# Patient Record
Sex: FEMALE | Race: WHITE | NOT HISPANIC OR LATINO | Employment: OTHER | URBAN - METROPOLITAN AREA
[De-identification: names, ages, dates, MRNs, and addresses within clinical notes are randomized per-mention and may not be internally consistent; named-entity substitution may affect disease eponyms.]

---

## 2017-01-03 ENCOUNTER — HOSPITAL ENCOUNTER (OUTPATIENT)
Dept: CT IMAGING | Facility: HOSPITAL | Age: 72
Discharge: HOME | End: 2017-01-03
Attending: INTERNAL MEDICINE | Admitting: INTERNAL MEDICINE

## 2017-01-03 DIAGNOSIS — K86.89 PANCREATIC MASS: ICD-10-CM

## 2017-01-03 DIAGNOSIS — R11.0 NAUSEA: ICD-10-CM

## 2017-01-03 DIAGNOSIS — R10.9 ABDOMINAL CRAMPS: ICD-10-CM

## 2017-01-03 LAB — CREAT BLDA-MCNC: 0.9 MG/DL (ref 0.6–1.3)

## 2017-01-03 PROCEDURE — 74160 CT ABDOMEN W/CONTRAST: CPT

## 2017-01-03 PROCEDURE — 0 IOPAMIDOL 61 % SOLUTION: Performed by: INTERNAL MEDICINE

## 2017-01-03 PROCEDURE — 82565 ASSAY OF CREATININE: CPT

## 2017-01-03 RX ADMIN — IOPAMIDOL 100 ML: 612 INJECTION, SOLUTION INTRAVENOUS at 09:00

## 2017-01-04 ENCOUNTER — HOSPITAL ENCOUNTER (OUTPATIENT)
Dept: NUCLEAR MEDICINE | Facility: HOSPITAL | Age: 72
Discharge: HOME | End: 2017-01-04
Attending: INTERNAL MEDICINE

## 2017-01-04 DIAGNOSIS — R11.0 NAUSEA: ICD-10-CM

## 2017-01-04 DIAGNOSIS — R10.9 ABDOMINAL CRAMPS: ICD-10-CM

## 2017-01-04 PROCEDURE — 78227 HEPATOBIL SYST IMAGE W/DRUG: CPT

## 2017-01-04 PROCEDURE — 0 TECHNETIUM TC 99M MEBROFENIN KIT: Performed by: INTERNAL MEDICINE

## 2017-01-04 PROCEDURE — A9537 TC99M MEBROFENIN: HCPCS | Performed by: INTERNAL MEDICINE

## 2017-01-04 PROCEDURE — 25010000002 SINCALIDE PER 5 MCG: Performed by: INTERNAL MEDICINE

## 2017-01-04 RX ORDER — KIT FOR THE PREPARATION OF TECHNETIUM TC 99M MEBROFENIN 45 MG/10ML
1 INJECTION, POWDER, LYOPHILIZED, FOR SOLUTION INTRAVENOUS
Status: COMPLETED | OUTPATIENT
Start: 2017-01-04 | End: 2017-01-04

## 2017-01-04 RX ADMIN — SINCALIDE 1.1 MCG: 5 INJECTION, POWDER, LYOPHILIZED, FOR SOLUTION INTRAVENOUS at 08:45

## 2017-01-04 RX ADMIN — MEBROFENIN 1 DOSE: 45 INJECTION, POWDER, LYOPHILIZED, FOR SOLUTION INTRAVENOUS at 07:40

## 2017-01-09 ENCOUNTER — HOSPITAL ENCOUNTER (OUTPATIENT)
Dept: GENERAL RADIOLOGY | Facility: HOSPITAL | Age: 72
Discharge: HOME | End: 2017-01-09
Attending: INTERNAL MEDICINE | Admitting: INTERNAL MEDICINE

## 2017-01-09 DIAGNOSIS — R10.9 ABDOMINAL CRAMPS: ICD-10-CM

## 2017-01-09 DIAGNOSIS — R11.0 NAUSEA: ICD-10-CM

## 2017-01-09 PROCEDURE — 74250 X-RAY XM SM INT 1CNTRST STD: CPT

## 2017-01-11 DIAGNOSIS — K86.89 PANCREATIC MASS: Primary | ICD-10-CM

## 2017-01-12 ENCOUNTER — TELEPHONE (OUTPATIENT)
Dept: GASTROENTEROLOGY | Facility: CLINIC | Age: 72
End: 2017-01-12

## 2017-01-12 NOTE — TELEPHONE ENCOUNTER
Called pt is asking whether she should keep her appt on Feb 1st.  Pt states she is still having issue with nausea.  Advised pt to keep her appt.  Pt verb understanding and is asking if she can have a refill on her compazine to hold her to her appt on Feb 1st.  Advised would send message to Dr Brumfield.  Pt verb understanding.

## 2017-01-12 NOTE — TELEPHONE ENCOUNTER
----- Message from Geneva Moreno sent at 1/12/2017  8:25 AM EST -----  Regarding: PT CALLED  Contact: [phone]   PT CALLED STATED SPOKE WITH  & WANTED TO GET A BETTER CLARIFICATION OF WHATS GOING ON

## 2017-01-13 ENCOUNTER — TELEPHONE (OUTPATIENT)
Dept: GASTROENTEROLOGY | Facility: CLINIC | Age: 72
End: 2017-01-13

## 2017-01-13 RX ORDER — PROCHLORPERAZINE MALEATE 5 MG/1
5 TABLET ORAL EVERY 8 HOURS PRN
Qty: 60 TABLET | Refills: 1 | Status: SHIPPED | OUTPATIENT
Start: 2017-01-13 | End: 2017-04-27 | Stop reason: SDUPTHER

## 2017-01-13 NOTE — TELEPHONE ENCOUNTER
Called pt and advised per Dr Brumfield that the sbft and the hida scan were both normal.  The ct scan of abd showed a stable , unchanged 4 mm cyst in the pancreas.  He plans to follow this with periodic ct of the abd in 6 months.  The radiologist will compare 1 ct to the other.  He wants her  To f/u one wk after ct done approx July 2017.  If she has questions , she can f/u or call.  Pt verb understanding and states she already has an appt on 02/01 and will keep it.

## 2017-01-13 NOTE — TELEPHONE ENCOUNTER
Yes, please call in a prescription for Prochlorperazine (Compazine) 10 mg; one po q 8 hrs prn nausea. #60 with 1 refill. [facility]     Called pt and advised per Dr Brumfield that we have escribed her compazine as described above.  Pt verb understanding.

## 2017-01-17 ENCOUNTER — TELEPHONE (OUTPATIENT)
Dept: GASTROENTEROLOGY | Facility: CLINIC | Age: 72
End: 2017-01-17

## 2017-01-17 DIAGNOSIS — K58.1 IRRITABLE BOWEL SYNDROME WITH CONSTIPATION: Primary | ICD-10-CM

## 2017-01-17 NOTE — TELEPHONE ENCOUNTER
----- Message from Geneva Moreno sent at 1/17/2017 12:14 PM EST -----  Regarding: PT CALLED   Contact: [phone]   PT CALLED COMPLAINING ABOUT STOMACH PAIN. WOKE UP LAST NIGHT WITH A LOT OF CRAMPING & DIDN'T STOP TILL THIS MORNING, PT ASKING FOR SOME MEDICATION.

## 2017-01-17 NOTE — TELEPHONE ENCOUNTER
Call to pt.  She reports that wakened @ 0340 this am with lower, midline abd cramping.  Ranks at 7 on pain scale.  (States often has abd cramping, but usually only about a 3. )  States lasted until 0700.  States had normal, large amount BM this am.  Reports had nausea which was controlled by Compazine. Pt asking if anything she can take as needed for cramps. Advise pt will send this request to Dr Brumfield.  Pt verb understanding.

## 2017-01-18 RX ORDER — GLYCOPYRROLATE 2 MG/1
TABLET ORAL
Qty: 60 TABLET | Refills: 11 | Status: SHIPPED | OUTPATIENT
Start: 2017-01-18 | End: 2017-07-13

## 2017-02-01 ENCOUNTER — TELEPHONE (OUTPATIENT)
Dept: GASTROENTEROLOGY | Facility: CLINIC | Age: 72
End: 2017-02-01

## 2017-02-01 NOTE — TELEPHONE ENCOUNTER
----- Message from Geneva Moreno sent at 2/1/2017  8:11 AM EST -----  Regarding: PT CALLED  Contact: [phone]   PT CALLED FOR APPT. PT STATED IS SICK BUT HAS APPT TODAY BUT WANTS TO TALK TO A NURSE

## 2017-02-01 NOTE — TELEPHONE ENCOUNTER
"Call from pt.  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  States \"from a GI standpoint I'm doing ok - just don't want to come into the office today and make other people sick.\"  Appt rescheduled for 3/1/17 @ 3874 with Dr Brumfield.  "

## 2017-03-01 ENCOUNTER — OFFICE VISIT (OUTPATIENT)
Dept: GASTROENTEROLOGY | Facility: CLINIC | Age: 72
End: 2017-03-01

## 2017-03-01 VITALS
WEIGHT: 120 LBS | HEIGHT: 65 IN | SYSTOLIC BLOOD PRESSURE: 112 MMHG | BODY MASS INDEX: 19.99 KG/M2 | DIASTOLIC BLOOD PRESSURE: 74 MMHG

## 2017-03-01 DIAGNOSIS — K86.89 PANCREATIC MASS: Primary | ICD-10-CM

## 2017-03-01 DIAGNOSIS — K63.5 COLON POLYPS: ICD-10-CM

## 2017-03-01 DIAGNOSIS — R19.7 DIARRHEA, UNSPECIFIED TYPE: ICD-10-CM

## 2017-03-01 DIAGNOSIS — K21.9 GASTROESOPHAGEAL REFLUX DISEASE, ESOPHAGITIS PRESENCE NOT SPECIFIED: ICD-10-CM

## 2017-03-01 PROCEDURE — 99213 OFFICE O/P EST LOW 20 MIN: CPT | Performed by: INTERNAL MEDICINE

## 2017-03-01 NOTE — PROGRESS NOTES
Chief Complaint   Patient presents with   • Follow-up     from scopes        History of Present Illness: We reviewed the results of the SBFT, Kinevac Hida scan and CT of the abd done in 1/17.  She has frequent BM's but no abdominal pain. She has tried Robinul Forte prn and it helps.     Past Medical History   Diagnosis Date   • Bipolar affect, depressed    • Cervical myelopathy    • Depression    • Gastritis    • GERD (gastroesophageal reflux disease)    • Migraine headache        Past Surgical History   Procedure Laterality Date   • Tonsillectomy     • Colonoscopy  05/2013   • Endoscopy  11/2013   • Endoscopy N/A 10/31/2016     erythematous mucosa in stomach   • Colonoscopy N/A 10/31/2016     polyps, tics, IHtubular adenoma w/low grade dysplasia x 2, hyperplastic polyp x3         Current Outpatient Prescriptions:   •  DEXILANT 60 MG capsule, , Disp: , Rfl:   •  glycopyrrolate (ROBINUL-FORTE) 2 MG tablet, Take one po BID prn abdominal cramping., Disp: 60 tablet, Rfl: 11  •  lamoTRIgine (LaMICtal) 100 MG tablet, Take 1 tablet by mouth 2 (two) times a day., Disp: , Rfl:   •  LORazepam (ATIVAN) 0.5 MG tablet, Take 0.5 mg by mouth every 8 (eight) hours as needed for anxiety., Disp: , Rfl:   •  prochlorperazine (COMPAZINE) 5 MG tablet, Take 1 tablet by mouth Every 8 (Eight) Hours As Needed for nausea or vomiting., Disp: 60 tablet, Rfl: 1    No Known Allergies    Family History   Problem Relation Age of Onset   • Stroke Mother    • Cancer Father    • Heart valve disorder Other    • Hypertension Other    • Migraines Other    • Cancer Maternal Grandmother    • Cancer Cousin        Social History     Social History   • Marital status: Single     Spouse name: N/A   • Number of children: N/A   • Years of education: N/A     Occupational History   • Not on file.     Social History Main Topics   • Smoking status: Former Smoker     Packs/day: 0.25     Types: Cigarettes     Quit date: 03/2016   • Smokeless tobacco: Not on file       Comment: We discussed nicotine patch, gum, hypnosis.   • Alcohol use Yes      Comment: social   • Drug use: No   • Sexual activity: Not on file     Other Topics Concern   • Not on file     Social History Narrative       Review of Systems   All other systems reviewed and are negative.      Vitals:    03/01/17 1537   BP: 112/74       Physical Exam   Constitutional: She is oriented to person, place, and time. She appears well-developed and well-nourished. No distress.   HENT:   Head: Normocephalic and atraumatic. Hair is normal.   Right Ear: Hearing, tympanic membrane, external ear and ear canal normal. No drainage. No decreased hearing is noted.   Left Ear: Hearing, tympanic membrane, external ear and ear canal normal. No decreased hearing is noted.   Nose: No nasal deformity.   Mouth/Throat: Oropharynx is clear and moist.   Eyes: Conjunctivae, EOM and lids are normal. Pupils are equal, round, and reactive to light. Right eye exhibits no discharge. Left eye exhibits no discharge.   Neck: Normal range of motion. Neck supple. No JVD present. No tracheal deviation present. No thyromegaly present.   Cardiovascular: Normal rate, regular rhythm, normal heart sounds, intact distal pulses and normal pulses.  Exam reveals no gallop and no friction rub.    No murmur heard.  Pulmonary/Chest: Effort normal and breath sounds normal. No respiratory distress. She has no wheezes. She has no rales. She exhibits no tenderness.   Abdominal: Soft. Bowel sounds are normal. She exhibits no distension and no mass. There is no tenderness. There is no rebound and no guarding. No hernia.   Musculoskeletal: Normal range of motion. She exhibits no edema, tenderness or deformity.   Lymphadenopathy:     She has no cervical adenopathy.   Neurological: She is alert and oriented to person, place, and time. She has normal reflexes. She displays normal reflexes. No cranial nerve deficit. She exhibits normal muscle tone. Coordination normal.   Skin:  Skin is warm and dry. No rash noted. She is not diaphoretic. No erythema.   Psychiatric: She has a normal mood and affect. Her behavior is normal. Judgment and thought content normal.   Vitals reviewed.      Lona was seen today for follow-up.    Diagnoses and all orders for this visit:    Pancreatic mass  -     MRI Abdomen With & Without Contrast; Future    Gastroesophageal reflux disease, esophagitis presence not specified    Colon polyps    Diarrhea, unspecified type    Assessment:  1) Loose bowels.  2) h/o colon polyps  3) GERD  4) Pancreatic uncinate cystic structure.    Recommendations:  1) Lactose free diet  2) in 7/17 get a CT abd with pancreatic protocol.  3) f/u one week after the CT scan.    No Follow-up on file.

## 2017-03-02 ENCOUNTER — TELEPHONE (OUTPATIENT)
Dept: GASTROENTEROLOGY | Facility: CLINIC | Age: 72
End: 2017-03-02

## 2017-03-02 NOTE — TELEPHONE ENCOUNTER
Scheduling ( Hannah ) from [facility] called and states they have orders for ct of abd and pelvis with and without contrast and they have an order for mri of abd with and without contrast.  They are asking do we want both xrays done.  Advised we would send message to Dr Brumfield and once answer received we will call her back at [phone]

## 2017-03-02 NOTE — TELEPHONE ENCOUNTER
Let's do an MRI of the pancreas in mid 7/17 (instead of a CT abd) to evaluate her pancreatic cyst seen on previous CT abdomens. Have her f/u with me one week after the MRI is done to go over the results. Please cancel the CT abd. [email or web address]

## 2017-03-06 NOTE — TELEPHONE ENCOUNTER
Called Hannah at scheduling ( [phone] ) and advised per Dr Brumfield that he wants the pt to have a mri of the pancreas in mid July of 2017 to eval her pancreatic cyst seen on previous ct of abd.  Advised them to cancel ct of abd and pelvis.  Left number for them to call if any questions.    Also called pt and advised of the above.  Advised her once her mri is scheduled to make a one week f/u appt.  Pt verb understanding.

## 2017-03-27 ENCOUNTER — TELEPHONE (OUTPATIENT)
Dept: GASTROENTEROLOGY | Facility: CLINIC | Age: 72
End: 2017-03-27

## 2017-03-27 NOTE — TELEPHONE ENCOUNTER
Called pt and pt reports that she has been very constipated.  She has not had a bm since Thursday.  She states she does eat a high fiber diet.  Pt reports she has tried a few otc things to have a bm but none has worked ( pt can not remember the name of what she has tried).  Pt states she stopped taking the robinul some time ago because it made her constipated.  Pt denies abd discomfort, fever, and chills.  Pt is asking what can she do or take.  Advised the pt will send message to Dr Brumfield and in the meantime she becomes uncomfortable to seek medical attn.  Pt verb understanding.

## 2017-03-27 NOTE — TELEPHONE ENCOUNTER
----- Message from Tammie Lofton sent at 3/27/2017  8:09 AM EDT -----  Regarding: PT CALLED -  QUESTIONS  Contact: [phone]  MED INTERACTION.

## 2017-04-26 ENCOUNTER — TELEPHONE (OUTPATIENT)
Dept: GASTROENTEROLOGY | Facility: CLINIC | Age: 72
End: 2017-04-26

## 2017-04-26 NOTE — TELEPHONE ENCOUNTER
----- Message from Geneva Moreno sent at 4/26/2017 10:47 AM EDT -----  Regarding: REFILL MEDS  Contact: [phone]   PT CALLING TO GET A REFILL ON MEDICATION   prochlorperazine (COMPAZINE) 5 MG tablet .    PHARM#[phone]

## 2017-04-27 RX ORDER — PROCHLORPERAZINE MALEATE 5 MG/1
5 TABLET ORAL EVERY 6 HOURS PRN
Qty: 60 TABLET | Refills: 2 | Status: SHIPPED | OUTPATIENT
Start: 2017-04-27 | End: 2017-11-02 | Stop reason: SDUPTHER

## 2017-07-11 ENCOUNTER — HOSPITAL ENCOUNTER (OUTPATIENT)
Dept: MRI IMAGING | Facility: HOSPITAL | Age: 72
Discharge: HOME | End: 2017-07-11
Attending: INTERNAL MEDICINE | Admitting: INTERNAL MEDICINE

## 2017-07-11 DIAGNOSIS — K86.89 PANCREATIC MASS: ICD-10-CM

## 2017-07-11 PROCEDURE — A9577 INJ MULTIHANCE: HCPCS | Performed by: INTERNAL MEDICINE

## 2017-07-11 PROCEDURE — 0 GADOBENATE DIMEGLUMINE 529 MG/ML SOLUTION: Performed by: INTERNAL MEDICINE

## 2017-07-11 PROCEDURE — 82565 ASSAY OF CREATININE: CPT

## 2017-07-11 PROCEDURE — 74183 MRI ABD W/O CNTR FLWD CNTR: CPT

## 2017-07-11 RX ADMIN — GADOBENATE DIMEGLUMINE 15 ML: 529 INJECTION, SOLUTION INTRAVENOUS at 12:25

## 2017-07-12 LAB — CREAT BLDA-MCNC: 0.9 MG/DL (ref 0.6–1.3)

## 2017-07-13 ENCOUNTER — OFFICE VISIT (OUTPATIENT)
Dept: INTERNAL MEDICINE | Age: 72
End: 2017-07-13

## 2017-07-13 ENCOUNTER — TELEPHONE (OUTPATIENT)
Dept: INTERNAL MEDICINE | Age: 72
End: 2017-07-13

## 2017-07-13 VITALS
HEART RATE: 69 BPM | BODY MASS INDEX: 19.96 KG/M2 | TEMPERATURE: 98.2 F | DIASTOLIC BLOOD PRESSURE: 90 MMHG | HEIGHT: 65 IN | OXYGEN SATURATION: 98 % | SYSTOLIC BLOOD PRESSURE: 150 MMHG | WEIGHT: 119.8 LBS

## 2017-07-13 DIAGNOSIS — R53.83 OTHER FATIGUE: Primary | ICD-10-CM

## 2017-07-13 PROCEDURE — 99214 OFFICE O/P EST MOD 30 MIN: CPT | Performed by: INTERNAL MEDICINE

## 2017-07-13 RX ORDER — LAMOTRIGINE 100 MG/1
150 TABLET ORAL 3 TIMES DAILY
COMMUNITY
Start: 2017-07-13

## 2017-07-13 RX ORDER — LAMOTRIGINE 200 MG/1
1 TABLET ORAL DAILY
COMMUNITY
Start: 2017-02-08 | End: 2017-07-13

## 2017-07-13 NOTE — PROGRESS NOTES
"Lona Scottaney / 71 y.o. / female  07/13/2017  VITALS    /90  Pulse 69  Temp 98.2 °F (36.8 °C)  Ht 65\" (165.1 cm)  Wt 119 lb 12.8 oz (54.3 kg)  SpO2 98%  BMI 19.94 kg/m2  BP Readings from Last 3 Encounters:   07/13/17 150/90   03/01/17 112/74   10/31/16 148/75     Wt Readings from Last 3 Encounters:   07/13/17 119 lb 12.8 oz (54.3 kg)   03/01/17 120 lb (54.4 kg)   12/07/16 120 lb 3.2 oz (54.5 kg)      Body mass index is 19.94 kg/(m^2).    CC:  Main reason(s) for today's visit: Fatigue and Weight Loss      HPI:     Patient presents today with fatigue.  She notes adequate sleep, no recent change in her diet, but she has been tracking her steps with an apical watch, and over the past 6 weeks, her steps have been declining significantly.  She denied any chest pain or shortness of breath while walking.  There've been no palpitations noted she denied usage of any over-the-counter supplements.  Medication review, patient has been using Lamictal for the past 12-15 years, I have also reviewed the list of side effects, there is no mention of fatigue    Patient Care Team:  Duy Linn MD as PCP - General (Internal Medicine)  Thad Brumfield MD as PCP - Claims Attributed    ____________________________________________________________________    ASSESSMENT & PLAN:    Problem List Items Addressed This Visit        Unprioritized    Fatigue - Primary        No orders of the defined types were placed in this encounter.      Summary/Discussion:     · Number-one fatigue with rather precipitous decline in exercise tolerance.  We'll rule out metabolic causes with CBC, CMP protein electrophoresis sedimentation rate and thyroid studies as above.  We'll screen for malignancy with chest x-ray, I see no other lymphadenopathy or organ involvement to consider abdominal pelvic CT.  We'll also evaluate with stress test to evaluate for cardiovascular disease.      No Follow-up on file.    Future Appointments  Date Time Provider " Department Center   8/1/2017 1:20 PM CRYSTAL Graham [facility] None       ______________________________________________________    REVIEW OF SYSTEMS    Review of Systems   Constitutional: Negative for appetite change.   Cardiovascular:        No PND nor orthopnea noted.   Hematological: Negative for adenopathy.         PHYSICAL EXAMINATION    Physical Exam   Constitutional: She is oriented to person, place, and time. She appears well-developed and well-nourished. No distress.   Cardiovascular: Normal rate, regular rhythm, normal heart sounds and intact distal pulses.  Exam reveals no gallop and no friction rub.    No murmur heard.  Pulmonary/Chest: Effort normal and breath sounds normal. She has no wheezes. She has no rales.   Abdominal: Soft. Bowel sounds are normal. There is no hepatosplenomegaly.   Musculoskeletal: She exhibits no edema.   Lymphadenopathy:     She has no cervical adenopathy.     She has no axillary adenopathy.        Right: No inguinal and no epitrochlear adenopathy present.        Left: No inguinal and no epitrochlear adenopathy present.   Neurological: She is alert and oriented to person, place, and time.   Skin: Skin is warm and dry. No rash noted.   Psychiatric: She has a normal mood and affect. Her behavior is normal. Judgment and thought content normal.   Nursing note and vitals reviewed.        REVIEWED DATA:    Labs:   Lab Results   Component Value Date     09/30/2016    K 4.5 09/30/2016    AST 17 09/30/2016    ALT 16 09/30/2016    BUN 12 09/30/2016    CREATININE 0.90 07/11/2017    CREATININE 0.90 01/03/2017    CREATININE 0.80 11/22/2016    EGFRIFNONA 69 09/30/2016    EGFRIFAFRI 84 09/30/2016          Lab Results   Component Value Date    GLU 99 09/30/2016       No results found for: LDL, HDL, TRIG, CHOLHDLRATIO    Lab Results   Component Value Date    TSH 1.840 09/30/2016    FREET4 1.18 09/30/2016          Lab Results   Component Value Date    WBC 7.75 09/30/2016    WBC  0-5 09/30/2016    HGB 15.7 (H) 09/30/2016     09/30/2016        Imaging:        Medical Tests:        Summary of old records / correspondence / consultant report:        Request outside records:        No Known Allergies    Current Outpatient Prescriptions   Medication Sig Dispense Refill   • DEXILANT 60 MG capsule      • lamoTRIgine (LaMICtal) 100 MG tablet Take 1.5 tablets by mouth 2 (Two) Times a Day.     • prochlorperazine (COMPAZINE) 5 MG tablet Take 1 tablet by mouth Every 6 (Six) Hours As Needed for Nausea or Vomiting. 60 tablet 2     No current facility-administered medications for this visit.        PFSH:     The following portions of the patient's history were reviewed and updated as appropriate: Allergies / Current Medications / Past Medical History / Surgical History / Social History / Family History    Patient Active Problem List   Diagnosis   • Cervical myelopathy   • Cervicogenic headache   • Atypical migraine   • Chronic nausea   • Smoking 1/2 pack a day or less   • Muscle spasm   • Dyspepsia   • Tubular adenoma of colon   • Fatigue       Past Medical History:   Diagnosis Date   • Bipolar affect, depressed    • Cervical myelopathy    • Depression    • Gastritis    • GERD (gastroesophageal reflux disease)    • Migraine headache        Past Surgical History:   Procedure Laterality Date   • COLONOSCOPY  05/2013   • COLONOSCOPY N/A 10/31/2016    polyps, tics, IHtubular adenoma w/low grade dysplasia x 2, hyperplastic polyp x3   • ENDOSCOPY  11/2013   • ENDOSCOPY N/A 10/31/2016    erythematous mucosa in stomach   • TONSILLECTOMY         Social History     Social History   • Marital status: Single     Spouse name: N/A   • Number of children: 0   • Years of education: N/A     Occupational History   •       Social History Main Topics   • Smoking status: Former Smoker     Packs/day: 0.25     Years: 12.00     Types: Cigarettes     Quit date: 03/2016   • Smokeless tobacco: Never Used       Comment: We discussed nicotine patch, gum, hypnosis.   • Alcohol use Yes      Comment: social   • Drug use: No   • Sexual activity: Not Asked     Other Topics Concern   • None     Social History Narrative   • None       Family History   Problem Relation Age of Onset   • Stroke Mother    • Cancer Father    • Heart valve disorder Other    • Hypertension Other    • Migraines Other    • Cancer Maternal Grandmother    • Cancer Cousin          **Beverly Disclaimer:   Much of this encounter note is an electronic transcription/translation of spoken language to printed text. The electronic translation of spoken language may permit erroneous, or at times, nonsensical words or phrases to be inadvertently transcribed. Although I have reviewed the note for such errors, some may still exist.

## 2017-07-13 NOTE — TELEPHONE ENCOUNTER
Pt called stating she had seen Dr Linn this morning and one of her symptoms is fatigue. Pt said she forgot to tell Dr Linn one of her medications is Lexapro. Pt said one of the side effects is fatigue, so she is going to stop that medication.   Pt's # [phone]  Thanks SP

## 2017-07-13 NOTE — TELEPHONE ENCOUNTER
I would not stop the medication abruptly, recommend that she reduce the daily dosage by 50% for 2 weeks, then every other day for 2 weeks then stop.

## 2017-07-14 LAB
ALBUMIN SERPL ELPH-MCNC: 4 G/DL (ref 2.9–4.4)
ALBUMIN SERPL-MCNC: 5 G/DL (ref 3.5–5.2)
ALBUMIN/GLOB SERPL: 1.3 {RATIO} (ref 0.7–1.7)
ALBUMIN/GLOB SERPL: 2.5 G/DL
ALP SERPL-CCNC: 114 U/L (ref 39–117)
ALPHA1 GLOB SERPL ELPH-MCNC: 0.3 G/DL (ref 0–0.4)
ALPHA2 GLOB SERPL ELPH-MCNC: 0.7 G/DL (ref 0.4–1)
ALT SERPL-CCNC: 18 U/L (ref 1–33)
AST SERPL-CCNC: 17 U/L (ref 1–32)
B-GLOBULIN SERPL ELPH-MCNC: 1.2 G/DL (ref 0.7–1.3)
BASOPHILS # BLD AUTO: 0.03 10*3/MM3 (ref 0–0.2)
BASOPHILS NFR BLD AUTO: 0.4 % (ref 0–1.5)
BILIRUB SERPL-MCNC: 0.2 MG/DL (ref 0.1–1.2)
BUN SERPL-MCNC: 15 MG/DL (ref 8–23)
BUN/CREAT SERPL: 18.1 (ref 7–25)
CALCIUM SERPL-MCNC: 9.6 MG/DL (ref 8.6–10.5)
CHLORIDE SERPL-SCNC: 100 MMOL/L (ref 98–107)
CO2 SERPL-SCNC: 25.2 MMOL/L (ref 22–29)
CREAT SERPL-MCNC: 0.83 MG/DL (ref 0.57–1)
EOSINOPHIL # BLD AUTO: 0.1 10*3/MM3 (ref 0–0.7)
EOSINOPHIL NFR BLD AUTO: 1.2 % (ref 0.3–6.2)
ERYTHROCYTE [DISTWIDTH] IN BLOOD BY AUTOMATED COUNT: 13.4 % (ref 11.7–13)
ERYTHROCYTE [SEDIMENTATION RATE] IN BLOOD BY WESTERGREN METHOD: 2 MM/HR (ref 0–30)
GAMMA GLOB SERPL ELPH-MCNC: 0.8 G/DL (ref 0.4–1.8)
GLOBULIN SER CALC-MCNC: 2 GM/DL
GLOBULIN SER CALC-MCNC: 3 G/DL (ref 2.2–3.9)
GLUCOSE SERPL-MCNC: 100 MG/DL (ref 65–99)
HCT VFR BLD AUTO: 45.7 % (ref 35.6–45.5)
HGB BLD-MCNC: 14.7 G/DL (ref 11.9–15.5)
IMM GRANULOCYTES # BLD: 0 10*3/MM3 (ref 0–0.03)
IMM GRANULOCYTES NFR BLD: 0 % (ref 0–0.5)
LYMPHOCYTES # BLD AUTO: 2.24 10*3/MM3 (ref 0.9–4.8)
LYMPHOCYTES NFR BLD AUTO: 26.3 % (ref 19.6–45.3)
Lab: NORMAL
M PROTEIN SERPL ELPH-MCNC: NORMAL G/DL
MCH RBC QN AUTO: 32.8 PG (ref 26.9–32)
MCHC RBC AUTO-ENTMCNC: 32.2 G/DL (ref 32.4–36.3)
MCV RBC AUTO: 102 FL (ref 80.5–98.2)
MONOCYTES # BLD AUTO: 0.58 10*3/MM3 (ref 0.2–1.2)
MONOCYTES NFR BLD AUTO: 6.8 % (ref 5–12)
NEUTROPHILS # BLD AUTO: 5.58 10*3/MM3 (ref 1.9–8.1)
NEUTROPHILS NFR BLD AUTO: 65.3 % (ref 42.7–76)
PLATELET # BLD AUTO: 353 10*3/MM3 (ref 140–500)
POTASSIUM SERPL-SCNC: 4.3 MMOL/L (ref 3.5–5.2)
PROT PATTERN SERPL ELPH-IMP: NORMAL
PROT SERPL-MCNC: 7 G/DL (ref 6–8.5)
RBC # BLD AUTO: 4.48 10*6/MM3 (ref 3.9–5.2)
SODIUM SERPL-SCNC: 141 MMOL/L (ref 136–145)
T4 FREE SERPL-MCNC: 1.04 NG/DL (ref 0.93–1.7)
TSH SERPL DL<=0.005 MIU/L-ACNC: 1.97 MIU/ML (ref 0.27–4.2)
WBC # BLD AUTO: 8.53 10*3/MM3 (ref 4.5–10.7)

## 2017-07-19 ENCOUNTER — TELEPHONE (OUTPATIENT)
Dept: GASTROENTEROLOGY | Facility: CLINIC | Age: 72
End: 2017-07-19

## 2017-07-19 DIAGNOSIS — K86.89 PANCREATIC MASS: Primary | ICD-10-CM

## 2017-07-19 NOTE — PROGRESS NOTES
I called her with the results of the MRCP.  I will schedule her for a repeat MRCP in one year and she can call for the results.

## 2017-07-19 NOTE — TELEPHONE ENCOUNTER
----- Message from Geneva Moreno sent at 7/19/2017  1:07 PM EDT -----  Regarding: PT CALLED  Contact: [phone]   PT IS CALLING STATING SHE WOULD LIKE TO KNOW HER MRI RESULTS

## 2017-07-19 NOTE — TELEPHONE ENCOUNTER
I called her with the results of her recent MRCP.  The recommendation is that we repeat an MRCP in one year because the pancreatic uncinate lesion that was previously found on CAT scan of the abdomen, has not changed.    SA/MT - I ordered a repeat MRCP to be done in one year.  Please ask the patient to call in 1 year after the MRCP is done to go over the results.  Also have her follow-up with me in the office sometime after the repeat MRCP is done in one year.

## 2017-07-20 NOTE — TELEPHONE ENCOUNTER
Called pt and advised per Dr Brumfield that he wants her to call in one yr after the mrcp is done to go over the results.  Also he wants her to f/u with him in the office after the repeat mrcp is done in one yr. Pt verb understanding.     F/u placed in recall for 07/20/2018.

## 2017-08-01 ENCOUNTER — OFFICE VISIT (OUTPATIENT)
Dept: INTERNAL MEDICINE | Age: 72
End: 2017-08-01

## 2017-08-01 VITALS
HEIGHT: 65 IN | SYSTOLIC BLOOD PRESSURE: 132 MMHG | OXYGEN SATURATION: 98 % | TEMPERATURE: 97.7 F | HEART RATE: 79 BPM | WEIGHT: 120.2 LBS | DIASTOLIC BLOOD PRESSURE: 84 MMHG | BODY MASS INDEX: 20.03 KG/M2

## 2017-08-01 DIAGNOSIS — F31.9 BIPOLAR AFFECTIVE DISORDER, REMISSION STATUS UNSPECIFIED (HCC): Primary | ICD-10-CM

## 2017-08-01 PROCEDURE — G0439 PPPS, SUBSEQ VISIT: HCPCS | Performed by: NURSE PRACTITIONER

## 2017-08-01 RX ORDER — ESCITALOPRAM OXALATE 5 MG/1
2.5 TABLET ORAL EVERY OTHER DAY
COMMUNITY
Start: 2017-08-01 | End: 2018-02-15

## 2017-08-01 NOTE — PATIENT INSTRUCTIONS
Medicare Wellness  Personal Prevention Plan of Service     Date of Office Visit:  2017  Encounter Provider:  CRYSTAL Graham  Place of Service:  [facility]  Patient Name: Lona Thomson  :  1945    As part of the Medicare Wellness portion of your visit today, we are providing you with this personalized preventive plan of services (PPPS). This plan is based upon recommendations of the [place] Preventive Services Task Force (USPSTF) and the Advisory Committee on Immunization Practices (ACIP).    This lists the preventive care services that should be considered, and provides dates of when you are due. Items listed as completed are up-to-date and do not require any further intervention.      Age-Appropriate Screening Schedule:  (Refer to the list below for future screening recommendations based on patient's age, sex and/or medical conditions. Orders for these recommended tests are listed in the plan section. The patient has been provided with a written plan)    Health Maintenance Topics  Health Maintenance   Topic Date Due   • TDAP/TD VACCINES (1 - Tdap) 1966   • PNEUMOCOCCAL VACCINES (65+ LOW/MEDIUM RISK) (1 of 2 - PCV13) 2012   • INFLUENZA VACCINE  2017   • MAMMOGRAM  2018   • COLONOSCOPY  2027   • ZOSTER VACCINE  Completed       Health Maintenance Topics Due or Over-Due  Health Maintenance Due   Topic Date Due   • TDAP/TD VACCINES (1 - Tdap) 1966   • PNEUMOCOCCAL VACCINES (65+ LOW/MEDIUM RISK) (1 of 2 - PCV13) 2012   • HEPATITIS C SCREENING  2016   • INFLUENZA VACCINE  2017           ADDITIONAL RESOURCES:    For excellent information on senior health and wellness refer to the National [employer] web-site at:    WWW .NIHSENIORHEALTH.GOV

## 2017-08-01 NOTE — PROGRESS NOTES
QUICK REFERENCE INFORMATION:  The ABCs of the Annual Wellness Visit    Subsequent Medicare Wellness Visit    HEALTH RISK ASSESSMENT    1945    Recent Hospitalizations:  No hospitalization(s) within the last year..        Current Medical Providers:  Patient Care Team:  Duy Linn MD as PCP - General (Internal Medicine)  Thad Brumfield MD as PCP - Claims Attributed  Thad Brumfield MD as Consulting Physician (Gastroenterology)  Adeola Kaufman MD as Consulting Physician (Gynecology)  Ashlee Hampton MD (Psychiatry)        Smoking Status:  History   Smoking Status   • Former Smoker   • Packs/day: 0.25   • Years: 12.00   • Types: Cigarettes   • Quit date: 03/2016   Smokeless Tobacco   • Never Used     Comment: We discussed nicotine patch, gum, hypnosis.       Alcohol Consumption:  History   Alcohol Use   • Yes     Comment: social       Depression Screen:   PHQ-9 Depression Screening 8/1/2017   Little interest or pleasure in doing things 0   Feeling down, depressed, or hopeless 0   PHQ-9 Total Score 0       Health Habits and Functional and Cognitive Screening:  Functional & Cognitive Status 8/1/2017   Do you have difficulty preparing food and eating? No   Do you have difficulty bathing yourself? No   Do you have difficulty getting dressed? No   Do you have difficulty using the toilet? No   Do you have difficulty moving around from place to place? No   In the past year have you fallen or experienced a near fall? No   Do you need help using the phone?  No   Are you deaf or do you have serious difficulty hearing?  No   Do you need help with transportation? No   Do you need help shopping? No   Do you need help preparing meals?  No   Do you need help with housework?  No   Do you need help with laundry? No   Do you need help taking your medications? No   Do you need help managing money? No   Do you have difficulty concentrating, remembering or making decisions? No       Health Habits  Current Diet: Well Balanced  Diet  Exercise (times per week): 7 times per week  Current Exercise Activities Include: Walking      Does the patient have evidence of cognitive impairment? No    Aspirin use counseling: Does not need ASA (and currently is not on it)      Recent Lab Results:  CMP:  Lab Results   Component Value Date     (H) 07/13/2017    BUN 15 07/13/2017    CREATININE 0.83 07/13/2017    EGFRIFNONA 68 07/13/2017    EGFRIFAFRI 83 07/13/2017    BCR 18.1 07/13/2017     07/13/2017    K 4.3 07/13/2017    CO2 25.2 07/13/2017    CALCIUM 9.6 07/13/2017    PROTENTOTREF 7.0 07/13/2017    ALBUMIN 4.0 07/13/2017    ALBUMIN 5.00 07/13/2017    LABGLOBREF 3.0 07/13/2017    LABGLOBREF 2.0 07/13/2017    LABIL2 1.3 07/13/2017    LABIL2 2.5 07/13/2017    BILITOT 0.2 07/13/2017    ALKPHOS 114 07/13/2017    AST 17 07/13/2017    ALT 18 07/13/2017     Lipid Panel:     HbA1c:       Visual Acuity:  No exam data present    Age-appropriate Screening Schedule:  Refer to the list below for future screening recommendations based on patient's age, sex and/or medical conditions. Orders for these recommended tests are listed in the plan section. The patient has been provided with a written plan.    Health Maintenance   Topic Date Due   • TDAP/TD VACCINES (1 - Tdap) 11/03/1966   • PNEUMOCOCCAL VACCINES (65+ LOW/MEDIUM RISK) (1 of 2 - PCV13) 11/03/2012   • INFLUENZA VACCINE  08/01/2017   • MAMMOGRAM  03/16/2018   • COLONOSCOPY  03/01/2027   • ZOSTER VACCINE  Completed        Subjective   History of Present Illness    Lona Thomson is a 71 y.o. female who presents for an Subsequent Wellness Visit.    The following portions of the patient's history were reviewed and updated as appropriate: allergies, current medications, past family history, past medical history, past social history, past surgical history and problem list.    Outpatient Medications Prior to Visit   Medication Sig Dispense Refill   • DEXILANT 60 MG capsule      • lamoTRIgine (LaMICtal)  "100 MG tablet Take 1.5 tablets by mouth 2 (Two) Times a Day.     • prochlorperazine (COMPAZINE) 5 MG tablet Take 1 tablet by mouth Every 6 (Six) Hours As Needed for Nausea or Vomiting. 60 tablet 2     No facility-administered medications prior to visit.        Patient Active Problem List   Diagnosis   • Cervical myelopathy   • Cervicogenic headache   • Atypical migraine   • Chronic nausea   • Smoking 1/2 pack a day or less   • Muscle spasm   • Dyspepsia   • Tubular adenoma of colon   • Fatigue   • Bipolar disorder       Advance Care Planning:  has NO advance directive - not interested in additional information    Identification of Risk Factors:  Risk factors include: alcohol use and depression.    Review of Systems    Compared to one year ago, the patient feels her physical health is the same.  Compared to one year ago, the patient feels her mental health is the same.    Objective     Physical Exam    Vitals:    08/01/17 1315   BP: 132/84   Pulse: 79   Temp: 97.7 °F (36.5 °C)   SpO2: 98%   Weight: 120 lb 3.2 oz (54.5 kg)   Height: 65\" (165.1 cm)       Body mass index is 20 kg/(m^2).  Discussed the patient's BMI with her. The BMI is in the acceptable range.    Assessment/Plan   Patient Self-Management and Personalized Health Advice  The patient has been provided with information about: diet and exercise and preventive services including:   · Influenza vaccine, Nutrition counseling provided, Pneumococcal vaccine , TdaP vaccine, Zostavax vaccine (Herpes Zoster).    Visit Diagnoses:    ICD-10-CM ICD-9-CM   1. Bipolar affective disorder, remission status unspecified F31.9 296.80        No orders of the defined types were placed in this encounter.      Outpatient Encounter Prescriptions as of 8/1/2017   Medication Sig Dispense Refill   • DEXILANT 60 MG capsule      • lamoTRIgine (LaMICtal) 100 MG tablet Take 1.5 tablets by mouth 2 (Two) Times a Day.     • prochlorperazine (COMPAZINE) 5 MG tablet Take 1 tablet by mouth " Every 6 (Six) Hours As Needed for Nausea or Vomiting. 60 tablet 2   • escitalopram (LEXAPRO) 5 MG tablet Take 0.5 tablets by mouth Every Other Day.       No facility-administered encounter medications on file as of 8/1/2017.        Reviewed use of high risk medication in the elderly: yes  Reviewed for potential of harmful drug interactions in the elderly: yes    Patient also reports at today's visit that the fatigue that she recently saw Dr. Linn for has significantly improved since tapering down from her Lexapro.    Follow Up:  No Follow-up on file.   Follow-up with Dr. Linn as scheduled on 8/24/17 for follow-up of fatigue.      An After Visit Summary and PPPS with all of these plans were given to the patient.

## 2017-08-23 ENCOUNTER — TELEPHONE (OUTPATIENT)
Dept: INTERNAL MEDICINE | Age: 72
End: 2017-08-23

## 2017-08-23 NOTE — TELEPHONE ENCOUNTER
I cannot find a good reason either for her to return on August 25, 2017.  I would suggest that since she has just been seen on August 1, 2017 for her AWV that she might cancel the August 25, 2017 visit but she will need to find out from Dr. Linn when he does want her to return.

## 2017-08-23 NOTE — TELEPHONE ENCOUNTER
Pt is questioning the need for appt on 8/25/17. She saw CRYSTAL Vigil on 8/1/17 for AWV. No c/o noted.  She has weaned off of Lexapro successfully s s/s of dyskinesis. No c/o weakness, confusion, or lethargy stated.  Please advise. MM

## 2017-08-24 NOTE — TELEPHONE ENCOUNTER
Pt states she is feeling very good now since your last visit / conversation and not having any sx since she has been off the lexapro. Pt states that she did not know that she had to go do the xray and there was a mis communication but states that she is feeling fine now. Please advise

## 2017-08-24 NOTE — TELEPHONE ENCOUNTER
If she has no symptoms, and would like to cancel the chest x-ray and stress test, that is fine with me.  If symptoms recur, I would suggest that we proceed with testing as previously ordered.

## 2017-08-24 NOTE — TELEPHONE ENCOUNTER
She was here on July 13 with complaint of fatigue.  What happened to the chest x-ray that I ordered along with a stress test?  Did she have these done, if so were other results?  If she is not planning on having these done because she has absolutely no symptoms at  this time, please let us know.

## 2017-09-26 ENCOUNTER — TELEPHONE (OUTPATIENT)
Dept: INTERNAL MEDICINE | Age: 72
End: 2017-09-26

## 2017-09-26 NOTE — TELEPHONE ENCOUNTER
Pt has not been seen/tx for knee. Pt was then advised to be seen to get baseline info.  Pt was offered appt on 10/18/17, which was to far out. Pt was then instructed to call in am to get same day acute appt. Pt verbalized understanding. MM

## 2017-09-26 NOTE — TELEPHONE ENCOUNTER
Pt is requesting a referral for a Orthopedic Specialist due to RT knee has collapsed 3 times.    Pt can be reached #[phone].

## 2017-11-02 ENCOUNTER — OFFICE VISIT (OUTPATIENT)
Dept: INTERNAL MEDICINE | Age: 72
End: 2017-11-02

## 2017-11-02 VITALS
WEIGHT: 119.8 LBS | OXYGEN SATURATION: 98 % | SYSTOLIC BLOOD PRESSURE: 132 MMHG | HEART RATE: 77 BPM | TEMPERATURE: 97.9 F | DIASTOLIC BLOOD PRESSURE: 82 MMHG | HEIGHT: 65 IN | BODY MASS INDEX: 19.96 KG/M2

## 2017-11-02 DIAGNOSIS — B34.9 ACUTE VIRAL SYNDROME: ICD-10-CM

## 2017-11-02 DIAGNOSIS — R05.9 COUGH: Primary | ICD-10-CM

## 2017-11-02 LAB
BASOPHILS # BLD AUTO: 0.03 10*3/MM3 (ref 0–0.2)
BASOPHILS NFR BLD AUTO: 0.3 % (ref 0–1.5)
EOSINOPHIL # BLD AUTO: 0.05 10*3/MM3 (ref 0–0.7)
EOSINOPHIL NFR BLD AUTO: 0.6 % (ref 0.3–6.2)
ERYTHROCYTE [DISTWIDTH] IN BLOOD BY AUTOMATED COUNT: 13.1 % (ref 11.7–13)
HCT VFR BLD AUTO: 45.9 % (ref 35.6–45.5)
HGB BLD-MCNC: 15 G/DL (ref 11.9–15.5)
IMM GRANULOCYTES # BLD: 0.02 10*3/MM3 (ref 0–0.03)
IMM GRANULOCYTES NFR BLD: 0.2 % (ref 0–0.5)
LYMPHOCYTES # BLD AUTO: 2.38 10*3/MM3 (ref 0.9–4.8)
LYMPHOCYTES NFR BLD AUTO: 26.4 % (ref 19.6–45.3)
MCH RBC QN AUTO: 32.8 PG (ref 26.9–32)
MCHC RBC AUTO-ENTMCNC: 32.7 G/DL (ref 32.4–36.3)
MCV RBC AUTO: 100.4 FL (ref 80.5–98.2)
MONOCYTES # BLD AUTO: 0.67 10*3/MM3 (ref 0.2–1.2)
MONOCYTES NFR BLD AUTO: 7.4 % (ref 5–12)
NEUTROPHILS # BLD AUTO: 5.88 10*3/MM3 (ref 1.9–8.1)
NEUTROPHILS NFR BLD AUTO: 65.1 % (ref 42.7–76)
PLATELET # BLD AUTO: 364 10*3/MM3 (ref 140–500)
RBC # BLD AUTO: 4.57 10*6/MM3 (ref 3.9–5.2)
WBC # BLD AUTO: 9.03 10*3/MM3 (ref 4.5–10.7)

## 2017-11-02 PROCEDURE — 99213 OFFICE O/P EST LOW 20 MIN: CPT | Performed by: NURSE PRACTITIONER

## 2017-11-02 RX ORDER — BROMPHENIRAMINE MALEATE, PSEUDOEPHEDRINE HYDROCHLORIDE, AND DEXTROMETHORPHAN HYDROBROMIDE 2; 30; 10 MG/5ML; MG/5ML; MG/5ML
SYRUP ORAL
Refills: 0 | COMMUNITY
Start: 2017-10-02 | End: 2018-02-15

## 2017-11-02 RX ORDER — ASPIRIN 325 MG
325 TABLET ORAL
COMMUNITY
End: 2018-02-15

## 2017-11-02 RX ORDER — FEXOFENADINE HCL AND PSEUDOEPHEDRINE HCI 60; 120 MG/1; MG/1
1 TABLET, EXTENDED RELEASE ORAL
COMMUNITY
End: 2018-02-15

## 2017-11-02 RX ORDER — PROCHLORPERAZINE MALEATE 5 MG/1
TABLET ORAL
Qty: 60 TABLET | Refills: 2 | Status: SHIPPED | OUTPATIENT
Start: 2017-11-02 | End: 2018-05-07 | Stop reason: SDUPTHER

## 2017-11-02 RX ORDER — BENZONATATE 100 MG/1
100 CAPSULE ORAL 3 TIMES DAILY PRN
Qty: 30 CAPSULE | Refills: 0 | Status: SHIPPED | OUTPATIENT
Start: 2017-11-02 | End: 2018-02-15

## 2017-11-02 NOTE — PATIENT INSTRUCTIONS
Viral Respiratory Infection  A respiratory infection is an illness that affects part of the respiratory system, such as the lungs, nose, or throat. Most respiratory infections are caused by either viruses or bacteria. A respiratory infection that is caused by a virus is called a viral respiratory infection.  Common types of viral respiratory infections include:  · A cold.  · The flu (influenza).  · A respiratory syncytial virus (RSV) infection.  HOW DO I KNOW IF I HAVE A VIRAL RESPIRATORY INFECTION?  Most viral respiratory infections cause:  · A stuffy or runny nose.  · Yellow or green nasal discharge.  · A cough.  · Sneezing.  · Fatigue.  · Achy muscles.  · A sore throat.  · Sweating or chills.  · A fever.  · A headache.  HOW ARE VIRAL RESPIRATORY INFECTIONS TREATED?  If influenza is diagnosed early, it may be treated with an antiviral medicine that shortens the length of time a person has symptoms. Symptoms of viral respiratory infections may be treated with over-the-counter and prescription medicines, such as:  · Expectorants. These make it easier to cough up mucus.  · Decongestant nasal sprays.  Health care providers do not prescribe antibiotic medicines for viral infections. This is because antibiotics are designed to kill bacteria. They have no effect on viruses.  HOW DO I KNOW IF I SHOULD STAY HOME FROM WORK OR SCHOOL?  To avoid exposing others to your respiratory infection, stay home if you have:  · A fever.  · A persistent cough.  · A sore throat.  · A runny nose.  · Sneezing.  · Muscles aches.  · Headaches.  · Fatigue.  · Weakness.  · Chills.  · Sweating.  · Nausea.  HOME CARE INSTRUCTIONS  · Rest as much as possible.  · Take over-the-counter and prescription medicines only as told by your health care provider.  · Drink enough fluid to keep your urine clear or pale yellow. This helps prevent dehydration and helps loosen up mucus.  · Gargle with a salt-water mixture 3-4 times per day or as needed. To make a  salt-water mixture, completely dissolve ½-1 tsp of salt in 1 cup of warm water.  · Use nose drops made from salt water to ease congestion and soften raw skin around your nose.  · Do not drink alcohol.  · Do not use tobacco products, including cigarettes, chewing tobacco, and e-cigarettes. If you need help quitting, ask your health care provider.  SEEK MEDICAL CARE IF:  · Your symptoms last for 10 days or longer.  · Your symptoms get worse over time.  · You have a fever.  · You have severe sinus pain in your face or forehead.  · The glands in your jaw or neck become very swollen.  SEEK IMMEDIATE MEDICAL CARE IF:  · You feel pain or pressure in your chest.  · You have shortness of breath.  · You faint or feel like you will faint.  · You have severe and persistent vomiting.  · You feel confused or disoriented.     This information is not intended to replace advice given to you by your health care provider. Make sure you discuss any questions you have with your health care provider.     Document Released: 09/27/2006 Document Revised: 04/10/2017 Document Reviewed: 05/25/2016  [employer] Patient Education ©2017 [employer]

## 2017-11-02 NOTE — PROGRESS NOTES
Subjective   Lona Thomson is a 71 y.o. female.     Cough   This is a new problem. The current episode started 1 to 4 weeks ago. The cough is non-productive. Associated symptoms include a fever. Pertinent negatives include no chest pain, ear pain, myalgias, nasal congestion, rhinorrhea or shortness of breath. Associated symptoms comments: Chest tight x 3 days. She has tried nothing for the symptoms. There is no history of asthma, COPD or pneumonia.   Nausea   This is a new problem. The current episode started in the past 7 days. Associated symptoms include coughing, fatigue, a fever and nausea. Pertinent negatives include no arthralgias, chest pain, congestion, myalgias or vomiting.   Fever    Associated symptoms include coughing and nausea. Pertinent negatives include no chest pain, congestion, ear pain or vomiting.    She has not had flu vaccination. No known sick contacts.     The following portions of the patient's history were reviewed and updated as appropriate: allergies, current medications, past family history, past medical history, past social history, past surgical history and problem list.    Review of Systems   Constitutional: Positive for fatigue and fever. Negative for activity change and appetite change.   HENT: Negative for congestion, ear pain, rhinorrhea and sinus pressure.    Respiratory: Positive for cough. Negative for chest tightness and shortness of breath.    Cardiovascular: Negative for chest pain and leg swelling.   Gastrointestinal: Positive for nausea. Negative for vomiting.   Musculoskeletal: Negative for arthralgias and myalgias.       Objective   Physical Exam   Constitutional: She is oriented to person, place, and time. Vital signs are normal. She appears well-developed and well-nourished. She is cooperative. She does not appear ill. No distress.   HENT:   Head: Normocephalic and atraumatic.   Right Ear: Hearing, tympanic membrane, external ear and ear canal normal. No drainage  or swelling. Tympanic membrane is not injected and not erythematous. No middle ear effusion.   Left Ear: Hearing, tympanic membrane, external ear and ear canal normal. No drainage or swelling. Tympanic membrane is not injected and not erythematous.  No middle ear effusion.   Nose: Nose normal.   Mouth/Throat: Uvula is midline, oropharynx is clear and moist and mucous membranes are normal. No tonsillar exudate.   Cardiovascular: Normal rate, regular rhythm and normal heart sounds.    No murmur heard.  Pulmonary/Chest: Effort normal and breath sounds normal. She has no decreased breath sounds. She has no wheezes. She has no rhonchi. She has no rales.   Lymphadenopathy:     She has no cervical adenopathy.        Right cervical: No superficial cervical, no deep cervical and no posterior cervical adenopathy present.       Left cervical: No superficial cervical, no deep cervical and no posterior cervical adenopathy present.   Neurological: She is alert and oriented to person, place, and time.   Skin: Skin is warm, dry and intact.   Psychiatric: She has a normal mood and affect. Her speech is normal and behavior is normal. Judgment and thought content normal. Cognition and memory are normal.   Nursing note and vitals reviewed.      Assessment/Plan   Problems Addressed this Visit     None      Visit Diagnoses     Cough    -  Primary    Relevant Medications    benzonatate (TESSALON) 100 MG capsule    Other Relevant Orders    XR Chest PA & Lateral    CBC & Differential    Acute viral syndrome            1. Cough  Patient with persistent cough for a number of weeks.  Lung assessment within normal limits.  She has complaints of subjective fevers for the past few days, will obtain chest x-ray and CBC to rule out any occult pneumonia. Discussed appropriate conservative and symptomatic treatment with patient, including use of NSAIDs or Tylenol for fever or pain, increase by mouth fluids, and rest. Discussed when to follow up for  recheck, including worsening symptoms such as fever, purulent nasal drainage, worsening cough, productive cough, etc.     - XR Chest PA & Lateral  - CBC & Differential  - benzonatate (TESSALON) 100 MG capsule; Take 1 capsule by mouth 3 (Three) Times a Day As Needed for Cough.  Dispense: 30 capsule; Refill: 0    2. Acute viral syndrome

## 2017-11-06 ENCOUNTER — TELEPHONE (OUTPATIENT)
Dept: INTERNAL MEDICINE | Age: 72
End: 2017-11-06

## 2017-11-06 NOTE — TELEPHONE ENCOUNTER
----- Message from CRYSTAL Graham sent at 11/6/2017  7:28 AM EST -----  Please call patient with results and send result letter to home address.    Ms. Thomson:     The results of your CBC show no obvious bacterial infection. I was waiting for your chest xray results as well, but it appears that it was not done. If you are feeling better, it was likely viral as we discussed. If you are still having symptoms or worsening cough, SOA, etc, please let me know as you may need to have this chest xray done.     Margaret ROJAS

## 2017-11-06 NOTE — TELEPHONE ENCOUNTER
Left VM for pt regarding lab results.    Pt was informed of negative results for bacterial infection.  Pt was instructed to call back if symptoms worsen, as Chest X-ray was not performed and may need to be done.    KD

## 2017-11-16 RX ORDER — DEXLANSOPRAZOLE 60 MG/1
CAPSULE, DELAYED RELEASE ORAL
Qty: 30 CAPSULE | Refills: 4 | Status: SHIPPED | OUTPATIENT
Start: 2017-11-16 | End: 2019-01-15

## 2017-11-17 ENCOUNTER — TELEPHONE (OUTPATIENT)
Dept: INTERNAL MEDICINE | Age: 72
End: 2017-11-17

## 2017-11-17 ENCOUNTER — HOSPITAL ENCOUNTER (OUTPATIENT)
Dept: GENERAL RADIOLOGY | Facility: HOSPITAL | Age: 72
Discharge: HOME | End: 2017-11-17
Admitting: NURSE PRACTITIONER

## 2017-11-17 PROCEDURE — 71020 HC CHEST PA AND LATERAL: CPT

## 2017-11-17 NOTE — TELEPHONE ENCOUNTER
----- Message from CRYSTAL Graham sent at 11/17/2017  2:12 PM EST -----  Please call patient with results and send result letter to home address.    Ms. Thomson:     Your chest x-ray was negative for any pneumonia or acute process.  I hope that at this time you're feeling better and recovering from your illness.  If symptoms persist or worsen, please feel free to follow-up as needed.    Margaret ROJAS

## 2017-11-17 NOTE — TELEPHONE ENCOUNTER
Spoke w/ pt re: chest X-ray results.    Pt was informed that X-ray did not show pneumonia or any other acute processes. Pt was advised to call office if s/s fail to improve or worsen.  Pt demonstrated understanding.    KD

## 2018-02-15 ENCOUNTER — OFFICE VISIT (OUTPATIENT)
Dept: INTERNAL MEDICINE | Age: 73
End: 2018-02-15

## 2018-02-15 ENCOUNTER — HOSPITAL ENCOUNTER (OUTPATIENT)
Dept: GENERAL RADIOLOGY | Facility: HOSPITAL | Age: 73
Discharge: HOME | End: 2018-02-15
Admitting: INTERNAL MEDICINE

## 2018-02-15 VITALS
HEART RATE: 71 BPM | TEMPERATURE: 98.1 F | HEIGHT: 65 IN | SYSTOLIC BLOOD PRESSURE: 134 MMHG | DIASTOLIC BLOOD PRESSURE: 76 MMHG | BODY MASS INDEX: 18.8 KG/M2 | OXYGEN SATURATION: 98 % | WEIGHT: 112.8 LBS

## 2018-02-15 DIAGNOSIS — J40 BRONCHITIS: Primary | ICD-10-CM

## 2018-02-15 PROCEDURE — 99213 OFFICE O/P EST LOW 20 MIN: CPT | Performed by: INTERNAL MEDICINE

## 2018-02-15 PROCEDURE — 71046 X-RAY EXAM CHEST 2 VIEWS: CPT

## 2018-02-15 RX ORDER — BENZONATATE 200 MG/1
200 CAPSULE ORAL 3 TIMES DAILY
Qty: 30 CAPSULE | Refills: 0 | Status: SHIPPED | OUTPATIENT
Start: 2018-02-15 | End: 2018-03-01

## 2018-02-15 RX ORDER — AZITHROMYCIN 250 MG/1
TABLET, FILM COATED ORAL
Qty: 6 TABLET | Refills: 0 | Status: SHIPPED | OUTPATIENT
Start: 2018-02-15 | End: 2018-03-01

## 2018-02-15 NOTE — PROGRESS NOTES
"Lona Thomson / 72 y.o. / female  02/15/2018  VITALS    Visit Vitals   • /76   • Pulse 71   • Temp 98.1 °F (36.7 °C)   • Ht 165.1 cm (65\")   • Wt 51.2 kg (112 lb 12.8 oz)   • SpO2 98%   • BMI 18.77 kg/m2       BP Readings from Last 3 Encounters:   02/15/18 134/76   11/02/17 132/82   08/01/17 132/84     Wt Readings from Last 3 Encounters:   02/15/18 51.2 kg (112 lb 12.8 oz)   11/02/17 54.3 kg (119 lb 12.8 oz)   08/01/17 54.5 kg (120 lb 3.2 oz)      Body mass index is 18.77 kg/(m^2).    CC:  Main reason(s) for today's visit: Cough (s/p flu)      HPI:     Patient presents today with upper respiratory symptoms.  She was seen approximately 10 days ago, diagnosed with influenza and an immediate care center.  Unfortunately she is beyond a five-day window being treated with Tamiflu.  She treated with over-the-counter TheraFlu.  The symptoms of the flu seemed to improve she discontinue medication over this past weekend.  Symptoms seemingly have worsened over the last evening.  Presently she has a cough which is loose but productive of scant amounts of sputum which is nonpurulent.  She also has malaise, some sweating, but no chills or fever noted.  She does have some wheezing but denied any chest pain or shortness of breath; there has been no heartburn nor postnasal drainage noted.  She is a nonsmoker.  She has not had a chest x-ray recently.  There has been no other over-the-counter treatment tried.    Patient Care Team:  Duy Linn MD as PCP - General (Internal Medicine)  Ashlee Hampton MD as PCP - Claims Attributed  Thad Brumfield MD as Consulting Physician (Gastroenterology)  Adeola Kaufman MD as Consulting Physician (Gynecology)  Ashlee Hampton MD (Psychiatry)  ____________________________________________________________________    ASSESSMENT & PLAN:    Problem List Items Addressed This Visit     None      Visit Diagnoses     Bronchitis    -  Primary    Relevant Medications    " benzonatate (TESSALON) 200 MG capsule    Other Relevant Orders    XR Chest 2 View        Orders Placed This Encounter   Procedures   • XR Chest 2 View       Summary/Discussion:  · Number-one persistent cough in a patient status post recent influenza infection.  I do not believe this is persistent influenza as she has no symptoms consistent with such.  I suspect this is rather secondary infection, either bronchitis or pneumonia.  We'll treat with Z-Jaciel times one and Tessalon ×10 days.  Also check chest x-ray to rule out pneumonia.  Patient will contact me in one week if her symptoms have not improved, or sooner if they worsen.  If they worsen over this upcoming week and she is directed to go to the emergency room or [facility].    Return if symptoms worsen or fail to improve.    Future Appointments  Date Time Provider Department Center   3/1/2018 10:30 AM Thad Brumfield MD [facility] None       ______________________________________________________    REVIEW OF SYSTEMS    Review of Systems   Constitutional: Positive for appetite change.   Gastrointestinal: Negative for diarrhea, nausea and vomiting.   Musculoskeletal: Negative for arthralgias and myalgias.   Neurological: Negative for headaches.           PHYSICAL EXAMINATION    Physical Exam   Constitutional: She is oriented to person, place, and time.   HENT:   Nose: Right sinus exhibits no maxillary sinus tenderness and no frontal sinus tenderness. Left sinus exhibits no maxillary sinus tenderness and no frontal sinus tenderness.   Mouth/Throat: No posterior oropharyngeal edema or posterior oropharyngeal erythema.   Neck: Normal range of motion. Neck supple.   Pulmonary/Chest: Effort normal and breath sounds normal.   No egophony noted   Lymphadenopathy:     She has no cervical adenopathy.   Neurological: She is alert and oriented to person, place, and time.   Skin: Skin is warm and dry.   Psychiatric: She has a normal mood and affect. Her behavior is  normal. Judgment and thought content normal.   Nursing note and vitals reviewed.      REVIEWED DATA:    Labs:     Lab Results   Component Value Date     07/13/2017    K 4.3 07/13/2017    AST 17 07/13/2017    ALT 18 07/13/2017    BUN 15 07/13/2017    CREATININE 0.83 07/13/2017    CREATININE 0.90 07/11/2017    CREATININE 0.90 01/03/2017    EGFRIFNONA 68 07/13/2017    EGFRIFAFRI 83 07/13/2017       Lab Results   Component Value Date     (H) 07/13/2017    GLU 99 09/30/2016       No results found for: LDL, HDL, TRIG, CHOLHDLRATIO    Lab Results   Component Value Date    TSH 1.970 07/13/2017    FREET4 1.04 07/13/2017       Lab Results   Component Value Date    WBC 9.03 11/02/2017    HGB 15.0 11/02/2017    HGB 14.7 07/13/2017    HGB 15.7 (H) 09/30/2016     11/02/2017       Imaging:         Medical Tests:         Summary of old records / correspondence / consultant report:         Request outside records:         ALLERGIES  Allergies   Allergen Reactions   • Lexapro [Escitalopram Oxalate]    • Prozac [Fluoxetine Hcl]         MEDICATIONS  Current Outpatient Prescriptions   Medication Sig Dispense Refill   • azithromycin (ZITHROMAX Z-NIKO) 250 MG tablet Take 2 tablets the first day, then 1 tablet daily for 4 days. 6 tablet 0   • benzonatate (TESSALON) 200 MG capsule Take 1 capsule by mouth 3 (Three) Times a Day. 30 capsule 0   • DEXILANT 60 MG capsule TAKE ONE CAPSULE BY MOUTH DAILY 30 capsule 4   • lamoTRIgine (LaMICtal) 100 MG tablet Take 1.5 tablets by mouth 2 (Two) Times a Day.     • prochlorperazine (COMPAZINE) 5 MG tablet TAKE 1 TABLET BY MOUTH EVERY 6 (SIX) HOURS AS NEEDED FOR NAUSEA OR VOMITING. 60 tablet 2     No current facility-administered medications for this visit.        PFSH:     The following portions of the patient's history were reviewed and updated as appropriate: Allergies / Current Medications / Past Medical History / Surgical History / Social History / Family History    PROBLEM LIST    Patient Active Problem List   Diagnosis   • Cervical myelopathy   • Cervicogenic headache   • Atypical migraine   • Chronic nausea   • Smoking 1/2 pack a day or less   • Muscle spasm   • Dyspepsia   • Tubular adenoma of colon   • Fatigue   • Bipolar disorder       PAST MEDICAL HISTORY  Past Medical History:   Diagnosis Date   • Bipolar affect, depressed    • Cervical myelopathy    • Depression    • Gastritis    • GERD (gastroesophageal reflux disease)    • Migraine headache        SURGICAL HISTORY  Past Surgical History:   Procedure Laterality Date   • COLONOSCOPY  05/2013   • COLONOSCOPY N/A 10/31/2016    polyps, tics, IHtubular adenoma w/low grade dysplasia x 2, hyperplastic polyp x3   • ENDOSCOPY  11/2013   • ENDOSCOPY N/A 10/31/2016    erythematous mucosa in stomach   • TONSILLECTOMY         SOCIAL HISTORY  Social History     Social History   • Marital status: Single     Spouse name: N/A   • Number of children: 0   • Years of education: N/A     Occupational History   •       Social History Main Topics   • Smoking status: Former Smoker     Packs/day: 0.25     Years: 12.00     Types: Cigarettes     Quit date: 03/2016   • Smokeless tobacco: Never Used      Comment: We discussed nicotine patch, gum, hypnosis.   • Alcohol use Yes      Comment: social   • Drug use: No   • Sexual activity: Not Asked     Other Topics Concern   • None     Social History Narrative       FAMILY HISTORY  Family History   Problem Relation Age of Onset   • Stroke Mother    • Cancer Father    • Heart valve disorder Other    • Hypertension Other    • Migraines Other    • Cancer Maternal Grandmother    • Cancer Cousin          **Beverly Disclaimer:   Much of this encounter note is an electronic transcription/translation of spoken language to printed text. The electronic translation of spoken language may permit erroneous, or at times, nonsensical words or phrases to be inadvertently transcribed. Although I have reviewed the note for  such errors, some may still exist.

## 2018-03-01 ENCOUNTER — OFFICE VISIT (OUTPATIENT)
Dept: GASTROENTEROLOGY | Facility: CLINIC | Age: 73
End: 2018-03-01

## 2018-03-01 VITALS
DIASTOLIC BLOOD PRESSURE: 88 MMHG | TEMPERATURE: 98.8 F | WEIGHT: 114 LBS | HEIGHT: 65 IN | BODY MASS INDEX: 18.99 KG/M2 | SYSTOLIC BLOOD PRESSURE: 132 MMHG

## 2018-03-01 DIAGNOSIS — R19.7 DIARRHEA, UNSPECIFIED TYPE: Primary | ICD-10-CM

## 2018-03-01 DIAGNOSIS — C25.1 MALIGNANT NEOPLASM OF BODY OF PANCREAS (HCC): ICD-10-CM

## 2018-03-01 DIAGNOSIS — K86.2 PANCREATIC CYST: ICD-10-CM

## 2018-03-01 PROCEDURE — 99213 OFFICE O/P EST LOW 20 MIN: CPT | Performed by: INTERNAL MEDICINE

## 2018-03-01 NOTE — PROGRESS NOTES
Chief Complaint   Patient presents with   • Follow-up     MRI Abdomen       History of Present Illness: 73 yo female who c/o she has to move her bowels a little bit at a time. It may take 2 hrs to finish moving her bowels and then she is pretty much done for the day. Stool may be hard at first and then softer. She eats lost of fruits. No rectal bleeding or melena. Some nausea but no vomiting. No abdominal pain or chest pain. Her weight has been stable. Dexilant 60 mg/day helps.     Past Medical History:   Diagnosis Date   • Bipolar affect, depressed    • Cervical myelopathy    • Depression    • Gastritis    • GERD (gastroesophageal reflux disease)    • Migraine headache        Past Surgical History:   Procedure Laterality Date   • COLONOSCOPY  05/2013   • COLONOSCOPY N/A 10/31/2016    polyps, tics, IHtubular adenoma w/low grade dysplasia x 2, hyperplastic polyp x3   • ENDOSCOPY  11/2013   • ENDOSCOPY N/A 10/31/2016    erythematous mucosa in stomach   • TONSILLECTOMY           Current Outpatient Prescriptions:   •  DEXILANT 60 MG capsule, TAKE ONE CAPSULE BY MOUTH DAILY, Disp: 30 capsule, Rfl: 4  •  lamoTRIgine (LaMICtal) 100 MG tablet, Take 1.5 tablets by mouth 2 (Two) Times a Day., Disp: , Rfl:   •  prochlorperazine (COMPAZINE) 5 MG tablet, TAKE 1 TABLET BY MOUTH EVERY 6 (SIX) HOURS AS NEEDED FOR NAUSEA OR VOMITING., Disp: 60 tablet, Rfl: 2    Allergies   Allergen Reactions   • Lexapro [Escitalopram Oxalate] GI Intolerance     nausea   • Prozac [Fluoxetine Hcl] Nausea Only       Family History   Problem Relation Age of Onset   • Stroke Mother    • Cancer Father    • Heart valve disorder Other    • Hypertension Other    • Migraines Other    • Cancer Maternal Grandmother    • Cancer Cousin        Social History     Social History   • Marital status: Single     Spouse name: N/A   • Number of children: 0   • Years of education: N/A     Occupational History   •       Social History Main Topics   • Smoking  status: Former Smoker     Packs/day: 0.25     Years: 12.00     Types: Cigarettes     Quit date: 03/2016   • Smokeless tobacco: Never Used      Comment: We discussed nicotine patch, gum, hypnosis.   • Alcohol use Yes      Comment: social   • Drug use: No   • Sexual activity: Not on file     Other Topics Concern   • Not on file     Social History Narrative       Review of Systems   All other systems reviewed and are negative.      Vitals:    03/01/18 1033   BP: 132/88   Temp: 98.8 °F (37.1 °C)       Physical Exam   Constitutional: She is oriented to person, place, and time. She appears well-developed and well-nourished. No distress.   HENT:   Head: Normocephalic and atraumatic. Hair is normal.   Right Ear: Hearing, tympanic membrane, external ear and ear canal normal. No drainage. No decreased hearing is noted.   Left Ear: Hearing, tympanic membrane, external ear and ear canal normal. No decreased hearing is noted.   Nose: No nasal deformity.   Mouth/Throat: Oropharynx is clear and moist.   Eyes: Conjunctivae, EOM and lids are normal. Pupils are equal, round, and reactive to light. Right eye exhibits no discharge. Left eye exhibits no discharge.   Neck: Normal range of motion. Neck supple. No JVD present. No tracheal deviation present. No thyromegaly present.   Cardiovascular: Normal rate, regular rhythm, normal heart sounds, intact distal pulses and normal pulses.  Exam reveals no gallop and no friction rub.    No murmur heard.  Pulmonary/Chest: Effort normal and breath sounds normal. No respiratory distress. She has no wheezes. She has no rales. She exhibits no tenderness.   Abdominal: Soft. Bowel sounds are normal. She exhibits no distension and no mass. There is no tenderness. There is no rebound and no guarding. No hernia.   Genitourinary: Rectal exam shows guaiac negative stool.   Genitourinary Comments: Normal anorectal exam.   Musculoskeletal: Normal range of motion. She exhibits no edema, tenderness or  deformity.   Lymphadenopathy:     She has no cervical adenopathy.   Neurological: She is alert and oriented to person, place, and time. She has normal reflexes. She displays normal reflexes. No cranial nerve deficit. She exhibits normal muscle tone. Coordination normal.   Skin: Skin is warm and dry. No rash noted. She is not diaphoretic. No erythema.   Psychiatric: She has a normal mood and affect. Her behavior is normal. Judgment and thought content normal.   Vitals reviewed.      Lona was seen today for follow-up.    Diagnoses and all orders for this visit:    Diarrhea, unspecified type  -     Fecal Fat, Qualitative - Stool, Per Rectum  -     CBC & Differential  -     Comprehensive Metabolic Panel  -     Amylase  -     Lipase  -     Cancer Antigen 19-9    Pancreatic cyst  -     CBC & Differential  -     Comprehensive Metabolic Panel  -     Amylase  -     Lipase  -     Cancer Antigen 19-9    Malignant neoplasm of body of pancreas   -     Cancer Antigen 19-9       Assessment:  1) Frequent BM's  2) GERD  3) Small lesion in the uncinate process of the pancreas. She is due for a repeat MRI of the abdomen in 7/18.    Recommendations:  1) Stool for fecal fat  2) Trial of fiber supplement  3) Labs: CBC, CMP, CA 19-9  4) f/u 6 weeks    Return in about 6 weeks (around 4/12/2018).    Thad Brumfield MD  3/1/2018

## 2018-03-02 LAB
ALBUMIN SERPL-MCNC: 4.6 G/DL (ref 3.5–5.2)
ALBUMIN/GLOB SERPL: 1.8 G/DL
ALP SERPL-CCNC: 112 U/L (ref 39–117)
ALT SERPL-CCNC: 15 U/L (ref 1–33)
AMYLASE SERPL-CCNC: 75 U/L (ref 28–100)
AST SERPL-CCNC: 15 U/L (ref 1–32)
BASOPHILS # BLD AUTO: 0.04 10*3/MM3 (ref 0–0.2)
BASOPHILS NFR BLD AUTO: 0.4 % (ref 0–1.5)
BILIRUB SERPL-MCNC: 0.2 MG/DL (ref 0.1–1.2)
BUN SERPL-MCNC: 16 MG/DL (ref 8–23)
BUN/CREAT SERPL: 18.8 (ref 7–25)
CALCIUM SERPL-MCNC: 10.2 MG/DL (ref 8.6–10.5)
CANCER AG19-9 SERPL-ACNC: 11 U/ML (ref 0–35)
CHLORIDE SERPL-SCNC: 102 MMOL/L (ref 98–107)
CO2 SERPL-SCNC: 32 MMOL/L (ref 22–29)
CREAT SERPL-MCNC: 0.85 MG/DL (ref 0.57–1)
EOSINOPHIL # BLD AUTO: 0.01 10*3/MM3 (ref 0–0.7)
EOSINOPHIL NFR BLD AUTO: 0.1 % (ref 0.3–6.2)
ERYTHROCYTE [DISTWIDTH] IN BLOOD BY AUTOMATED COUNT: 13.3 % (ref 11.7–13)
GFR SERPLBLD CREATININE-BSD FMLA CKD-EPI: 66 ML/MIN/1.73
GFR SERPLBLD CREATININE-BSD FMLA CKD-EPI: 80 ML/MIN/1.73
GLOBULIN SER CALC-MCNC: 2.5 GM/DL
GLUCOSE SERPL-MCNC: 87 MG/DL (ref 65–99)
HCT VFR BLD AUTO: 42.9 % (ref 35.6–45.5)
HGB BLD-MCNC: 14.2 G/DL (ref 11.9–15.5)
IMM GRANULOCYTES # BLD: 0.03 10*3/MM3 (ref 0–0.03)
IMM GRANULOCYTES NFR BLD: 0.3 % (ref 0–0.5)
LIPASE SERPL-CCNC: 34 U/L (ref 13–60)
LYMPHOCYTES # BLD AUTO: 2.01 10*3/MM3 (ref 0.9–4.8)
LYMPHOCYTES NFR BLD AUTO: 21.1 % (ref 19.6–45.3)
MCH RBC QN AUTO: 32.6 PG (ref 26.9–32)
MCHC RBC AUTO-ENTMCNC: 33.1 G/DL (ref 32.4–36.3)
MCV RBC AUTO: 98.4 FL (ref 80.5–98.2)
MONOCYTES # BLD AUTO: 0.66 10*3/MM3 (ref 0.2–1.2)
MONOCYTES NFR BLD AUTO: 6.9 % (ref 5–12)
NEUTROPHILS # BLD AUTO: 6.76 10*3/MM3 (ref 1.9–8.1)
NEUTROPHILS NFR BLD AUTO: 71.2 % (ref 42.7–76)
PLATELET # BLD AUTO: 411 10*3/MM3 (ref 140–500)
POTASSIUM SERPL-SCNC: 4.5 MMOL/L (ref 3.5–5.2)
PROT SERPL-MCNC: 7.1 G/DL (ref 6–8.5)
RBC # BLD AUTO: 4.36 10*6/MM3 (ref 3.9–5.2)
SODIUM SERPL-SCNC: 142 MMOL/L (ref 136–145)
WBC # BLD AUTO: 9.51 10*3/MM3 (ref 4.5–10.7)

## 2018-03-05 ENCOUNTER — TELEPHONE (OUTPATIENT)
Dept: GASTROENTEROLOGY | Facility: CLINIC | Age: 73
End: 2018-03-05

## 2018-03-05 NOTE — TELEPHONE ENCOUNTER
----- Message from Thad Brumfield MD sent at 3/5/2018  6:58 AM EST -----  Tell her that her labs look good. Her pancreatic cancer tumor marker () came back normal. emil

## 2018-03-05 NOTE — TELEPHONE ENCOUNTER
Call to pt.  Advise per Dr Brumfield that labs look good.  Pancreatic tumor marker came back normal.  Pt verb understanding.

## 2018-03-22 LAB
FAT STL QL: NORMAL
NEUTRAL FAT STL QL: NORMAL

## 2018-03-28 ENCOUNTER — TELEPHONE (OUTPATIENT)
Dept: GASTROENTEROLOGY | Facility: CLINIC | Age: 73
End: 2018-03-28

## 2018-03-28 NOTE — TELEPHONE ENCOUNTER
----- Message from Thad Brumfield MD sent at 3/27/2018  3:39 PM EDT -----  Tell her that her stool for fecal fat came back normal, which is good. emil

## 2018-03-28 NOTE — TELEPHONE ENCOUNTER
Called pt and advised per Dr Brumfield that the stool for fecal fat came back normal which is good. Pt verb understanding.

## 2018-05-02 ENCOUNTER — OFFICE VISIT (OUTPATIENT)
Dept: GASTROENTEROLOGY | Facility: CLINIC | Age: 73
End: 2018-05-02

## 2018-05-02 VITALS
DIASTOLIC BLOOD PRESSURE: 86 MMHG | WEIGHT: 111.6 LBS | TEMPERATURE: 98.7 F | HEIGHT: 65 IN | SYSTOLIC BLOOD PRESSURE: 132 MMHG | BODY MASS INDEX: 18.59 KG/M2

## 2018-05-02 DIAGNOSIS — K86.2 PANCREAS CYST: ICD-10-CM

## 2018-05-02 DIAGNOSIS — R19.7 DIARRHEA, UNSPECIFIED TYPE: ICD-10-CM

## 2018-05-02 DIAGNOSIS — K63.5 POLYP OF COLON, UNSPECIFIED PART OF COLON, UNSPECIFIED TYPE: ICD-10-CM

## 2018-05-02 DIAGNOSIS — K21.9 GASTROESOPHAGEAL REFLUX DISEASE, ESOPHAGITIS PRESENCE NOT SPECIFIED: ICD-10-CM

## 2018-05-02 DIAGNOSIS — R10.84 GENERALIZED ABDOMINAL PAIN: Primary | ICD-10-CM

## 2018-05-02 PROCEDURE — 99214 OFFICE O/P EST MOD 30 MIN: CPT | Performed by: INTERNAL MEDICINE

## 2018-05-02 NOTE — PROGRESS NOTES
Chief Complaint   Patient presents with   • Follow-up   • Nausea       History of Present Illness: 71 yo female who said the fiber has helped the ease of a BM. She jumps out of bed about 9 times (with stool urgency) after 5:30 am to finally finish having a BM. No constipaiton or diarrhea. Some nausea. I don't eat meat. NO rectal bleeding or melena. Some mid abdominal pain that is worse before and after BM. It is better after BM.  She has lost some weight but she walks daily. On lamictal x 10 yrs.     Past Medical History:   Diagnosis Date   • Bipolar affect, depressed    • Cervical myelopathy    • Depression    • Gastritis    • GERD (gastroesophageal reflux disease)    • Migraine headache        Past Surgical History:   Procedure Laterality Date   • COLONOSCOPY  05/2013   • COLONOSCOPY N/A 10/31/2016    polyps, tics, IHtubular adenoma w/low grade dysplasia x 2, hyperplastic polyp x3   • ENDOSCOPY  11/2013   • ENDOSCOPY N/A 10/31/2016    erythematous mucosa in stomach   • TONSILLECTOMY           Current Outpatient Prescriptions:   •  lamoTRIgine (LaMICtal) 100 MG tablet, Take 1.5 tablets by mouth 2 (Two) Times a Day., Disp: , Rfl:   •  prochlorperazine (COMPAZINE) 5 MG tablet, TAKE 1 TABLET BY MOUTH EVERY 6 (SIX) HOURS AS NEEDED FOR NAUSEA OR VOMITING., Disp: 60 tablet, Rfl: 2  •  UNKNOWN TO PATIENT, Pt states she is on a new anti-depressant but does not know the name of it., Disp: , Rfl:   •  DEXILANT 60 MG capsule, TAKE ONE CAPSULE BY MOUTH DAILY, Disp: 30 capsule, Rfl: 4    Allergies   Allergen Reactions   • Lexapro [Escitalopram Oxalate] GI Intolerance     nausea   • Prozac [Fluoxetine Hcl] Nausea Only       Family History   Problem Relation Age of Onset   • Stroke Mother    • Cancer Father    • Heart valve disorder Other    • Hypertension Other    • Migraines Other    • Cancer Maternal Grandmother    • Cancer Cousin        Social History     Social History   • Marital status: Single     Spouse name: N/A   •  Number of children: 0   • Years of education: N/A     Occupational History   •       Social History Main Topics   • Smoking status: Former Smoker     Packs/day: 0.25     Years: 12.00     Types: Cigarettes     Quit date: 03/2016   • Smokeless tobacco: Never Used      Comment: We discussed nicotine patch, gum, hypnosis.   • Alcohol use Yes      Comment: social   • Drug use: No   • Sexual activity: Not on file     Other Topics Concern   • Not on file     Social History Narrative   • No narrative on file       Review of Systems   All other systems reviewed and are negative.      Vitals:    05/02/18 1302   BP: 132/86   Temp: 98.7 °F (37.1 °C)       Physical Exam   Constitutional: She is oriented to person, place, and time. She appears well-developed and well-nourished. No distress.   HENT:   Head: Normocephalic and atraumatic. Hair is normal.   Right Ear: Hearing, tympanic membrane, external ear and ear canal normal. No drainage. No decreased hearing is noted.   Left Ear: Hearing, tympanic membrane, external ear and ear canal normal. No decreased hearing is noted.   Nose: No nasal deformity.   Mouth/Throat: Oropharynx is clear and moist.   Eyes: Conjunctivae, EOM and lids are normal. Pupils are equal, round, and reactive to light. Right eye exhibits no discharge. Left eye exhibits no discharge.   Neck: Normal range of motion. Neck supple. No JVD present. No tracheal deviation present. No thyromegaly present.   Cardiovascular: Normal rate, regular rhythm, normal heart sounds, intact distal pulses and normal pulses.  Exam reveals no gallop and no friction rub.    No murmur heard.  Pulmonary/Chest: Effort normal and breath sounds normal. No respiratory distress. She has no wheezes. She has no rales. She exhibits no tenderness.   Abdominal: Soft. Bowel sounds are normal. She exhibits no distension and no mass. There is no tenderness. There is no rebound and no guarding. No hernia.   Musculoskeletal: Normal range of  motion. She exhibits no edema, tenderness or deformity.   Lymphadenopathy:     She has no cervical adenopathy.   Neurological: She is alert and oriented to person, place, and time. She has normal reflexes. She displays normal reflexes. No cranial nerve deficit. She exhibits normal muscle tone. Coordination normal.   Skin: Skin is warm and dry. No rash noted. She is not diaphoretic. No erythema.   Psychiatric: She has a normal mood and affect. Her behavior is normal. Judgment and thought content normal.   Vitals reviewed.      Lona was seen today for follow-up and nausea.    Diagnoses and all orders for this visit:    Generalized abdominal pain    Diarrhea, unspecified type    Gastroesophageal reflux disease, esophagitis presence not specified    Polyp of colon, unspecified part of colon, unspecified type    Pancreas cyst      Assessment:  1) Frequent BM's. The fiber has helped. Could this be from Lamictal?  2) GERD  3) Small lesion in the uncinate process of the pancreas. She is due for a repeat MRI of the abdomen in 7/18.  4) h/o colonic adenomas.     Recommendations:  1) Repeat MRI of the pancreas  2) f/u 6 mos    Return in about 6 months (around 11/2/2018).    Thad Brumfield MD  5/2/2018

## 2018-05-08 RX ORDER — PROCHLORPERAZINE MALEATE 5 MG/1
TABLET ORAL
Qty: 60 TABLET | Refills: 2 | Status: SHIPPED | OUTPATIENT
Start: 2018-05-08 | End: 2018-11-09 | Stop reason: SDUPTHER

## 2018-05-09 ENCOUNTER — TELEPHONE (OUTPATIENT)
Dept: INTERNAL MEDICINE | Age: 73
End: 2018-05-09

## 2018-05-09 DIAGNOSIS — M25.30 JOINT INSTABILITY: Primary | ICD-10-CM

## 2018-05-09 NOTE — TELEPHONE ENCOUNTER
Pt states she fell approx 1 month a go & fell again at the grocery approx 3 weeks ago.    Pt is requesting a referral to see an Orthopedic specs due to RT knee keeps collapsing.    Pls advise.    Pt can be reached #[phone].

## 2018-05-09 NOTE — TELEPHONE ENCOUNTER
I will be happy to see her first, evaluate, get an x-ray or if she prefers, send her to orthopedics diagnoses joint instability

## 2018-06-27 ENCOUNTER — OFFICE VISIT (OUTPATIENT)
Dept: ORTHOPEDIC SURGERY | Facility: CLINIC | Age: 73
End: 2018-06-27

## 2018-06-27 VITALS — WEIGHT: 113 LBS | HEIGHT: 66 IN | BODY MASS INDEX: 18.16 KG/M2 | TEMPERATURE: 97.8 F

## 2018-06-27 DIAGNOSIS — M25.561 CHRONIC PAIN OF RIGHT KNEE: Primary | ICD-10-CM

## 2018-06-27 DIAGNOSIS — G89.29 CHRONIC PAIN OF RIGHT KNEE: Primary | ICD-10-CM

## 2018-06-27 PROCEDURE — 73562 X-RAY EXAM OF KNEE 3: CPT | Performed by: ORTHOPAEDIC SURGERY

## 2018-06-27 PROCEDURE — 99203 OFFICE O/P NEW LOW 30 MIN: CPT | Performed by: ORTHOPAEDIC SURGERY

## 2018-06-27 RX ORDER — LORAZEPAM 0.5 MG/1
0.5 TABLET ORAL 2 TIMES DAILY PRN
Refills: 2 | COMMUNITY
Start: 2018-04-11

## 2018-06-27 NOTE — PROGRESS NOTES
"Patient Name: Lona Thomson   YOB: 1945  Referring Primary Care Physician: Duy Linn MD  BMI: Body mass index is 18.24 kg/m².    Chief Complaint:    Chief Complaint   Patient presents with   • Right Knee - Pain        Subjective:    HPI:   Lona Thomson is a pleasant 72 y.o. year old who presents today for evaluation of   Chief Complaint   Patient presents with   • Right Knee - Pain    (Location). Duration: This has been going on for about a year when fell backwards at an art gallery and right knee buckled.  feels \"unstable\" and occasional dull pain.  no locking.  bothers more when tired.  pull up brace helps.  used to sol hunt etc but did not dmage Timing: The pain is intermittent. Context or causative factors: misstep.  Relieving Factors:  In the past has tried brace.     This problem is new to this examiner.     Medications:   Home Medications:  Current Outpatient Prescriptions on File Prior to Visit   Medication Sig   • DEXILANT 60 MG capsule TAKE ONE CAPSULE BY MOUTH DAILY (Patient taking differently: TAKE ONE CAPSULE BY MOUTH DAILY prn)   • lamoTRIgine (LaMICtal) 100 MG tablet Take 1.5 tablets by mouth 2 (Two) Times a Day.   • prochlorperazine (COMPAZINE) 5 MG tablet TAKE 1 TABLET BY MOUTH EVERY 6 (SIX) HOURS AS NEEDED FOR NAUSEA OR VOMITING.   • UNKNOWN TO PATIENT Pt states she is on a new anti-depressant but does not know the name of it.     No current facility-administered medications on file prior to visit.      Current Medications:  Scheduled Meds:  Continuous Infusions:  No current facility-administered medications for this visit.   PRN Meds:.    I have reviewed the patient's medical history in detail and updated the computerized patient record.  Review and summarization of old records includes:    Past Medical History:   Diagnosis Date   • Bipolar affect, depressed    • Cervical myelopathy    • Depression    • Gastritis    • GERD (gastroesophageal reflux disease)    • " Migraine headache         Past Surgical History:   Procedure Laterality Date   • COLONOSCOPY  05/2013   • COLONOSCOPY N/A 10/31/2016    polyps, tics, IHtubular adenoma w/low grade dysplasia x 2, hyperplastic polyp x3   • ENDOSCOPY  11/2013   • ENDOSCOPY N/A 10/31/2016    erythematous mucosa in stomach   • TONSILLECTOMY          Social History     Occupational History   •       Social History Main Topics   • Smoking status: Former Smoker     Packs/day: 0.25     Years: 12.00     Types: Cigarettes     Quit date: 2010   • Smokeless tobacco: Never Used      Comment: We discussed nicotine patch, gum, hypnosis.   • Alcohol use Yes      Comment: social   • Drug use: Unknown   • Sexual activity: Defer      Social History     Social History Narrative   • No narrative on file        Family History   Problem Relation Age of Onset   • Stroke Mother    • Cancer Father    • Cancer Maternal Grandmother    • Stroke Maternal Grandmother        ROS: 14 point review of systems was performed and all other systems were reviewed and are negative except for documented findings in HPI and today's encounter.     Allergies:   Allergies   Allergen Reactions   • Lexapro [Escitalopram Oxalate] GI Intolerance     nausea   • Prozac [Fluoxetine Hcl] Nausea Only     Constitutional:  Denies fever, shaking or chills   Eyes:  Denies change in visual acuity   HENT:  Denies nasal congestion or sore throat   Respiratory:  Denies cough or shortness of breath   Cardiovascular:  Denies chest pain or severe LE edema   GI:  Denies abdominal pain, nausea, vomiting, bloody stools or diarrhea   Musculoskeletal:  Numbness, tingling, pain, or loss of motor function only as noted above in history of present illness.  : Denies painful urination or hematuria  Integument:  Denies rash, lesion or ulceration   Neurologic:  Denies headache or focal weakness  Endocrine:  Denies lymphadenopathy  Psych:  Denies confusion or change in mental status   Hem:  Denies  "active bleeding    Subjective     Objective:    Physical Exam: 72 y.o. female  Wt Readings from Last 3 Encounters:   06/27/18 51.3 kg (113 lb)   05/02/18 50.6 kg (111 lb 9.6 oz)   03/01/18 51.7 kg (114 lb)     Ht Readings from Last 3 Encounters:   06/27/18 167.6 cm (66\")   05/02/18 165.1 cm (65\")   03/01/18 165.1 cm (65\")     Body mass index is 18.24 kg/m².    Vitals:    06/27/18 1440   Temp: 97.8 °F (36.6 °C)       Vital signs reviewed.   General Appearance:    Alert, cooperative, in no acute distress                  Eyes: conjunctiva clear  ENT: external ears and nose atraumatic  CV: no peripheral edema  Resp: normal respiratory effort  Skin: no rashes or wounds; normal turgor  Psych: mood and affect appropriate  Lymph: no nodes appreciated  Neuro: gross sensation intact  Vascular:  Palpable peripheral pulse in noted extremity  Musculoskeletal Extremities: dalila pf crep, good rom and no instability found, mcm neg, ambulates and transfers well. walks 4 miles per day.      Radiology:   Imaging done today and discussed at length with the patient:    Indication: pain related symptoms,  Views: 3V AP, LAT & 40 degree PA right knee(s)   Findings: arthritic changes  Comparison views: not available      Assessment:     ICD-10-CM ICD-9-CM   1. Chronic pain of right knee M25.561 719.46    G89.29 338.29        Procedures       Plan: Biomechanics of pertinent body area discussed.  Risks, benefits, alternatives, comparisons, and complications of accepted medicines, injections, recommendations, surgical procedures, and therapies explained and education provided in laymen's terms. The patient was given the opportunity to ask questions and they were answerved to their satisfaction.   Natural history and expected course of this patient's diagnosis discussed along with evaluation of therapies. Questions answered.  OTC analgesics as needed with dosage warning and instructions given: OTC meds: Naproxen  Cryotherapy/brachy therapy as " indicated with instructions.   Compression/elastic brace/support sleeve.   Biomechanics and proper use of ambulatory aids:  crutches, walker, cane, &/or trekking poles.      6/27/2018

## 2018-07-16 ENCOUNTER — TELEPHONE (OUTPATIENT)
Dept: INTERNAL MEDICINE | Age: 73
End: 2018-07-16

## 2018-07-16 NOTE — TELEPHONE ENCOUNTER
Pt called stating she spoke with [facility] scheduling and was told her MRI of abdomen had been cancelled. Pt called Dr Brumfield's office (gastro) and they told the pt that Dr Linn's office had cancelled the MRI. Pt confused on why it was cancelled?  Pt's # [phone]  Thanks SP

## 2018-07-17 ENCOUNTER — APPOINTMENT (OUTPATIENT)
Dept: MRI IMAGING | Facility: HOSPITAL | Age: 73
End: 2018-07-17
Attending: INTERNAL MEDICINE

## 2018-07-19 ENCOUNTER — HOSPITAL ENCOUNTER (OUTPATIENT)
Dept: MRI IMAGING | Facility: HOSPITAL | Age: 73
Discharge: HOME | End: 2018-07-19
Attending: INTERNAL MEDICINE | Admitting: INTERNAL MEDICINE

## 2018-07-19 DIAGNOSIS — K86.89 PANCREATIC MASS: ICD-10-CM

## 2018-07-19 PROCEDURE — A9577 INJ MULTIHANCE: HCPCS | Performed by: INTERNAL MEDICINE

## 2018-07-19 PROCEDURE — 82565 ASSAY OF CREATININE: CPT

## 2018-07-19 PROCEDURE — 74183 MRI ABD W/O CNTR FLWD CNTR: CPT

## 2018-07-19 PROCEDURE — 0 GADOBENATE DIMEGLUMINE 529 MG/ML SOLUTION: Performed by: INTERNAL MEDICINE

## 2018-07-19 RX ADMIN — GADOBENATE DIMEGLUMINE 10 ML: 529 INJECTION, SOLUTION INTRAVENOUS at 10:13

## 2018-07-20 LAB — CREAT BLDA-MCNC: 0.8 MG/DL (ref 0.6–1.3)

## 2018-08-28 ENCOUNTER — TELEPHONE (OUTPATIENT)
Dept: GASTROENTEROLOGY | Facility: CLINIC | Age: 73
End: 2018-08-28

## 2018-08-28 NOTE — TELEPHONE ENCOUNTER
Called pt and advised per Dr Brumfield that the mri of the abd came back unchanged with a sm cyst in the pancreas that is unchanged since 11/16.  There are also many sm cysts in the liver ( which should not turn into anything bad and should not cause any trouble).  He will discuss in more detail at her f/u in Nov.  Pt verb understanding.

## 2018-08-28 NOTE — TELEPHONE ENCOUNTER
----- Message from Thad Brumfield MD sent at 7/30/2018  9:12 AM EDT -----  Tell her that the MRI of the abdomen (MRCP) came back unchanged with a small cyst in the pancreas that is unchanged since 11/16. There are also many small cysts in the liver (which shouldn't turn into anything bad and shouldn't cause any trouble). We can discuss in more detail when I see her in f/u in the office. I think that this is all good.  Thx. kjh

## 2018-11-09 RX ORDER — PROCHLORPERAZINE MALEATE 5 MG/1
TABLET ORAL
Qty: 60 TABLET | Refills: 2 | Status: SHIPPED | OUTPATIENT
Start: 2018-11-09 | End: 2019-01-08 | Stop reason: SDUPTHER

## 2018-11-09 NOTE — TELEPHONE ENCOUNTER
Please refill enough compazine till she sees me or Ms. Rubi NP next (in the next few months). thx.kjh

## 2019-01-10 RX ORDER — PROCHLORPERAZINE MALEATE 5 MG/1
TABLET ORAL
Qty: 60 TABLET | Refills: 2 | Status: SHIPPED | OUTPATIENT
Start: 2019-01-10 | End: 2019-03-15 | Stop reason: SDUPTHER

## 2019-01-15 ENCOUNTER — OFFICE VISIT (OUTPATIENT)
Dept: INTERNAL MEDICINE | Age: 74
End: 2019-01-15

## 2019-01-15 VITALS
SYSTOLIC BLOOD PRESSURE: 114 MMHG | WEIGHT: 111 LBS | BODY MASS INDEX: 17.84 KG/M2 | HEIGHT: 66 IN | TEMPERATURE: 97.8 F | OXYGEN SATURATION: 98 % | DIASTOLIC BLOOD PRESSURE: 72 MMHG | HEART RATE: 75 BPM

## 2019-01-15 DIAGNOSIS — Z23 ENCOUNTER FOR IMMUNIZATION: ICD-10-CM

## 2019-01-15 DIAGNOSIS — R53.83 OTHER FATIGUE: Primary | ICD-10-CM

## 2019-01-15 LAB
ALBUMIN SERPL-MCNC: 4.4 G/DL (ref 3.5–5.2)
ALBUMIN/GLOB SERPL: 1.6 G/DL
ALP SERPL-CCNC: 112 U/L (ref 39–117)
ALT SERPL-CCNC: 13 U/L (ref 1–33)
AST SERPL-CCNC: 12 U/L (ref 1–32)
BASOPHILS # BLD AUTO: 0.06 10*3/MM3 (ref 0–0.2)
BASOPHILS NFR BLD AUTO: 0.7 % (ref 0–1.5)
BILIRUB SERPL-MCNC: 0.3 MG/DL (ref 0.1–1.2)
BUN SERPL-MCNC: 13 MG/DL (ref 8–23)
BUN/CREAT SERPL: 15.3 (ref 7–25)
CALCIUM SERPL-MCNC: 10.4 MG/DL (ref 8.6–10.5)
CHLORIDE SERPL-SCNC: 103 MMOL/L (ref 98–107)
CO2 SERPL-SCNC: 28.3 MMOL/L (ref 22–29)
CREAT SERPL-MCNC: 0.85 MG/DL (ref 0.57–1)
EOSINOPHIL # BLD AUTO: 0.08 10*3/MM3 (ref 0–0.7)
EOSINOPHIL NFR BLD AUTO: 0.9 % (ref 0.3–6.2)
ERYTHROCYTE [DISTWIDTH] IN BLOOD BY AUTOMATED COUNT: 13.4 % (ref 11.7–13)
GLOBULIN SER CALC-MCNC: 2.7 GM/DL
GLUCOSE SERPL-MCNC: 106 MG/DL (ref 65–99)
HCT VFR BLD AUTO: 45.9 % (ref 35.6–45.5)
HGB BLD-MCNC: 15.2 G/DL (ref 11.9–15.5)
IMM GRANULOCYTES # BLD AUTO: 0.02 10*3/MM3 (ref 0–0.03)
IMM GRANULOCYTES NFR BLD AUTO: 0.2 % (ref 0–0.5)
LYMPHOCYTES # BLD AUTO: 1.99 10*3/MM3 (ref 0.9–4.8)
LYMPHOCYTES NFR BLD AUTO: 21.7 % (ref 19.6–45.3)
MCH RBC QN AUTO: 33.5 PG (ref 26.9–32)
MCHC RBC AUTO-ENTMCNC: 33.1 G/DL (ref 32.4–36.3)
MCV RBC AUTO: 101.1 FL (ref 80.5–98.2)
MONOCYTES # BLD AUTO: 0.77 10*3/MM3 (ref 0.2–1.2)
MONOCYTES NFR BLD AUTO: 8.4 % (ref 5–12)
NEUTROPHILS # BLD AUTO: 6.29 10*3/MM3 (ref 1.9–8.1)
NEUTROPHILS NFR BLD AUTO: 68.3 % (ref 42.7–76)
PLATELET # BLD AUTO: 415 10*3/MM3 (ref 140–500)
POTASSIUM SERPL-SCNC: 4.5 MMOL/L (ref 3.5–5.2)
PROT SERPL-MCNC: 7.1 G/DL (ref 6–8.5)
RBC # BLD AUTO: 4.54 10*6/MM3 (ref 3.9–5.2)
SODIUM SERPL-SCNC: 143 MMOL/L (ref 136–145)
T4 FREE SERPL-MCNC: 0.91 NG/DL (ref 0.93–1.7)
TSH SERPL DL<=0.005 MIU/L-ACNC: 2.36 MIU/ML (ref 0.27–4.2)
WBC # BLD AUTO: 9.19 10*3/MM3 (ref 4.5–10.7)

## 2019-01-15 PROCEDURE — G0008 ADMIN INFLUENZA VIRUS VAC: HCPCS | Performed by: INTERNAL MEDICINE

## 2019-01-15 PROCEDURE — 90471 IMMUNIZATION ADMIN: CPT | Performed by: INTERNAL MEDICINE

## 2019-01-15 PROCEDURE — 90674 CCIIV4 VAC NO PRSV 0.5 ML IM: CPT | Performed by: INTERNAL MEDICINE

## 2019-01-15 PROCEDURE — 99213 OFFICE O/P EST LOW 20 MIN: CPT | Performed by: INTERNAL MEDICINE

## 2019-01-15 PROCEDURE — 90715 TDAP VACCINE 7 YRS/> IM: CPT | Performed by: INTERNAL MEDICINE

## 2019-01-15 NOTE — PROGRESS NOTES
"[facility]  MD Harper OLEARYhunter Thomson / 73 y.o. / female  01/15/2019      ASSESSMENT & PLAN:    Problem List Items Addressed This Visit        Unprioritized    Fatigue - Primary    Relevant Orders    CBC & Differential    Comprehensive Metabolic Panel    TSH    T4, free      Other Visit Diagnoses     Encounter for immunization        Relevant Orders    Fluzone High Dose =>65Years    Tdap Vaccine Greater Than or Equal To 6yo IM        Orders Placed This Encounter   Procedures   • Fluzone High Dose =>65Years   • Tdap Vaccine Greater Than or Equal To 6yo IM   • Comprehensive Metabolic Panel   • TSH   • T4, free   • CBC & Differential       Summary/Discussion:    Number-one recent onset of fatigue coincident with loss of packed.  At this point, I see no evidence clinically of infection or other internal organ derangement.  Will check laboratory to rule out electrolytes abnormalities anemia or thyroid dysfunction.  I see no clinical evidence to order imaging studies at this time.  If laboratory evaluation is unremarkable, I would refer the patient back to her psychiatrist to adjust medication for possibility of depression exacerbation.  Patient is comfortable with this approach.    #2 immunization update will provide flu shot and Adacel today.    Patient will see Dr. Vega after I leave the area.      Return Dr Vega.    ____________________________________________________________________    VITALS    Visit Vitals  /72   Pulse 75   Temp 97.8 °F (36.6 °C)   Ht 167.6 cm (65.98\")   Wt 50.3 kg (111 lb)   SpO2 98%   BMI 17.92 kg/m²       BP Readings from Last 3 Encounters:   01/15/19 114/72   05/02/18 132/86   03/01/18 132/88     Wt Readings from Last 3 Encounters:   01/15/19 50.3 kg (111 lb)   06/27/18 51.3 kg (113 lb)   05/02/18 50.6 kg (111 lb 9.6 oz)      Body mass index is 17.92 kg/m².    CC:  Main reason(s) for today's visit: Fatigue      HPI:     Patient presents today because of rather " abrupt onset of acute fatigue.  Over the past 2 weeks she noted this problem to occur.  Typically she will sleep 7 & 1/2 hours per evening, exercise daily walking 3 miles in the park.  Recently she believes that she was coming down with the flu but developed no additional symptoms other than the fatigue.  This is significantly to the point of requiring a 3 hour nap during the day after reading the paper.  Patient was recently started on treatment Trintellix .  Review of adverse effects failed to show any association with current symptoms.  Diet, is a vegetarian diet.    Patient Care Team:  Duy Linn MD as PCP - General (Internal Medicine)  Thad Brumfield MD as Consulting Physician (Gastroenterology)  Adeola Kaufman MD as Consulting Physician (Gynecology)  Ashlee Hampton MD (Psychiatry)  ____________________________________________________________________    REVIEW OF SYSTEMS    Review of Systems   Constitutional: Negative for appetite change, chills, diaphoresis and fever.   HENT: Negative for ear discharge, rhinorrhea and sore throat.    Respiratory: Negative for chest tightness and shortness of breath.    Cardiovascular: Negative for chest pain.   Gastrointestinal: Negative for diarrhea, nausea and vomiting.   Genitourinary: Negative for dysuria, frequency and hematuria.   Skin: Negative for rash.   Neurological: Negative for headaches.   Hematological: Negative for adenopathy.   Psychiatric/Behavioral: Positive for dysphoric mood.        Patient recently lost her dog within the past 2 weeks.         PHYSICAL EXAMINATION    Physical Exam   Constitutional: She is oriented to person, place, and time. She appears well-developed and well-nourished. No distress.   HENT:   Nose: Right sinus exhibits no maxillary sinus tenderness and no frontal sinus tenderness. Left sinus exhibits no maxillary sinus tenderness and no frontal sinus tenderness.   Mouth/Throat: No posterior oropharyngeal edema or posterior  oropharyngeal erythema.   Eyes: Conjunctivae are normal.   Cardiovascular: Normal rate, regular rhythm, normal heart sounds and intact distal pulses. Exam reveals no gallop and no friction rub.   No murmur heard.  Pulmonary/Chest: Effort normal and breath sounds normal. She has no wheezes. She has no rales.   Musculoskeletal: She exhibits no edema.   Neurological: She is alert and oriented to person, place, and time.   Skin: Skin is warm and dry. No rash noted.   Psychiatric: She has a normal mood and affect. Her behavior is normal. Judgment and thought content normal.   Nursing note and vitals reviewed.        REVIEWED DATA:    Labs:     Lab Results   Component Value Date     03/01/2018    K 4.5 03/01/2018    AST 15 03/01/2018    ALT 15 03/01/2018    BUN 16 03/01/2018    CREATININE 0.80 07/19/2018    CREATININE 0.85 03/01/2018    CREATININE 0.83 07/13/2017    EGFRIFNONA 66 03/01/2018    EGFRIFAFRI 80 03/01/2018       No results found for: HGBA1C, GLUCOSE, MICROALBUR    No results found for: LDL, HDL, TRIG, CHOLHDLRATIO    Lab Results   Component Value Date    TSH 1.970 07/13/2017    FREET4 1.04 07/13/2017       Lab Results   Component Value Date    WBC 9.51 03/01/2018    HGB 14.2 03/01/2018    HGB 15.0 11/02/2017    HGB 14.7 07/13/2017     03/01/2018       No results found for: PSA      Imaging:         Medical Tests:         Summary of old records / correspondence / consultant report:         Request outside records:         ALLERGIES  Allergies   Allergen Reactions   • Lexapro [Escitalopram Oxalate] GI Intolerance     nausea   • Prozac [Fluoxetine Hcl] Nausea Only        MEDICATIONS  Current Outpatient Medications   Medication Sig Dispense Refill   • Calcium Polycarbophil (FIBER-CAPS PO) Take 2 capsules by mouth Daily.     • lamoTRIgine (LaMICtal) 100 MG tablet Take 1.5 tablets by mouth 2 (Two) Times a Day.     • LORazepam (ATIVAN) 0.5 MG tablet Take 0.5 mg by mouth 2 (Two) Times a Day As Needed.  2    • prochlorperazine (COMPAZINE) 5 MG tablet TAKE 1 TABLET BY MOUTH EVERY 6 (SIX) HOURS AS NEEDED FOR NAUSEA OR VOMITING. 60 tablet 2   • Vortioxetine HBr (TRINTELLIX) 20 MG tablet Take 1 tablet by mouth Daily.     • Vortioxetine HBr (TRINTELLIX) 10 MG tablet Take  by mouth.       No current facility-administered medications for this visit.        PFSH:     The following portions of the patient's history were reviewed and updated as appropriate: Allergies / Current Medications / Past Medical History / Surgical History / Social History / Family History    PROBLEM LIST   Patient Active Problem List   Diagnosis   • Cervical myelopathy (CMS/HCC)   • Cervicogenic headache   • Atypical migraine   • Chronic nausea   • Smoking 1/2 pack a day or less   • Muscle spasm   • Dyspepsia   • Tubular adenoma of colon   • Fatigue   • Bipolar disorder (CMS/HCC)   • Chronic pain of right knee       PAST MEDICAL HISTORY  Past Medical History:   Diagnosis Date   • Bipolar affect, depressed (CMS/HCC)    • Cervical myelopathy (CMS/HCC)    • Depression    • Gastritis    • GERD (gastroesophageal reflux disease)    • Migraine headache    • Pancreatic cyst     4 cm       SURGICAL HISTORY  Past Surgical History:   Procedure Laterality Date   • COLONOSCOPY  2013   • COLONOSCOPY N/A 10/31/2016    polyps, tics, IHtubular adenoma w/low grade dysplasia x 2, hyperplastic polyp x3   • ENDOSCOPY  2013   • ENDOSCOPY N/A 10/31/2016    erythematous mucosa in stomach   • TONSILLECTOMY         SOCIAL HISTORY  Social History     Socioeconomic History   • Marital status: Single     Spouse name: Not on file   • Number of children: 0   • Years of education: Not on file   • Highest education level: Not on file   Occupational History   • Occupation:    Tobacco Use   • Smoking status: Former Smoker     Packs/day: 0.25     Years: 12.00     Pack years: 3.00     Types: Cigarettes     Last attempt to quit:      Years since quittin.0   •  Smokeless tobacco: Never Used   • Tobacco comment: We discussed nicotine patch, gum, hypnosis.   Substance and Sexual Activity   • Alcohol use: Yes     Comment: social   • Drug use: Defer   • Sexual activity: Defer       FAMILY HISTORY  Family History   Problem Relation Age of Onset   • Stroke Mother    • Cancer Father    • Cancer Maternal Grandmother    • Stroke Maternal Grandmother        Health Maintenance Due   Topic   • TDAP/TD VACCINES (1 - Tdap)   • PNEUMOCOCCAL VACCINES (65+ LOW/MEDIUM RISK) (1 of 2 - PCV13)   • ZOSTER VACCINE (2 of 3)   • HEPATITIS C SCREENING    • MEDICARE ANNUAL WELLNESS        Overdue health maintenance interventions  reviewed with patient.    Dictated utilizing Dragon voice recognition software

## 2019-01-16 NOTE — PROGRESS NOTES
Laboratory results are acceptable.  I would discuss the symptoms with your psychiatrist as we planned yesterday.

## 2019-01-24 ENCOUNTER — OFFICE VISIT (OUTPATIENT)
Dept: GASTROENTEROLOGY | Facility: CLINIC | Age: 74
End: 2019-01-24

## 2019-01-24 VITALS
BODY MASS INDEX: 17.78 KG/M2 | TEMPERATURE: 98.2 F | DIASTOLIC BLOOD PRESSURE: 70 MMHG | WEIGHT: 110.6 LBS | SYSTOLIC BLOOD PRESSURE: 122 MMHG | HEIGHT: 66 IN

## 2019-01-24 DIAGNOSIS — K59.00 CONSTIPATION, UNSPECIFIED CONSTIPATION TYPE: ICD-10-CM

## 2019-01-24 DIAGNOSIS — K86.2 PANCREATIC CYST: Primary | ICD-10-CM

## 2019-01-24 DIAGNOSIS — K63.5 POLYP OF COLON, UNSPECIFIED PART OF COLON, UNSPECIFIED TYPE: ICD-10-CM

## 2019-01-24 PROBLEM — F31.9 BIPOLAR DISORDER: Chronic | Status: ACTIVE | Noted: 2017-08-01

## 2019-01-24 PROCEDURE — 99214 OFFICE O/P EST MOD 30 MIN: CPT | Performed by: INTERNAL MEDICINE

## 2019-01-24 NOTE — PROGRESS NOTES
Chief Complaint   Patient presents with   • Heartburn     improved       History of Present Illness: 72 yo female with AM nausea and chronic constipation. We reviewed the results of her last EGD and colonoscopy done in 10/16. NO abdominal or chest pain. Weight stable. She walks 3 miles/day. No rectal bleeding or melena. Rare heartburn. No dysphagia. Rare Aleve. Dexilant didn't help the nausea.     Past Medical History:   Diagnosis Date   • Bipolar affect, depressed (CMS/HCC)    • Cervical myelopathy (CMS/HCC)    • Depression    • Gastritis    • GERD (gastroesophageal reflux disease)    • Migraine headache    • Pancreatic cyst     4 cm       Past Surgical History:   Procedure Laterality Date   • COLONOSCOPY  05/2013   • COLONOSCOPY N/A 10/31/2016    polyps, tics, IHtubular adenoma w/low grade dysplasia x 2, hyperplastic polyp x3   • ENDOSCOPY  11/2013   • ENDOSCOPY N/A 10/31/2016    erythematous mucosa in stomach   • TONSILLECTOMY           Current Outpatient Medications:   •  Calcium Polycarbophil (FIBER-CAPS PO), Take 2 capsules by mouth Daily., Disp: , Rfl:   •  lamoTRIgine (LaMICtal) 100 MG tablet, Take 1.5 tablets by mouth 2 (Two) Times a Day., Disp: , Rfl:   •  LORazepam (ATIVAN) 0.5 MG tablet, Take 0.5 mg by mouth 2 (Two) Times a Day As Needed., Disp: , Rfl: 2  •  prochlorperazine (COMPAZINE) 5 MG tablet, TAKE 1 TABLET BY MOUTH EVERY 6 (SIX) HOURS AS NEEDED FOR NAUSEA OR VOMITING., Disp: 60 tablet, Rfl: 2  •  Vortioxetine HBr (TRINTELLIX) 10 MG tablet, Take  by mouth., Disp: , Rfl:   •  Vortioxetine HBr (TRINTELLIX) 20 MG tablet, Take 1 tablet by mouth Daily., Disp: , Rfl:     Allergies   Allergen Reactions   • Lexapro [Escitalopram Oxalate] GI Intolerance     nausea   • Prozac [Fluoxetine Hcl] Nausea Only       Family History   Problem Relation Age of Onset   • Stroke Mother    • Cancer Father    • Cancer Maternal Grandmother    • Stroke Maternal Grandmother        Social History     Socioeconomic History   •  Marital status: Single     Spouse name: Not on file   • Number of children: 0   • Years of education: Not on file   • Highest education level: Not on file   Social Needs   • Financial resource strain: Not on file   • Food insecurity - worry: Not on file   • Food insecurity - inability: Not on file   • Transportation needs - medical: Not on file   • Transportation needs - non-medical: Not on file   Occupational History   • Occupation:    Tobacco Use   • Smoking status: Former Smoker     Packs/day: 0.25     Years: 12.00     Pack years: 3.00     Types: Cigarettes     Last attempt to quit: 2010     Years since quittin.0   • Smokeless tobacco: Never Used   • Tobacco comment: We discussed nicotine patch, gum, hypnosis.   Substance and Sexual Activity   • Alcohol use: Yes     Comment: social   • Drug use: Defer   • Sexual activity: Defer   Other Topics Concern   • Not on file   Social History Narrative   • Not on file       Review of Systems   Gastrointestinal: Positive for nausea.   All other systems reviewed and are negative.      Vitals:    19 0834   BP: 122/70   Temp: 98.2 °F (36.8 °C)       Physical Exam   Constitutional: She is oriented to person, place, and time. She appears well-developed and well-nourished. No distress.   HENT:   Head: Normocephalic and atraumatic. Hair is normal.   Right Ear: Hearing, tympanic membrane, external ear and ear canal normal. No drainage. No decreased hearing is noted.   Left Ear: Hearing, tympanic membrane, external ear and ear canal normal. No decreased hearing is noted.   Nose: No nasal deformity.   Mouth/Throat: Oropharynx is clear and moist.   Eyes: Conjunctivae, EOM and lids are normal. Pupils are equal, round, and reactive to light. Right eye exhibits no discharge. Left eye exhibits no discharge.   Neck: Normal range of motion. Neck supple. No JVD present. No tracheal deviation present. No thyromegaly present.   Cardiovascular: Normal rate, regular rhythm,  normal heart sounds, intact distal pulses and normal pulses. Exam reveals no gallop and no friction rub.   No murmur heard.  Pulmonary/Chest: Effort normal and breath sounds normal. No respiratory distress. She has no wheezes. She has no rales. She exhibits no tenderness.   Abdominal: Soft. Bowel sounds are normal. She exhibits no distension and no mass. There is no tenderness. There is no rebound and no guarding. No hernia.   Genitourinary: Rectal exam shows guaiac negative stool.   Musculoskeletal: Normal range of motion. She exhibits no edema, tenderness or deformity.   Lymphadenopathy:     She has no cervical adenopathy.   Neurological: She is alert and oriented to person, place, and time. She has normal reflexes. She displays normal reflexes. No cranial nerve deficit. She exhibits normal muscle tone. Coordination normal.   Skin: Skin is warm and dry. No rash noted. She is not diaphoretic. No erythema.   Psychiatric: She has a normal mood and affect. Her behavior is normal. Judgment and thought content normal.   Vitals reviewed.      Lona was seen today for heartburn.    Diagnoses and all orders for this visit:    Pancreatic cyst  -     MRI abdomen w contrast mrcp; Future    Polyp of colon, unspecified part of colon, unspecified type    Constipation, unspecified constipation type      Assessment:  1)   2) GERD  3) Small lesion in the uncinate process of the pancreas. She is due for a repeat MRI of the abdomen in 7/18.  4) h/o colonic adenomas.  5) Nausea  6) Constipation.     Recommendations:  1) Increase the fiber pills to 2 BID.  2) In 7/19 repeat MRCP.   3) Repeat colonoscopy + EGD in 10/19.   4) f/u 6 mos.    Return in about 6 months (around 7/24/2019).    Thad Brumfield MD  1/24/2019

## 2019-03-15 RX ORDER — PROCHLORPERAZINE MALEATE 5 MG/1
TABLET ORAL
Qty: 60 TABLET | Refills: 2 | Status: SHIPPED | OUTPATIENT
Start: 2019-03-15 | End: 2019-07-26 | Stop reason: SDUPTHER

## 2019-07-17 ENCOUNTER — HOSPITAL ENCOUNTER (OUTPATIENT)
Dept: MRI IMAGING | Facility: HOSPITAL | Age: 74
Discharge: HOME | End: 2019-07-17
Admitting: INTERNAL MEDICINE

## 2019-07-17 DIAGNOSIS — K86.2 PANCREATIC CYST: ICD-10-CM

## 2019-07-17 PROCEDURE — 0 GADOBENATE DIMEGLUMINE 529 MG/ML SOLUTION: Performed by: INTERNAL MEDICINE

## 2019-07-17 PROCEDURE — 74183 MRI ABD W/O CNTR FLWD CNTR: CPT

## 2019-07-17 PROCEDURE — A9577 INJ MULTIHANCE: HCPCS | Performed by: INTERNAL MEDICINE

## 2019-07-17 PROCEDURE — 82565 ASSAY OF CREATININE: CPT

## 2019-07-17 RX ADMIN — GADOBENATE DIMEGLUMINE 10 ML: 529 INJECTION, SOLUTION INTRAVENOUS at 15:43

## 2019-07-18 LAB — CREAT BLDA-MCNC: 0.8 MG/DL (ref 0.6–1.3)

## 2019-07-22 ENCOUNTER — TELEPHONE (OUTPATIENT)
Dept: GASTROENTEROLOGY | Facility: CLINIC | Age: 74
End: 2019-07-22

## 2019-07-22 NOTE — TELEPHONE ENCOUNTER
Call to pt. Advise per Dr Brumfield that cyst in the pancreas is stable.  Can discuss in more detail when seen in office.    Verb understanding - has appt 7/25.

## 2019-07-22 NOTE — TELEPHONE ENCOUNTER
----- Message from Thad Brumfield MD sent at 7/20/2019  5:11 PM EDT -----  TEll her that the cyst in the pancreas is stable. We can discuss in more detail when I see you in the office.. Thx. kjh

## 2019-07-26 RX ORDER — PROCHLORPERAZINE MALEATE 5 MG/1
TABLET ORAL
Qty: 60 TABLET | Refills: 2 | Status: SHIPPED | OUTPATIENT
Start: 2019-07-26 | End: 2019-09-30 | Stop reason: SDUPTHER

## 2019-09-30 RX ORDER — PROCHLORPERAZINE MALEATE 5 MG/1
TABLET ORAL
Qty: 60 TABLET | Refills: 2 | Status: SHIPPED | OUTPATIENT
Start: 2019-09-30 | End: 2020-01-27

## 2019-11-04 ENCOUNTER — OFFICE VISIT (OUTPATIENT)
Dept: GASTROENTEROLOGY | Facility: CLINIC | Age: 74
End: 2019-11-04

## 2019-11-04 VITALS
TEMPERATURE: 98.3 F | SYSTOLIC BLOOD PRESSURE: 132 MMHG | WEIGHT: 118.6 LBS | BODY MASS INDEX: 19.76 KG/M2 | DIASTOLIC BLOOD PRESSURE: 88 MMHG | HEIGHT: 65 IN

## 2019-11-04 DIAGNOSIS — K59.00 CONSTIPATION, UNSPECIFIED CONSTIPATION TYPE: ICD-10-CM

## 2019-11-04 DIAGNOSIS — K86.2 PANCREATIC CYST: ICD-10-CM

## 2019-11-04 DIAGNOSIS — R11.0 NAUSEA: ICD-10-CM

## 2019-11-04 DIAGNOSIS — D37.9 NEOPLASM OF UNCERTAIN BEHAVIOR OF DIGESTIVE ORGAN, UNSPECIFIED: ICD-10-CM

## 2019-11-04 DIAGNOSIS — K86.2 PANCREAS CYST: Primary | ICD-10-CM

## 2019-11-04 DIAGNOSIS — K63.5 POLYP OF COLON, UNSPECIFIED PART OF COLON, UNSPECIFIED TYPE: ICD-10-CM

## 2019-11-04 DIAGNOSIS — K21.9 GASTROESOPHAGEAL REFLUX DISEASE, ESOPHAGITIS PRESENCE NOT SPECIFIED: ICD-10-CM

## 2019-11-04 LAB
ALBUMIN SERPL-MCNC: 4.8 G/DL (ref 3.5–5.2)
ALBUMIN/GLOB SERPL: 2.5 G/DL
ALP SERPL-CCNC: 129 U/L (ref 39–117)
ALT SERPL-CCNC: 16 U/L (ref 1–33)
AMYLASE SERPL-CCNC: 59 U/L (ref 28–100)
AST SERPL-CCNC: 16 U/L (ref 1–32)
BASOPHILS # BLD AUTO: 0.06 10*3/MM3 (ref 0–0.2)
BASOPHILS NFR BLD AUTO: 1 % (ref 0–1.5)
BILIRUB SERPL-MCNC: 0.3 MG/DL (ref 0.2–1.2)
BUN SERPL-MCNC: 15 MG/DL (ref 8–23)
BUN/CREAT SERPL: 17.9 (ref 7–25)
CALCIUM SERPL-MCNC: 9.7 MG/DL (ref 8.6–10.5)
CHLORIDE SERPL-SCNC: 102 MMOL/L (ref 98–107)
CO2 SERPL-SCNC: 26.3 MMOL/L (ref 22–29)
CREAT SERPL-MCNC: 0.84 MG/DL (ref 0.57–1)
EOSINOPHIL # BLD AUTO: 0.04 10*3/MM3 (ref 0–0.4)
EOSINOPHIL NFR BLD AUTO: 0.7 % (ref 0.3–6.2)
ERYTHROCYTE [DISTWIDTH] IN BLOOD BY AUTOMATED COUNT: 12.3 % (ref 12.3–15.4)
GLOBULIN SER CALC-MCNC: 1.9 GM/DL
GLUCOSE SERPL-MCNC: 106 MG/DL (ref 65–99)
HCT VFR BLD AUTO: 43 % (ref 34–46.6)
HGB BLD-MCNC: 15 G/DL (ref 12–15.9)
IMM GRANULOCYTES # BLD AUTO: 0.02 10*3/MM3 (ref 0–0.05)
IMM GRANULOCYTES NFR BLD AUTO: 0.3 % (ref 0–0.5)
LIPASE SERPL-CCNC: 25 U/L (ref 13–60)
LYMPHOCYTES # BLD AUTO: 1.47 10*3/MM3 (ref 0.7–3.1)
LYMPHOCYTES NFR BLD AUTO: 25.3 % (ref 19.6–45.3)
MCH RBC QN AUTO: 33.2 PG (ref 26.6–33)
MCHC RBC AUTO-ENTMCNC: 34.9 G/DL (ref 31.5–35.7)
MCV RBC AUTO: 95.1 FL (ref 79–97)
MONOCYTES # BLD AUTO: 0.45 10*3/MM3 (ref 0.1–0.9)
MONOCYTES NFR BLD AUTO: 7.8 % (ref 5–12)
NEUTROPHILS # BLD AUTO: 3.76 10*3/MM3 (ref 1.7–7)
NEUTROPHILS NFR BLD AUTO: 64.9 % (ref 42.7–76)
NRBC BLD AUTO-RTO: 0 /100 WBC (ref 0–0.2)
PLATELET # BLD AUTO: 374 10*3/MM3 (ref 140–450)
POTASSIUM SERPL-SCNC: 4.7 MMOL/L (ref 3.5–5.2)
PROT SERPL-MCNC: 6.7 G/DL (ref 6–8.5)
RBC # BLD AUTO: 4.52 10*6/MM3 (ref 3.77–5.28)
SODIUM SERPL-SCNC: 143 MMOL/L (ref 136–145)
WBC # BLD AUTO: 5.8 10*3/MM3 (ref 3.4–10.8)

## 2019-11-04 PROCEDURE — 99214 OFFICE O/P EST MOD 30 MIN: CPT | Performed by: INTERNAL MEDICINE

## 2019-11-05 ENCOUNTER — TELEPHONE (OUTPATIENT)
Dept: GASTROENTEROLOGY | Facility: CLINIC | Age: 74
End: 2019-11-05

## 2019-11-05 LAB — CANCER AG19-9 SERPL-ACNC: 10 U/ML (ref 0–35)

## 2019-11-05 NOTE — TELEPHONE ENCOUNTER
Called pt and advised per Dr Brumfield that her labs look ok.  The pancreatic cancer tumor marker (CA-19-9) came back normal at 10 which is good. Pt verb understanding.     Labs sent to Dr Vega thru [facility].

## 2019-11-05 NOTE — PROGRESS NOTES
11/05/19  Tell her that her labs look OK. The pancreatic cancer tumor marker (CA 19-9) came back normal at 10, which is good. FAX to her PCP.   Jorge stein

## 2019-11-05 NOTE — TELEPHONE ENCOUNTER
----- Message from Thad Brumfield MD sent at 11/5/2019  2:09 PM EST -----  11/05/19  Tell her that her labs look OK. The pancreatic cancer tumor marker (CA 19-9) came back normal at 10, which is good. FAX to her PCP.   Jorge stein

## 2019-12-09 ENCOUNTER — ANESTHESIA (OUTPATIENT)
Dept: GASTROENTEROLOGY | Facility: HOSPITAL | Age: 74
End: 2019-12-09

## 2019-12-09 ENCOUNTER — HOSPITAL ENCOUNTER (OUTPATIENT)
Facility: HOSPITAL | Age: 74
Setting detail: HOSPITAL OUTPATIENT SURGERY
Discharge: HOME | End: 2019-12-09
Attending: INTERNAL MEDICINE | Admitting: INTERNAL MEDICINE

## 2019-12-09 ENCOUNTER — ANESTHESIA EVENT (OUTPATIENT)
Dept: GASTROENTEROLOGY | Facility: HOSPITAL | Age: 74
End: 2019-12-09

## 2019-12-09 VITALS
TEMPERATURE: 97.6 F | RESPIRATION RATE: 16 BRPM | DIASTOLIC BLOOD PRESSURE: 80 MMHG | OXYGEN SATURATION: 100 % | BODY MASS INDEX: 19.2 KG/M2 | HEIGHT: 65 IN | HEART RATE: 64 BPM | WEIGHT: 115.25 LBS | SYSTOLIC BLOOD PRESSURE: 162 MMHG

## 2019-12-09 DIAGNOSIS — K63.5 POLYP OF COLON, UNSPECIFIED PART OF COLON, UNSPECIFIED TYPE: ICD-10-CM

## 2019-12-09 DIAGNOSIS — R11.0 NAUSEA: ICD-10-CM

## 2019-12-09 DIAGNOSIS — R11.0 NAUSEA: Primary | ICD-10-CM

## 2019-12-09 DIAGNOSIS — K59.00 CONSTIPATION, UNSPECIFIED CONSTIPATION TYPE: ICD-10-CM

## 2019-12-09 DIAGNOSIS — K86.2 PANCREAS CYST: ICD-10-CM

## 2019-12-09 DIAGNOSIS — D37.9 NEOPLASM OF UNCERTAIN BEHAVIOR OF DIGESTIVE ORGAN, UNSPECIFIED: ICD-10-CM

## 2019-12-09 DIAGNOSIS — K86.2 PANCREATIC CYST: ICD-10-CM

## 2019-12-09 DIAGNOSIS — K21.9 GASTROESOPHAGEAL REFLUX DISEASE, ESOPHAGITIS PRESENCE NOT SPECIFIED: ICD-10-CM

## 2019-12-09 PROCEDURE — 87081 CULTURE SCREEN ONLY: CPT | Performed by: INTERNAL MEDICINE

## 2019-12-09 PROCEDURE — 45380 COLONOSCOPY AND BIOPSY: CPT | Performed by: INTERNAL MEDICINE

## 2019-12-09 PROCEDURE — 43239 EGD BIOPSY SINGLE/MULTIPLE: CPT | Performed by: INTERNAL MEDICINE

## 2019-12-09 PROCEDURE — 25010000002 PROPOFOL 10 MG/ML EMULSION: Performed by: NURSE ANESTHETIST, CERTIFIED REGISTERED

## 2019-12-09 PROCEDURE — 88305 TISSUE EXAM BY PATHOLOGIST: CPT | Performed by: INTERNAL MEDICINE

## 2019-12-09 PROCEDURE — S0260 H&P FOR SURGERY: HCPCS | Performed by: INTERNAL MEDICINE

## 2019-12-09 RX ORDER — LIDOCAINE HYDROCHLORIDE 20 MG/ML
INJECTION, SOLUTION INFILTRATION; PERINEURAL AS NEEDED
Status: DISCONTINUED | OUTPATIENT
Start: 2019-12-09 | End: 2019-12-09 | Stop reason: SURG

## 2019-12-09 RX ORDER — PROPOFOL 10 MG/ML
VIAL (ML) INTRAVENOUS AS NEEDED
Status: DISCONTINUED | OUTPATIENT
Start: 2019-12-09 | End: 2019-12-09 | Stop reason: SURG

## 2019-12-09 RX ORDER — SODIUM CHLORIDE, SODIUM LACTATE, POTASSIUM CHLORIDE, CALCIUM CHLORIDE 600; 310; 30; 20 MG/100ML; MG/100ML; MG/100ML; MG/100ML
30 INJECTION, SOLUTION INTRAVENOUS CONTINUOUS PRN
Status: DISCONTINUED | OUTPATIENT
Start: 2019-12-09 | End: 2019-12-09 | Stop reason: HOSPADM

## 2019-12-09 RX ORDER — PROPOFOL 10 MG/ML
VIAL (ML) INTRAVENOUS CONTINUOUS PRN
Status: DISCONTINUED | OUTPATIENT
Start: 2019-12-09 | End: 2019-12-09 | Stop reason: SURG

## 2019-12-09 RX ADMIN — PROPOFOL 50 MG: 10 INJECTION, EMULSION INTRAVENOUS at 14:06

## 2019-12-09 RX ADMIN — PROPOFOL 50 MG: 10 INJECTION, EMULSION INTRAVENOUS at 14:08

## 2019-12-09 RX ADMIN — PROPOFOL 300 MCG/KG/MIN: 10 INJECTION, EMULSION INTRAVENOUS at 14:06

## 2019-12-09 RX ADMIN — LIDOCAINE HYDROCHLORIDE 40 MG: 20 INJECTION, SOLUTION INFILTRATION; PERINEURAL at 14:06

## 2019-12-09 RX ADMIN — SODIUM CHLORIDE, POTASSIUM CHLORIDE, SODIUM LACTATE AND CALCIUM CHLORIDE 30 ML/HR: 600; 310; 30; 20 INJECTION, SOLUTION INTRAVENOUS at 14:01

## 2019-12-09 NOTE — NURSING NOTE
Contacted sister Suyapa, [phone], to  patient after her procedure. Stated she will be on her way. Delay in dc home.

## 2019-12-09 NOTE — ANESTHESIA POSTPROCEDURE EVALUATION
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, unspecified      Pancreatic cyst      Polyp of colon, unspecified part of colon, unspecified type      Constipation, unspecified constipation type      Gastroesophageal reflux disease, esophagitis presence not specified      (Pancreas cyst [K86.2])      (Nausea [R11.0])      (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, unspecified [D37.9])      (Pancreatic cyst [K86.2])      (Polyp of colon, unspecified part of colon, unspecified type [K63.5])      (Constipation, unspecified constipation type [K59.00])      (Gastroesophageal reflux disease, esophagitis presence not specified [K21.9])    Surgeon:  Thad Brumfield MD Provider:  Joaquín Lynn MD    Anesthesia Type:  MAC ASA Status:  2          Anesthesia Type: MAC    Vitals  Vitals Value Taken Time   /80 12/9/2019  3:25 PM   Temp     Pulse 64 12/9/2019  3:25 PM   Resp 16 12/9/2019  3:25 PM   SpO2 100 % 12/9/2019  3:25 PM           Post Anesthesia Care and Evaluation    Patient location during evaluation: bedside  Patient participation: complete - patient participated  Level of consciousness: awake and alert  Pain management: adequate  Airway patency: patent  Anesthetic complications: No anesthetic complications    Cardiovascular status: acceptable  Respiratory status: acceptable  Hydration status: acceptable    Comments: /80 (BP Location: Left arm, Patient Position: Lying)   Pulse 64   Temp 36.4 °C (97.6 °F) (Oral)   Resp 16   Ht 165.1 cm (65\")   Wt 52.3 kg (115 lb 4 oz)   SpO2 100%   BMI 19.18 kg/m²       "

## 2019-12-09 NOTE — ANESTHESIA PREPROCEDURE EVALUATION
Anesthesia Evaluation     Patient summary reviewed and Nursing notes reviewed   NPO Solid Status: > 8 hours  NPO Liquid Status: > 8 hours           Airway   Mallampati: II  TM distance: >3 FB  Neck ROM: full  no difficulty expected  Dental - normal exam     Pulmonary - normal exam    breath sounds clear to auscultation  Cardiovascular - normal exam  Exercise tolerance: good (4-7 METS)    Rhythm: regular  Rate: normal        Neuro/Psych  (+) headaches, psychiatric history Depression and Bipolar,     GI/Hepatic/Renal/Endo    (+)  GERD,      Musculoskeletal     Abdominal  - normal exam   Substance History      OB/GYN          Other                          Anesthesia Plan    ASA 2     MAC     intravenous induction     Anesthetic plan, all risks, benefits, and alternatives have been provided, discussed and informed consent has been obtained with: patient.  Use of blood products discussed with consented to blood products.

## 2019-12-09 NOTE — H&P
Chief Complaint   Patient presents with   • Pancreatic cyst         History of Present Illness:   74 y.o. female who has history of chronic nausea and a small lesion in the uncinate process of the pancreas.  She had a repeat MRI of the abdomen and 7 of 2019 that showed:  IMPRESSION:  Stable very tiny cyst in the pancreas that is most likely  produced by a localized cystic dilatation of a side branch of the main  pancreatic duct. Otherwise unremarkable MRI of the abdomen with MRCP  imaging.     This report was finalized on 7/19/2019 1:49 PM by Dr. Mich Rogers M.D.  She has a history of colonic adenomas and is due for an EGD and colonoscopy. She is actually due in 10 of 2019.. She takes a fiber supplement and it helps her bowels to move. She has nausea but not everyday. She walks 3 miles/day. No abdominal or chest pain. Weight stable. NO rectal bbleeding or melena. Denies NSAIDs use. Nonsmoker. ETOH - 2 glasses of wine if she goes out to dinner.      Medical History        Past Medical History:   Diagnosis Date   • Bipolar affect, depressed (CMS/HCC)     • Cervical myelopathy (CMS/HCC)     • Colon polyp     • Depression     • Gastritis     • GERD (gastroesophageal reflux disease)     • Migraine headache     • Pancreatic cyst       4 cm            Surgical History         Past Surgical History:   Procedure Laterality Date   • COLONOSCOPY   05/2013   • COLONOSCOPY N/A 10/31/2016     polyps, tics, IHtubular adenoma w/low grade dysplasia x 2, hyperplastic polyp x3   • ENDOSCOPY   11/2013   • ENDOSCOPY N/A 10/31/2016     erythematous mucosa in stomach   • TONSILLECTOMY       • UPPER GASTROINTESTINAL ENDOSCOPY                   Current Outpatient Medications:   •  Calcium Polycarbophil (FIBER-CAPS PO), Take 2 capsules by mouth Daily., Disp: , Rfl:   •  lamoTRIgine (LaMICtal) 100 MG tablet, Take 1.5 tablets by mouth 2 (Two) Times a Day., Disp: , Rfl:   •  LORazepam (ATIVAN) 0.5 MG tablet, Take 0.5 mg by mouth 2 (Two)  Times a Day As Needed., Disp: , Rfl: 2  •  prochlorperazine (COMPAZINE) 5 MG tablet, TAKE 1 TABLET BY MOUTH EVERY 6 (SIX) HOURS AS NEEDED FOR NAUSEA OR VOMITING., Disp: 60 tablet, Rfl: 2  •  Vortioxetine HBr (TRINTELLIX) 20 MG tablet, Take 1 tablet by mouth Daily. 15 mg per day, Disp: , Rfl:            Allergies   Allergen Reactions   • Lexapro [Escitalopram Oxalate] GI Intolerance       nausea   • Prozac [Fluoxetine Hcl] Nausea Only               Family History   Problem Relation Age of Onset   • Stroke Mother     • Cancer Father     • Cancer Maternal Grandmother     • Stroke Maternal Grandmother           Social History   Social History            Socioeconomic History   • Marital status: Single       Spouse name: Not on file   • Number of children: 0   • Years of education: Not on file   • Highest education level: Not on file   Occupational History   • Occupation:    Tobacco Use   • Smoking status: Former Smoker       Packs/day: 0.25       Years: 12.00       Pack years: 3.00       Types: Cigarettes       Last attempt to quit:        Years since quittin.8   • Smokeless tobacco: Never Used   • Tobacco comment: We discussed nicotine patch, gum, hypnosis.   Substance and Sexual Activity   • Alcohol use: Yes       Comment: social   • Drug use: Defer   • Sexual activity: Defer   Lifestyle   • Physical activity:       Days per week: 7 days       Minutes per session: 60 min   • Stress: Not on file            Review of Systems   All other systems reviewed and are negative.            Vitals:     19 0847   BP: 132/88   Temp: 98.3 °F (36.8 °C)         Physical Exam   Constitutional: She is oriented to person, place, and time. She appears well-developed and well-nourished. No distress.   HENT:   Head: Normocephalic and atraumatic. Hair is normal.   Right Ear: Hearing, tympanic membrane, external ear and ear canal normal. No drainage. No decreased hearing is noted.   Left Ear: Hearing, tympanic  membrane, external ear and ear canal normal. No decreased hearing is noted.   Nose: No nasal deformity.   Mouth/Throat: Oropharynx is clear and moist.   Eyes: Conjunctivae, EOM and lids are normal. Pupils are equal, round, and reactive to light. Right eye exhibits no discharge. Left eye exhibits no discharge.   Neck: Normal range of motion. Neck supple. No JVD present. No tracheal deviation present. No thyromegaly present.   Cardiovascular: Normal rate, regular rhythm, normal heart sounds, intact distal pulses and normal pulses. Exam reveals no gallop and no friction rub.   No murmur heard.  Pulmonary/Chest: Effort normal and breath sounds normal. No respiratory distress. She has no wheezes. She has no rales. She exhibits no tenderness.   Abdominal: Soft. Bowel sounds are normal. She exhibits no distension and no mass. There is no tenderness. There is no rebound and no guarding. No hernia.   Musculoskeletal: Normal range of motion. She exhibits no edema, tenderness or deformity.   Lymphadenopathy:     She has no cervical adenopathy.   Neurological: She is alert and oriented to person, place, and time. She has normal reflexes. She displays normal reflexes. No cranial nerve deficit. She exhibits normal muscle tone. Coordination normal.   Skin: Skin is warm and dry. No rash noted. She is not diaphoretic. No erythema.   Psychiatric: She has a normal mood and affect. Her behavior is normal. Judgment and thought content normal.   Vitals reviewed.        Lona was seen today for pancreatic cyst.     Diagnoses and all orders for this visit:     Pancreas cyst  -     CBC & Differential  -     Comprehensive Metabolic Panel  -     Amylase  -     Lipase  -     Cancer Antigen 19-9  -     Case Request; Standing  -     Case Request     Nausea  -     CBC & Differential  -     Comprehensive Metabolic Panel  -     Amylase  -     Lipase  -     Cancer Antigen 19-9  -     Case Request; Standing  -     Case Request     Neoplasm of  uncertain behavior of digestive organ, unspecified   -     Cancer Antigen 19-9  -     Case Request; Standing  -     Case Request     Pancreatic cyst  -     Case Request; Standing  -     Case Request     Polyp of colon, unspecified part of colon, unspecified type  -     Case Request; Standing  -     Case Request     Constipation, unspecified constipation type  -     Case Request; Standing  -     Case Request     Gastroesophageal reflux disease, esophagitis presence not specified  -     Case Request; Standing  -     Case Request     Other orders  -     Follow Anesthesia Guidelines / Standing Orders; Future  -     Obtain Informed Consent; Future  -     Implement Anesthesia orders day of procedure.; Standing  -     Obtain informed consent; Standing  -     Verify bowel prep was successful; Standing  -     Give tap water enema if bowel prep was insufficient; Standing        Assessment:  2) GERD  3) Small lesion in the uncinate process of the pancreas. She is due for a repeat MRI of the abdomen in 7/18.  4) h/o colonic adenomas.  5) Nausea  6) Constipation.        Recommendations:  1. CA 19-9, CBC, CMP  2. EGD and colonoscopy     No Follow-up on file.     12/9/19 - No change from the above H and P.   Thad Brumfield MD

## 2019-12-10 LAB
CYTO UR: NORMAL
LAB AP CASE REPORT: NORMAL
PATH REPORT.FINAL DX SPEC: NORMAL
PATH REPORT.GROSS SPEC: NORMAL
UREASE TISS QL: NEGATIVE

## 2020-01-27 RX ORDER — PROCHLORPERAZINE MALEATE 5 MG/1
TABLET ORAL
Qty: 60 TABLET | Refills: 2 | Status: SHIPPED | OUTPATIENT
Start: 2020-01-27 | End: 2020-03-27

## 2020-01-27 NOTE — TELEPHONE ENCOUNTER
Message sent to Dr Brumfield regarding refilling of compazine. Pt has appt with Dr Brumfield on 01/29.

## 2020-01-29 ENCOUNTER — OFFICE VISIT (OUTPATIENT)
Dept: GASTROENTEROLOGY | Facility: CLINIC | Age: 75
End: 2020-01-29

## 2020-01-29 VITALS
SYSTOLIC BLOOD PRESSURE: 128 MMHG | DIASTOLIC BLOOD PRESSURE: 72 MMHG | WEIGHT: 113.4 LBS | TEMPERATURE: 98.1 F | BODY MASS INDEX: 18.89 KG/M2 | HEIGHT: 65 IN

## 2020-01-29 DIAGNOSIS — K86.2 PANCREATIC CYST: ICD-10-CM

## 2020-01-29 DIAGNOSIS — R11.0 NAUSEA: Primary | ICD-10-CM

## 2020-01-29 PROCEDURE — 99214 OFFICE O/P EST MOD 30 MIN: CPT | Performed by: INTERNAL MEDICINE

## 2020-01-29 RX ORDER — OMEPRAZOLE 40 MG/1
40 CAPSULE, DELAYED RELEASE ORAL DAILY
Qty: 30 CAPSULE | Refills: 11 | Status: SHIPPED | OUTPATIENT
Start: 2020-01-29 | End: 2021-03-26

## 2020-01-29 NOTE — PROGRESS NOTES
Chief Complaint   Patient presents with   • follow up to colonoscopy       History of Present Illness:   74 y.o. female who has a history of a small cystic lesion in the uncinate process of the pancreas.  She last had an MRI of the abdomen and 7 of 2019 that showed a stable very tiny cyst in the pancreas that is most likely produced by a localized cystic dilation of a sidebranch the main pancreatic duct.  In 11 of 19 her amylase, lipase, and CA-19-9 were normal.       Patient had an EGD and a colonoscopy in 12 of 2019.  The EGD was done because of nausea.  It showed gastritis and otherwise was normal.  The colonoscopy was also done in 1219 because of a personal history of colon polyps.  Patient had multiple sigmoid colon diverticuli and internal hemorrhoids.  There were 3 small colon polyps that were removed.  Biopsies of the stomach showed minimal chronic inflammation with no evidence of Helicobacter pylori.  The colon polyps were tubular adenomas mixed with hyperplastic polyps.        She still has nausea occasionally. No headaches. No chest or abdominal pain. Weight stable. No diarrhea or constipation. She had a gastric emptying study 4 yrs ago that was normal. Fiber helps.     Past Medical History:   Diagnosis Date   • Bipolar affect, depressed (CMS/HCC)    • Cervical myelopathy (CMS/HCC)    • Colon polyp    • Depression    • Gastritis    • GERD (gastroesophageal reflux disease)    • H/O colonoscopy with polypectomy    • Migraine headache    • Pancreatic cyst     4 cm       Past Surgical History:   Procedure Laterality Date   • COLONOSCOPY  05/2013   • COLONOSCOPY N/A 10/31/2016    polyps, tics, IH, tubular adenoma w/low grade dysplasia x 2, hyperplastic polyp x3   • COLONOSCOPY N/A 12/9/2019    Procedure: COLONOSCOPY  WITH COLD BIOPSIES AND COLD SNARE POLYPECTOMY;  Surgeon: Thad Brumfield MD;  Location: [facility] ENDOSCOPY;  Service: Gastroenterology   • ENDOSCOPY  11/2013   • ENDOSCOPY N/A 10/31/2016     erythematous mucosa in stomach   • ENDOSCOPY N/A 2019    Procedure: ESOPHAGOGASTRODUODENOSCOPYEITH COLD BIOPSIES;  Surgeon: Thad Brumfield MD;  Location: [facility] ENDOSCOPY;  Service: Gastroenterology   • TONSILLECTOMY     • UPPER GASTROINTESTINAL ENDOSCOPY           Current Outpatient Medications:   •  Calcium Polycarbophil (FIBER-CAPS PO), Take 2 capsules by mouth Daily., Disp: , Rfl:   •  lamoTRIgine (LaMICtal) 100 MG tablet, Take 1.5 tablets by mouth 2 (Two) Times a Day., Disp: , Rfl:   •  LORazepam (ATIVAN) 0.5 MG tablet, Take 0.5 mg by mouth 2 (Two) Times a Day As Needed., Disp: , Rfl: 2  •  prochlorperazine (COMPAZINE) 5 MG tablet, TAKE 1 TABLET BY MOUTH EVERY 6 (SIX) HOURS AS NEEDED FOR NAUSEA OR VOMITING., Disp: 60 tablet, Rfl: 2  •  Vortioxetine HBr (TRINTELLIX) 20 MG tablet, Take 1 tablet by mouth Daily. 15 mg per day, Disp: , Rfl:   •  omeprazole (priLOSEC) 40 MG capsule, Take 1 capsule by mouth Daily., Disp: 30 capsule, Rfl: 11    Allergies   Allergen Reactions   • Lexapro [Escitalopram Oxalate] GI Intolerance     nausea   • Prozac [Fluoxetine Hcl] Nausea Only       Family History   Problem Relation Age of Onset   • Stroke Mother    • Cancer Father    • Cancer Maternal Grandmother    • Stroke Maternal Grandmother        Social History     Socioeconomic History   • Marital status: Single     Spouse name: Not on file   • Number of children: 0   • Years of education: Not on file   • Highest education level: Not on file   Occupational History   • Occupation:    Tobacco Use   • Smoking status: Former Smoker     Packs/day: 0.25     Years: 12.00     Pack years: 3.00     Types: Cigarettes     Last attempt to quit: 2019     Years since quittin.1   • Smokeless tobacco: Never Used   • Tobacco comment: We discussed nicotine patch, gum, hypnosis.   Substance and Sexual Activity   • Alcohol use: Yes     Comment: social   • Drug use: Never   • Sexual activity: Defer   Lifestyle   • Physical  activity:     Days per week: 7 days     Minutes per session: 60 min   • Stress: Not on file       Review of Systems   Gastrointestinal: Negative for abdominal pain.   All other systems reviewed and are negative.    Pertinent positives and negatives documented in the HPI and all other systems reviewed and were found to be negative.  Vitals:    01/29/20 0939   BP: 128/72   Temp: 98.1 °F (36.7 °C)       Physical Exam   Constitutional: She is oriented to person, place, and time. She appears well-developed and well-nourished. No distress.   HENT:   Head: Normocephalic and atraumatic. Hair is normal.   Right Ear: Hearing, tympanic membrane, external ear and ear canal normal. No drainage. No decreased hearing is noted.   Left Ear: Hearing, tympanic membrane, external ear and ear canal normal. No decreased hearing is noted.   Nose: No nasal deformity.   Mouth/Throat: Oropharynx is clear and moist.   Eyes: Pupils are equal, round, and reactive to light. Conjunctivae, EOM and lids are normal. Right eye exhibits no discharge. Left eye exhibits no discharge.   Neck: Normal range of motion. Neck supple. No JVD present. No tracheal deviation present. No thyromegaly present.   Cardiovascular: Normal rate, regular rhythm, normal heart sounds, intact distal pulses and normal pulses. Exam reveals no gallop and no friction rub.   No murmur heard.  Pulmonary/Chest: Effort normal and breath sounds normal. No respiratory distress. She has no wheezes. She has no rales. She exhibits no tenderness.   Abdominal: Soft. Bowel sounds are normal. She exhibits no distension and no mass. There is no tenderness. There is no rebound and no guarding. No hernia.   Musculoskeletal: Normal range of motion. She exhibits no edema, tenderness or deformity.   Lymphadenopathy:     She has no cervical adenopathy.   Neurological: She is alert and oriented to person, place, and time. She has normal reflexes. She displays normal reflexes. No cranial nerve  deficit. She exhibits normal muscle tone. Coordination normal.   Skin: Skin is warm and dry. No rash noted. She is not diaphoretic. No erythema.   Psychiatric: She has a normal mood and affect. Her behavior is normal. Judgment and thought content normal.   Vitals reviewed.      Lona was seen today for follow up to colonoscopy.    Diagnoses and all orders for this visit:    Nausea  -     omeprazole (priLOSEC) 40 MG capsule; Take 1 capsule by mouth Daily.    Pancreatic cyst  -     MRI abdomen w contrast mrcp; Future      Assessment:  1.  History of colonic adenomas.  2.  History of chronic nausea.  Last had an EGD in 12/2019.  3.  She has a history of a small cystic lesion in the uncinate process of the pancreas that on most recent MR CP (in 7 of 2019) was felt to be a localized cystic dilation of the sidebranch of the main pancreatic duct.  Her CA-19-9 in 11 of 2019 was normal at 10.    Recommendations:  1. Repeat colonoscopy in 3 yrs.   2 She prefers no gastric emptying study.   3. Trial of Omeprazole 40 mg/day to see if the nausea goes away.  4. In 7/20 get another MRCP to relook at the cystic lesion in the uncinate process of the pancreas  5. F/U after the 7/20 MRCP.      Return f/u 8/2020..    Thad Brumfield MD  1/29/2020

## 2020-03-27 RX ORDER — PROCHLORPERAZINE MALEATE 5 MG/1
TABLET ORAL
Qty: 60 TABLET | Refills: 2 | Status: SHIPPED | OUTPATIENT
Start: 2020-03-27 | End: 2020-05-28

## 2020-05-28 RX ORDER — PROCHLORPERAZINE MALEATE 5 MG/1
TABLET ORAL
Qty: 60 TABLET | Refills: 2 | Status: SHIPPED | OUTPATIENT
Start: 2020-05-28 | End: 2021-11-03

## 2020-07-22 DIAGNOSIS — K86.2 PANCREAS CYST: Primary | ICD-10-CM

## 2020-07-27 ENCOUNTER — HOSPITAL ENCOUNTER (OUTPATIENT)
Dept: MRI IMAGING | Facility: HOSPITAL | Age: 75
End: 2020-07-27

## 2020-07-28 ENCOUNTER — OFFICE VISIT (OUTPATIENT)
Dept: INTERNAL MEDICINE | Age: 75
End: 2020-07-28

## 2020-07-28 VITALS
WEIGHT: 114.4 LBS | RESPIRATION RATE: 13 BRPM | HEART RATE: 87 BPM | TEMPERATURE: 96.8 F | SYSTOLIC BLOOD PRESSURE: 120 MMHG | OXYGEN SATURATION: 98 % | DIASTOLIC BLOOD PRESSURE: 70 MMHG | BODY MASS INDEX: 19.06 KG/M2 | HEIGHT: 65 IN

## 2020-07-28 DIAGNOSIS — K21.9 GASTROESOPHAGEAL REFLUX DISEASE WITHOUT ESOPHAGITIS: Primary | ICD-10-CM

## 2020-07-28 DIAGNOSIS — F31.9 BIPOLAR AFFECTIVE DISORDER, REMISSION STATUS UNSPECIFIED (HCC): Chronic | ICD-10-CM

## 2020-07-28 PROCEDURE — 99214 OFFICE O/P EST MOD 30 MIN: CPT | Performed by: INTERNAL MEDICINE

## 2020-07-28 RX ORDER — BUPROPION HYDROCHLORIDE 150 MG/1
150 TABLET, EXTENDED RELEASE ORAL 2 TIMES DAILY
COMMUNITY

## 2020-07-28 NOTE — PROGRESS NOTES
"  Lona Thomson is a 74 y.o. female who presents with   Chief Complaint   Patient presents with   • Heartburn     Effective management with omeprazole 40 mg daily   • Manic Behavior     Psychiatric care-[facility]-Dr. [place]   .    74-year-old female.  Former patient of Dr. Linn.  Yearly \"checkup visit\".  No complaints.  Basic history as outlined above.    Heartburn   Currently asymptomatic. This is a chronic problem. The current episode started more than 1 year ago. The problem has been waxing and waning. Treatments tried: Omeprazole 40 mg daily.      Bipolar disorder: History as above.      /70   Pulse 87   Temp 96.8 °F (36 °C)   Resp 13   Ht 165.1 cm (65\")   Wt 51.9 kg (114 lb 6.4 oz)   SpO2 98%   BMI 19.04 kg/m²     The following portions of the patient's history were reviewed and updated as appropriate: allergies, current medications, past medical history and problem list.    Review of Systems   Constitutional: Negative.    HENT: Negative.    Eyes: Negative.    Respiratory: Negative.    Cardiovascular: Negative.    Genitourinary: Negative.    Musculoskeletal: Negative.    Skin: Negative.    Neurological: Negative.    Psychiatric/Behavioral: Negative.        Objective   Physical Exam   Constitutional: She is oriented to person, place, and time. She appears well-developed and well-nourished. No distress.   HENT:   Head: Normocephalic and atraumatic.   Eyes: Pupils are equal, round, and reactive to light. Conjunctivae and EOM are normal.   Neck: Normal range of motion. Neck supple. No thyromegaly present.   Neck exam negative.  Carotid auscultation normal-no bruits heard.   Cardiovascular: Normal rate, regular rhythm, normal heart sounds and intact distal pulses. Exam reveals no gallop and no friction rub.   No murmur heard.  Pulmonary/Chest: Effort normal and breath sounds normal. No respiratory distress. She has no wheezes. She has no rales. She exhibits no tenderness. "   Neurological: She is alert and oriented to person, place, and time.   Psychiatric: She has a normal mood and affect. Her behavior is normal. Judgment and thought content normal.   Nursing note and vitals reviewed.      Assessment/Plan   Lona was seen today for heartburn and manic behavior.    Diagnoses and all orders for this visit:    Gastroesophageal reflux disease without esophagitis    Bipolar affective disorder, remission status unspecified (CMS/[facility])        Plan: Continue treatment as prescribed for the issues as outlined above.    Medicare wellness visit with lab updates-6 months

## 2020-08-11 ENCOUNTER — HOSPITAL ENCOUNTER (OUTPATIENT)
Dept: MRI IMAGING | Facility: HOSPITAL | Age: 75
Discharge: HOME | End: 2020-08-11
Admitting: INTERNAL MEDICINE

## 2020-08-11 DIAGNOSIS — K86.2 PANCREAS CYST: ICD-10-CM

## 2020-08-11 PROCEDURE — A9575 INJ GADOTERATE MEGLUMI 0.1ML: HCPCS | Performed by: INTERNAL MEDICINE

## 2020-08-11 PROCEDURE — 74183 MRI ABD W/O CNTR FLWD CNTR: CPT

## 2020-08-11 PROCEDURE — 25010000002 GADOTERATE MEGLUMINE 5 MMOL/10ML SOLUTION: Performed by: INTERNAL MEDICINE

## 2020-08-11 PROCEDURE — 82565 ASSAY OF CREATININE: CPT

## 2020-08-11 RX ORDER — GADOTERATE MEGLUMINE 376.9 MG/ML
10 INJECTION INTRAVENOUS
Status: COMPLETED | OUTPATIENT
Start: 2020-08-11 | End: 2020-08-11

## 2020-08-11 RX ADMIN — GADOTERATE MEGLUMINE 10 ML: 376.9 INJECTION, SOLUTION INTRAVENOUS at 13:59

## 2020-08-12 LAB — CREAT BLDA-MCNC: 0.9 MG/DL (ref 0.6–1.3)

## 2020-08-12 NOTE — PROGRESS NOTES
08/12/20  Tell her that the pancreatic cyst focus is still 3 mm in diameter and is unchanged since the 7/19 MRI of the abdomen. Why don't we get another CA 19-9 blood test. If she is OK with this then please have her come by the office to have this drawn. We can discuss in more detail when I see her in f/u in the office. FAX to her PCP.   Ibrahima. [email or web address]

## 2020-08-24 ENCOUNTER — TELEPHONE (OUTPATIENT)
Dept: GASTROENTEROLOGY | Facility: CLINIC | Age: 75
End: 2020-08-24

## 2020-08-24 NOTE — TELEPHONE ENCOUNTER
Call to pt.  Advise per Dr Brumfield note.  Verb understanding.      Will call back to make lab appt.     MRCP faxed via epic to DR Alireza Vega.

## 2020-08-24 NOTE — TELEPHONE ENCOUNTER
----- Message from Thad Brumfield MD sent at 8/12/2020  7:18 PM EDT -----  08/12/20  Tell her that the pancreatic cyst focus is still 3 mm in diameter and is unchanged since the 7/19 MRI of the abdomen. Why don't we get another CA 19-9 blood test. If she is OK with this then please have her come by the office to have this drawn. We can discuss in more detail when I see her in f/u in the office. FAX to her PCP.   Ibrahima. corine

## 2020-10-08 ENCOUNTER — LAB (OUTPATIENT)
Dept: GASTROENTEROLOGY | Facility: CLINIC | Age: 75
End: 2020-10-08

## 2020-10-09 LAB — CANCER AG19-9 SERPL-ACNC: 9 U/ML (ref 0–35)

## 2020-10-12 NOTE — PROGRESS NOTES
10/11/20  Tell her that her CA 19-9 (pancreatic tumor marker) came back normal again at 9. 11 mos ago it was 10 and 2 yrs ago it was 11. This is good. FAX to her PCP.  Jorge stein

## 2020-12-10 ENCOUNTER — OFFICE VISIT (OUTPATIENT)
Dept: INTERNAL MEDICINE | Facility: CLINIC | Age: 75
End: 2020-12-10

## 2020-12-10 VITALS
WEIGHT: 117 LBS | DIASTOLIC BLOOD PRESSURE: 78 MMHG | TEMPERATURE: 97.6 F | BODY MASS INDEX: 19.49 KG/M2 | HEIGHT: 65 IN | HEART RATE: 78 BPM | SYSTOLIC BLOOD PRESSURE: 118 MMHG | OXYGEN SATURATION: 99 %

## 2020-12-10 DIAGNOSIS — Z79.899 HIGH RISK MEDICATION USE: ICD-10-CM

## 2020-12-10 DIAGNOSIS — Z13.1 SCREENING FOR DIABETES MELLITUS: ICD-10-CM

## 2020-12-10 DIAGNOSIS — Z00.00 MEDICARE ANNUAL WELLNESS VISIT, SUBSEQUENT: Primary | ICD-10-CM

## 2020-12-10 DIAGNOSIS — Z78.0 POSTMENOPAUSE: ICD-10-CM

## 2020-12-10 DIAGNOSIS — Z13.820 ENCOUNTER FOR SCREENING FOR OSTEOPOROSIS: ICD-10-CM

## 2020-12-10 DIAGNOSIS — Z12.31 ENCOUNTER FOR SCREENING MAMMOGRAM FOR MALIGNANT NEOPLASM OF BREAST: ICD-10-CM

## 2020-12-10 DIAGNOSIS — Z11.4 ENCOUNTER FOR SCREENING FOR HIV: ICD-10-CM

## 2020-12-10 DIAGNOSIS — Z13.220 ENCOUNTER FOR SCREENING FOR LIPID DISORDER: ICD-10-CM

## 2020-12-10 DIAGNOSIS — Z11.59 ENCOUNTER FOR HEPATITIS C SCREENING TEST FOR LOW RISK PATIENT: ICD-10-CM

## 2020-12-10 PROCEDURE — G0439 PPPS, SUBSEQ VISIT: HCPCS | Performed by: FAMILY MEDICINE

## 2020-12-10 NOTE — PATIENT INSTRUCTIONS
Health Maintenance After Age 65  After age 65, you are at a higher risk for certain long-term diseases and infections as well as injuries from falls. Falls are a major cause of broken bones and head injuries in people who are older than age 65. Getting regular preventive care can help to keep you healthy and well. Preventive care includes getting regular testing and making lifestyle changes as recommended by your health care provider. Talk with your health care provider about:  · Which screenings and tests you should have. A screening is a test that checks for a disease when you have no symptoms.  · A diet and exercise plan that is right for you.  What should I know about screenings and tests to prevent falls?  Screening and testing are the best ways to find a health problem early. Early diagnosis and treatment give you the best chance of managing medical conditions that are common after age 65. Certain conditions and lifestyle choices may make you more likely to have a fall. Your health care provider may recommend:  · Regular vision checks. Poor vision and conditions such as cataracts can make you more likely to have a fall. If you wear glasses, make sure to get your prescription updated if your vision changes.  · Medicine review. Work with your health care provider to regularly review all of the medicines you are taking, including over-the-counter medicines. Ask your health care provider about any side effects that may make you more likely to have a fall. Tell your health care provider if any medicines that you take make you feel dizzy or sleepy.  · Osteoporosis screening. Osteoporosis is a condition that causes the bones to get weaker. This can make the bones weak and cause them to break more easily.  · Blood pressure screening. Blood pressure changes and medicines to control blood pressure can make you feel dizzy.  · Strength and balance checks. Your health care provider may recommend certain tests to check your  strength and balance while standing, walking, or changing positions.  · Foot health exam. Foot pain and numbness, as well as not wearing proper footwear, can make you more likely to have a fall.  · Depression screening. You may be more likely to have a fall if you have a fear of falling, feel emotionally low, or feel unable to do activities that you used to do.  · Alcohol use screening. Using too much alcohol can affect your balance and may make you more likely to have a fall.  What actions can I take to lower my risk of falls?  General instructions  · Talk with your health care provider about your risks for falling. Tell your health care provider if:  ? You fall. Be sure to tell your health care provider about all falls, even ones that seem minor.  ? You feel dizzy, sleepy, or off-balance.  · Take over-the-counter and prescription medicines only as told by your health care provider. These include any supplements.  · Eat a healthy diet and maintain a healthy weight. A healthy diet includes low-fat dairy products, low-fat (lean) meats, and fiber from whole grains, beans, and lots of fruits and vegetables.  Home safety  · Remove any tripping hazards, such as rugs, cords, and clutter.  · Install safety equipment such as grab bars in bathrooms and safety rails on stairs.  · Keep rooms and walkways well-lit.  Activity    · Follow a regular exercise program to stay fit. This will help you maintain your balance. Ask your health care provider what types of exercise are appropriate for you.  · If you need a cane or walker, use it as recommended by your health care provider.  · Wear supportive shoes that have nonskid soles.  Lifestyle  · Do not drink alcohol if your health care provider tells you not to drink.  · If you drink alcohol, limit how much you have:  ? 0-1 drink a day for women.  ? 0-2 drinks a day for men.  · Be aware of how much alcohol is in your drink. In the [place] one drink equals one typical bottle of beer (12  oz), one-half glass of wine (5 oz), or one shot of hard liquor (1½ oz).  · Do not use any products that contain nicotine or tobacco, such as cigarettes and e-cigarettes. If you need help quitting, ask your health care provider.  Summary  · Having a healthy lifestyle and getting preventive care can help to protect your health and wellness after age 65.  · Screening and testing are the best way to find a health problem early and help you avoid having a fall. Early diagnosis and treatment give you the best chance for managing medical conditions that are more common for people who are older than age 65.  · Falls are a major cause of broken bones and head injuries in people who are older than age 65. Take precautions to prevent a fall at home.  · Work with your health care provider to learn what changes you can make to improve your health and wellness and to prevent falls.  This information is not intended to replace advice given to you by your health care provider. Make sure you discuss any questions you have with your health care provider.  Document Revised: 04/09/2020 Document Reviewed: 10/31/2018  Elsevier Patient Education © 2020 [employer]

## 2020-12-10 NOTE — PROGRESS NOTES
The ABCs of the Annual Wellness Visit  Subsequent Medicare Wellness Visit    Chief Complaint   Patient presents with   • Establish Care   • Medicare Wellness-subsequent       Subjective   History of Present Illness:  Lona Thomson is a 75 y.o. female who presents for a Subsequent Medicare Wellness Visit.    HEALTH RISK ASSESSMENT    Recent Hospitalizations:  No hospitalization(s) within the last year.    Current Medical Providers:  Patient Care Team:  Ludwin Herrmann MD as PCP - General (Family Medicine)  Thad Brumfield MD as Consulting Physician (Gastroenterology)  Adeola Kaufman MD as Consulting Physician (Gynecology)  Ashlee Hampton MD (Psychiatry)    Smoking Status:  Social History     Tobacco Use   Smoking Status Former Smoker   • Packs/day: 0.25   • Years: 12.00   • Pack years: 3.00   • Types: Cigarettes   • Quit date: 2019   • Years since quittin.0   Smokeless Tobacco Never Used   Tobacco Comment    We discussed nicotine patch, gum, hypnosis.       Alcohol Consumption:  Social History     Substance and Sexual Activity   Alcohol Use Yes    Comment: social       Depression Screen:   PHQ-2/PHQ-9 Depression Screening 12/10/2020   Little interest or pleasure in doing things 0   Feeling down, depressed, or hopeless 0   Trouble falling or staying asleep, or sleeping too much 0   Feeling tired or having little energy 0   Poor appetite or overeating 0   Feeling bad about yourself - or that you are a failure or have let yourself or your family down 0   Trouble concentrating on things, such as reading the newspaper or watching television 0   Moving or speaking so slowly that other people could have noticed. Or the opposite - being so fidgety or restless that you have been moving around a lot more than usual 0   Thoughts that you would be better off dead, or of hurting yourself in some way 0   Total Score 0   If you checked off any problems, how difficult have these problems made it for you to do  your work, take care of things at home, or get along with other people? Not difficult at all       Fall Risk Screen:  MU Fall Risk Assessment was completed, and patient is at LOW risk for falls.Assessment completed on:12/10/2020    Health Habits and Functional and Cognitive Screening:  Functional & Cognitive Status 12/10/2020   Do you have difficulty preparing food and eating? No   Do you have difficulty bathing yourself, getting dressed or grooming yourself? No   Do you have difficulty using the toilet? No   Do you have difficulty moving around from place to place? No   Do you have trouble with steps or getting out of a bed or a chair? No   Current Diet Well Balanced Diet   Dental Exam Not up to date   Eye Exam Up to date   Exercise (times per week) 7 times per week   Current Exercises Include Walking   Current Exercise Activities Include -   Do you need help using the phone?  No   Are you deaf or do you have serious difficulty hearing?  No   Do you need help with transportation? No   Do you need help shopping? No   Do you need help preparing meals?  No   Do you need help with housework?  No   Do you need help with laundry? No   Do you need help taking your medications? No   Do you need help managing money? No   Do you ever drive or ride in a car without wearing a seat belt? No   Have you felt unusual stress, anger or loneliness in the last month? No   Who do you live with? Alone   If you need help, do you have trouble finding someone available to you? No   Have you been bothered in the last four weeks by sexual problems? No   Do you have difficulty concentrating, remembering or making decisions? No         Does the patient have evidence of cognitive impairment? No    Asprin use counseling:Does not need ASA (and currently is not on it)    Age-appropriate Screening Schedule:  Refer to the list below for future screening recommendations based on patient's age, sex and/or medical conditions. Orders for these  recommended tests are listed in the plan section. The patient has been provided with a written plan.    Health Maintenance   Topic Date Due   • MAMMOGRAM  02/15/2020   • ZOSTER VACCINE (2 of 3) 11/25/2020   • TDAP/TD VACCINES (2 - Td) 01/15/2029   • COLONOSCOPY  12/09/2029   • INFLUENZA VACCINE  Completed          The following portions of the patient's history were reviewed and updated as appropriate: allergies, current medications, past family history, past medical history, past social history, past surgical history and problem list.    Outpatient Medications Prior to Visit   Medication Sig Dispense Refill   • buPROPion SR (WELLBUTRIN SR) 150 MG 12 hr tablet Take 150 mg by mouth 2 (Two) Times a Day.     • Calcium Polycarbophil (FIBER-CAPS PO) Take 2 capsules by mouth Daily.     • lamoTRIgine (LaMICtal) 100 MG tablet Take 1.5 tablets by mouth 2 (Two) Times a Day.     • LORazepam (ATIVAN) 0.5 MG tablet Take 0.5 mg by mouth 2 (Two) Times a Day As Needed.  2   • omeprazole (priLOSEC) 40 MG capsule Take 1 capsule by mouth Daily. 30 capsule 11   • prochlorperazine (COMPAZINE) 5 MG tablet TAKE 1 TABLET BY MOUTH EVERY 6 (SIX) HOURS AS NEEDED FOR NAUSEA OR VOMITING. 60 tablet 2   • Vortioxetine HBr (TRINTELLIX PO) Take 1 tablet by mouth Daily. 15 mg per day       No facility-administered medications prior to visit.        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 Patient has an advance  "directive (not in EMR), copy requested.    Review of Systems   Constitutional: Negative for activity change, appetite change, fatigue and fever.   HENT: Negative for ear pain, facial swelling and sore throat.    Eyes: Negative for discharge and itching.   Respiratory: Negative for cough, chest tightness and shortness of breath.    Cardiovascular: Negative for chest pain and palpitations.   Gastrointestinal: Negative for abdominal distention and constipation.   Endocrine: Negative for polydipsia, polyphagia and polyuria.   Genitourinary: Negative for difficulty urinating and flank pain.   Musculoskeletal: Negative for arthralgias and back pain.   Skin: Negative for color change, rash and wound.   Allergic/Immunologic: Negative for environmental allergies and food allergies.   Neurological: Negative for weakness and numbness.   Hematological: Negative for adenopathy. Does not bruise/bleed easily.   Psychiatric/Behavioral: Negative for decreased concentration and dysphoric mood. The patient is not nervous/anxious.        Compared to one year ago, the patient feels her physical health is the same.  Compared to one year ago, the patient feels her mental health is the same.    Reviewed chart for potential of high risk medication in the elderly: yes  Reviewed chart for potential of harmful drug interactions in the elderly:yes    Objective         Vitals:    12/10/20 1446   BP: 118/78   BP Location: Left arm   Patient Position: Sitting   Cuff Size: Adult   Pulse: 78   Temp: 97.6 °F (36.4 °C)   TempSrc: Oral   SpO2: 99%   Weight: 53.1 kg (117 lb)   Height: 165.1 cm (65\")   PainSc: 0-No pain       Body mass index is 19.47 kg/m².  Discussed the patient's BMI with her. The BMI is in the acceptable range.    Physical Exam  Vitals signs and nursing note reviewed.   Constitutional:       General: She is not in acute distress.     Appearance: Normal appearance. She is well-developed.   HENT:      Mouth/Throat:      Pharynx: No " oropharyngeal exudate.   Eyes:      General:         Right eye: No discharge.         Left eye: No discharge.      Conjunctiva/sclera: Conjunctivae normal.   Neck:      Musculoskeletal: Neck supple.   Cardiovascular:      Rate and Rhythm: Normal rate and regular rhythm.      Heart sounds: Normal heart sounds. No murmur. No friction rub. No gallop.    Pulmonary:      Effort: Pulmonary effort is normal.      Breath sounds: Normal breath sounds. No wheezing or rales.   Abdominal:      General: Bowel sounds are normal. There is no distension.      Palpations: Abdomen is soft.      Tenderness: There is no abdominal tenderness.   Musculoskeletal:         General: No deformity.   Lymphadenopathy:      Cervical: No cervical adenopathy.   Skin:     General: Skin is warm and dry.      Findings: No rash.   Neurological:      Mental Status: She is alert and oriented to person, place, and time.   Psychiatric:         Mood and Affect: Mood normal.         Behavior: Behavior normal.               Assessment/Plan   Medicare Risks and Personalized Health Plan  CMS Preventative Services Quick Reference  Advance Directive Discussion  Breast Cancer/Mammogram Screening  Cardiovascular risk  Chronic Pain   Colon Cancer Screening  Dementia/Memory   Depression/Dysphoria  Diabetic Lab Screening   Immunizations Discussed/Encouraged (specific immunizations; Influenza and Pneumococcal 23 )  Osteoprorosis Risk    Quit smoking 15 years ago and was never a consistent smoker.      The above risks/problems have been discussed with the patient.  Pertinent information has been shared with the patient in the After Visit Summary.  Follow up plans and orders are seen below in the Assessment/Plan Section.    Diagnoses and all orders for this visit:    1. Medicare annual wellness visit, subsequent (Primary)  -     Mammo Screening Bilateral With CAD; Future  -     DEXA Bone Density Axial; Future  -     CBC (No Diff); Future  -     Comprehensive Metabolic  Panel; Future  -     Lipid Panel; Future  -     Hepatitis C Antibody; Future  -     HIV-1 / O / 2 Ag / Antibody 4th Generation; Future    2. Encounter for screening mammogram for malignant neoplasm of breast  -     Mammo Screening Bilateral With CAD; Future    3. Postmenopause  -     DEXA Bone Density Axial; Future    4. Encounter for screening for osteoporosis  -     DEXA Bone Density Axial; Future    5. Encounter for hepatitis C screening test for low risk patient  -     Hepatitis C Antibody; Future    6. Encounter for screening for HIV  -     HIV-1 / O / 2 Ag / Antibody 4th Generation; Future    7. Encounter for screening for lipid disorder  -     Lipid Panel; Future    8. High risk medication use  -     CBC (No Diff); Future  -     Comprehensive Metabolic Panel; Future  -     Lipid Panel; Future    9. Screening for diabetes mellitus  -     Comprehensive Metabolic Panel; Future      Follow Up:  Return in about 1 year (around 12/11/2021) for Medicare Wellness.     An After Visit Summary and PPPS were given to the patient.

## 2020-12-11 ENCOUNTER — RESULTS ENCOUNTER (OUTPATIENT)
Dept: INTERNAL MEDICINE | Facility: CLINIC | Age: 75
End: 2020-12-11

## 2020-12-11 DIAGNOSIS — Z79.899 HIGH RISK MEDICATION USE: ICD-10-CM

## 2020-12-11 DIAGNOSIS — Z13.1 SCREENING FOR DIABETES MELLITUS: ICD-10-CM

## 2020-12-11 DIAGNOSIS — Z11.4 ENCOUNTER FOR SCREENING FOR HIV: ICD-10-CM

## 2020-12-11 DIAGNOSIS — Z13.220 ENCOUNTER FOR SCREENING FOR LIPID DISORDER: ICD-10-CM

## 2020-12-11 DIAGNOSIS — Z11.59 ENCOUNTER FOR HEPATITIS C SCREENING TEST FOR LOW RISK PATIENT: ICD-10-CM

## 2020-12-11 DIAGNOSIS — Z00.00 MEDICARE ANNUAL WELLNESS VISIT, SUBSEQUENT: ICD-10-CM

## 2020-12-16 ENCOUNTER — OFFICE VISIT (OUTPATIENT)
Dept: GASTROENTEROLOGY | Facility: CLINIC | Age: 75
End: 2020-12-16

## 2020-12-16 VITALS
TEMPERATURE: 96 F | WEIGHT: 118.8 LBS | BODY MASS INDEX: 19.79 KG/M2 | DIASTOLIC BLOOD PRESSURE: 90 MMHG | HEIGHT: 65 IN | SYSTOLIC BLOOD PRESSURE: 138 MMHG

## 2020-12-16 DIAGNOSIS — K59.00 CONSTIPATION, UNSPECIFIED CONSTIPATION TYPE: ICD-10-CM

## 2020-12-16 DIAGNOSIS — K86.2 PANCREAS CYST: Primary | ICD-10-CM

## 2020-12-16 DIAGNOSIS — K63.5 POLYP OF COLON, UNSPECIFIED PART OF COLON, UNSPECIFIED TYPE: ICD-10-CM

## 2020-12-16 DIAGNOSIS — K21.9 GASTROESOPHAGEAL REFLUX DISEASE, UNSPECIFIED WHETHER ESOPHAGITIS PRESENT: ICD-10-CM

## 2020-12-16 DIAGNOSIS — R11.0 NAUSEA: ICD-10-CM

## 2020-12-16 PROCEDURE — 99214 OFFICE O/P EST MOD 30 MIN: CPT | Performed by: INTERNAL MEDICINE

## 2020-12-16 NOTE — PROGRESS NOTES
Chief Complaint   Patient presents with   • Follow-up   • Pancreas cyst       History of Present Illness:   75 y.o. female who feels good. The fiber has helped the constipation. No abdominal or chest pain. The nausea is only occasional since taking Omeprazole 40 mg/day, which helps. No dysphagia. No fevers, chills. NO diarrhea, no consitpation. No rectal bleeding or melena.        She has a history of a small cystic lesion in the uncinate process of the pancreas.  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     This report was finalized on 8/12/2020 2:39 PM by Dr. Salome Suazo M.D.     In 10 of 2020 her CA 19-9 was normal at 9.       She had an EGD and a colonoscopy in 12 of 2019.     Past Medical History:   Diagnosis Date   • Bipolar affect, depressed (CMS/HCC)    • Cervical myelopathy (CMS/HCC)    • Colon polyp    • Depression    • Gastritis    • GERD (gastroesophageal reflux disease)    • H/O colonoscopy with polypectomy    • Migraine headache    • Pancreatic cyst     4 cm       Past Surgical History:   Procedure Laterality Date   • COLONOSCOPY  05/2013   • COLONOSCOPY N/A 10/31/2016    polyps, tics, IH, tubular adenoma w/low grade dysplasia x 2, hyperplastic polyp x3   • COLONOSCOPY N/A 12/9/2019    Procedure: COLONOSCOPY  WITH COLD BIOPSIES AND COLD SNARE POLYPECTOMY;  Surgeon: Thad Brumfield MD;  Location: [facility] ENDOSCOPY;  Service: Gastroenterology   • ENDOSCOPY  11/2013   • ENDOSCOPY N/A 10/31/2016    erythematous mucosa in stomach   • ENDOSCOPY N/A 12/9/2019    Procedure: ESOPHAGOGASTRODUODENOSCOPYEITH COLD BIOPSIES;  Surgeon: Thad Brumfield MD;  Location: [facility] ENDOSCOPY;  Service: Gastroenterology   • TONSILLECTOMY     • UPPER GASTROINTESTINAL ENDOSCOPY           Current Outpatient Medications:   •  buPROPion SR (WELLBUTRIN  SR) 150 MG 12 hr tablet, Take 150 mg by mouth 2 (Two) Times a Day., Disp: , Rfl:   •  Calcium Polycarbophil (FIBER-CAPS PO), Take 2 capsules by mouth Daily., Disp: , Rfl:   •  lamoTRIgine (LaMICtal) 100 MG tablet, Take 1.5 tablets by mouth 2 (Two) Times a Day., Disp: , Rfl:   •  LORazepam (ATIVAN) 0.5 MG tablet, Take 0.5 mg by mouth 2 (Two) Times a Day As Needed., Disp: , Rfl: 2  •  omeprazole (priLOSEC) 40 MG capsule, Take 1 capsule by mouth Daily., Disp: 30 capsule, Rfl: 11  •  prochlorperazine (COMPAZINE) 5 MG tablet, TAKE 1 TABLET BY MOUTH EVERY 6 (SIX) HOURS AS NEEDED FOR NAUSEA OR VOMITING., Disp: 60 tablet, Rfl: 2  •  Vortioxetine HBr (TRINTELLIX PO), Take 1 tablet by mouth Daily. 15 mg per day, Disp: , Rfl:     Allergies   Allergen Reactions   • Lexapro [Escitalopram Oxalate] GI Intolerance     nausea   • Prozac [Fluoxetine Hcl] Nausea Only       Family History   Problem Relation Age of Onset   • Stroke Mother    • Cancer Father    • Cancer Maternal Grandmother    • Stroke Maternal Grandmother        Social History     Socioeconomic History   • Marital status: Single     Spouse name: Not on file   • Number of children: 0   • Years of education: Not on file   • Highest education level: Not on file   Occupational History   • Occupation:    Tobacco Use   • Smoking status: Former Smoker     Packs/day: 0.25     Years: 12.00     Pack years: 3.00     Types: Cigarettes     Quit date: 2019     Years since quittin.0   • Smokeless tobacco: Never Used   • Tobacco comment: We discussed nicotine patch, gum, hypnosis.   Substance and Sexual Activity   • Alcohol use: Yes     Comment: social   • Drug use: Never   • Sexual activity: Defer   Lifestyle   • Physical activity     Days per week: 7 days     Minutes per session: 60 min   • Stress: Not on file       Review of Systems   Gastrointestinal: Negative for abdominal pain.     Pertinent positives and negatives documented in the HPI and all other systems  reviewed and were found to be negative.  Vitals:    12/16/20 0854   BP: 138/90   Temp: 96 °F (35.6 °C)       Physical Exam  Vitals signs reviewed.   Constitutional:       General: She is not in acute distress.     Appearance: Normal appearance. She is well-developed. She is not diaphoretic.   HENT:      Head: Normocephalic and atraumatic. Hair is normal.      Right Ear: Hearing, tympanic membrane, ear canal and external ear normal. No decreased hearing noted. No drainage.      Left Ear: Hearing, tympanic membrane, ear canal and external ear normal. No decreased hearing noted.      Nose: Nose normal. No nasal deformity.      Mouth/Throat:      Mouth: Mucous membranes are moist.   Eyes:      General: Lids are normal.         Right eye: No discharge.         Left eye: No discharge.      Extraocular Movements: Extraocular movements intact.      Conjunctiva/sclera: Conjunctivae normal.      Pupils: Pupils are equal, round, and reactive to light.   Neck:      Musculoskeletal: Normal range of motion and neck supple.      Thyroid: No thyromegaly.      Vascular: No JVD.      Trachea: No tracheal deviation.   Cardiovascular:      Rate and Rhythm: Normal rate and regular rhythm.      Pulses: Normal pulses.      Heart sounds: Normal heart sounds. No murmur. No friction rub. No gallop.    Pulmonary:      Effort: Pulmonary effort is normal. No respiratory distress.      Breath sounds: Normal breath sounds. No wheezing or rales.   Chest:      Chest wall: No tenderness.   Abdominal:      General: Bowel sounds are normal. There is no distension.      Palpations: Abdomen is soft. There is no mass.      Tenderness: There is no abdominal tenderness. There is no guarding or rebound.      Hernia: No hernia is present.   Musculoskeletal: Normal range of motion.         General: No tenderness or deformity.   Lymphadenopathy:      Cervical: No cervical adenopathy.   Skin:     General: Skin is warm and dry.      Findings: No erythema or  rash.   Neurological:      Mental Status: She is alert and oriented to person, place, and time.      Cranial Nerves: No cranial nerve deficit.      Motor: No abnormal muscle tone.      Coordination: Coordination normal.      Deep Tendon Reflexes: Reflexes are normal and symmetric. Reflexes normal.   Psychiatric:         Mood and Affect: Mood normal.         Behavior: Behavior normal.         Thought Content: Thought content normal.         Judgment: Judgment normal.         Diagnoses and all orders for this visit:    1. Pancreas cyst (Primary)    2. Nausea    3. Polyp of colon, unspecified part of colon, unspecified type    4. Constipation, unspecified constipation type    5. Gastroesophageal reflux disease, unspecified whether esophagitis present      Assessment:  1. History of colonic adenomas.  Her last colonoscopy was in 12 of 2019.  2.  History of chronic nausea.  Last had an EGD in 12/2019.  3.  She has a history of a small cystic lesion in the uncinate process of the pancreas that on last MRI of the pancreas in 8 of 2020 was unchanged and her CA 19-9 in 10 of 2020 was normal at 9.  4. GERD    Recommendations:  1. Repeat colonoscopy in 3 yrs.   2. She will get labs in two weeks by PCP.   3. Take Omeprazole 40 mg every other day to see if that continues to work.   4. In 8/20 get another MRCP and .   5. F/u 1 yr.     No follow-ups on file.    Thad Brumfield MD  12/16/2020

## 2020-12-18 ENCOUNTER — TELEPHONE (OUTPATIENT)
Dept: GASTROENTEROLOGY | Facility: CLINIC | Age: 75
End: 2020-12-18

## 2020-12-18 NOTE — TELEPHONE ENCOUNTER
----- Message from Thad Brumfield MD sent at 10/11/2020  8:15 PM EDT -----  10/11/20  Tell her that her CA 19-9 (pancreatic tumor marker) came back normal again at 9. 11 mos ago it was 10 and 2 yrs ago it was 11. This is good. FAX to her PCP.  Jorge stein

## 2020-12-18 NOTE — TELEPHONE ENCOUNTER
Lab results reviewed with pt at f/u on 12/16/2020.    Results sent to Dr Herrmann thru [facility].

## 2020-12-22 ENCOUNTER — TELEPHONE (OUTPATIENT)
Dept: INTERNAL MEDICINE | Age: 75
End: 2020-12-22

## 2020-12-22 NOTE — TELEPHONE ENCOUNTER
lvm for pt to call and schedule 1-28 appt for dr underwood with a new provider.  Ok for [facility] to schedule

## 2021-01-15 ENCOUNTER — HOSPITAL ENCOUNTER (OUTPATIENT)
Dept: MRI IMAGING | Facility: HOSPITAL | Age: 76
Discharge: HOME | End: 2021-01-15
Admitting: INTERNAL MEDICINE

## 2021-01-15 DIAGNOSIS — K86.2 PANCREAS CYST: ICD-10-CM

## 2021-01-15 PROCEDURE — 82565 ASSAY OF CREATININE: CPT

## 2021-01-15 PROCEDURE — A9577 INJ MULTIHANCE: HCPCS | Performed by: INTERNAL MEDICINE

## 2021-01-15 PROCEDURE — 74183 MRI ABD W/O CNTR FLWD CNTR: CPT

## 2021-01-15 PROCEDURE — 0 GADOBENATE DIMEGLUMINE 529 MG/ML SOLUTION: Performed by: INTERNAL MEDICINE

## 2021-01-15 RX ADMIN — GADOBENATE DIMEGLUMINE 10 ML: 529 INJECTION, SOLUTION INTRAVENOUS at 13:39

## 2021-01-18 LAB — CREAT BLDA-MCNC: 0.7 MG/DL (ref 0.6–1.3)

## 2021-01-20 ENCOUNTER — TELEPHONE (OUTPATIENT)
Dept: GASTROENTEROLOGY | Facility: CLINIC | Age: 76
End: 2021-01-20

## 2021-01-20 NOTE — TELEPHONE ENCOUNTER
----- Message from Thad Brumfield MD sent at 1/20/2021  7:48 AM EST -----  01/20/21  Tell her that her SIMA of the pancreas/liver shows that the tiny cyst in her pancreas has not changed since 2017, which is good. We can discuss when I see her next in the office. We can discuss whether we should reimage this in the future or not. FAX to her PCP.  Jorge stein

## 2021-01-20 NOTE — PROGRESS NOTES
01/20/21  Tell her that her SIMA of the pancreas/liver shows that the tiny cyst in her pancreas has not changed since 2017, which is good. We can discuss when I see her next in the office. We can discuss whether we should reimage this in the future or not. FAX to her PCP.  Jorge stein

## 2021-01-20 NOTE — TELEPHONE ENCOUNTER
Called pt and advised of Dr Brumfield's note.  Verb understanding.     Results sent to Dr Herrmann thru [facility].

## 2021-02-01 ENCOUNTER — APPOINTMENT (OUTPATIENT)
Dept: WOMENS IMAGING | Facility: HOSPITAL | Age: 76
End: 2021-02-01

## 2021-02-01 ENCOUNTER — OFFICE VISIT (OUTPATIENT)
Dept: INTERNAL MEDICINE | Facility: CLINIC | Age: 76
End: 2021-02-01

## 2021-02-01 DIAGNOSIS — Z78.0 POSTMENOPAUSE: ICD-10-CM

## 2021-02-01 DIAGNOSIS — Z12.31 ENCOUNTER FOR SCREENING MAMMOGRAM FOR MALIGNANT NEOPLASM OF BREAST: ICD-10-CM

## 2021-02-01 DIAGNOSIS — Z13.820 ENCOUNTER FOR SCREENING FOR OSTEOPOROSIS: ICD-10-CM

## 2021-02-01 DIAGNOSIS — Z00.00 MEDICARE ANNUAL WELLNESS VISIT, SUBSEQUENT: ICD-10-CM

## 2021-02-01 PROCEDURE — 77067 SCR MAMMO BI INCL CAD: CPT | Performed by: RADIOLOGY

## 2021-02-01 PROCEDURE — 77080 DXA BONE DENSITY AXIAL: CPT | Performed by: FAMILY MEDICINE

## 2021-02-01 PROCEDURE — 77067 SCR MAMMO BI INCL CAD: CPT | Performed by: FAMILY MEDICINE

## 2021-02-04 ENCOUNTER — OFFICE VISIT (OUTPATIENT)
Dept: INTERNAL MEDICINE | Facility: CLINIC | Age: 76
End: 2021-02-04

## 2021-02-04 DIAGNOSIS — M81.6 LOCALIZED OSTEOPOROSIS WITHOUT CURRENT PATHOLOGICAL FRACTURE: Primary | ICD-10-CM

## 2021-02-04 DIAGNOSIS — E55.9 VITAMIN D DEFICIENCY: ICD-10-CM

## 2021-02-04 DIAGNOSIS — M85.89 OSTEOPENIA OF MULTIPLE SITES: ICD-10-CM

## 2021-02-04 DIAGNOSIS — Z78.0 POSTMENOPAUSE: ICD-10-CM

## 2021-02-04 PROCEDURE — 99443 PR PHYS/QHP TELEPHONE EVALUATION 21-30 MIN: CPT | Performed by: FAMILY MEDICINE

## 2021-02-04 NOTE — PROGRESS NOTES
Chief Complaint  Osteoporosis     You have chosen to receive care through a telephone visit. Do you consent to use a telephone visit for your medical care today? Yes      Subjective          Lona Thomson presents to [facility] for   History of Present Illness     Patient had a DEXA scan performed this month which showed osteoporosis of her right hip and osteopenia of her left hip and spine.  She is not taking a calcium with vitamin D.  She is walking everyday with her dog for exercise.  She has never been on any medication for osteoporosis or osteopenia.  She has not gotten her labs from her Medicare visit as she said things got busy in her life and she had not gotten around to it yet.  She is willing to go ahead and get those done.    Review of Systems   Constitutional: Negative for fatigue and fever.   Respiratory: Negative for shortness of breath and wheezing.    Cardiovascular: Negative for chest pain.   Musculoskeletal: Positive for arthralgias.        Occasional right hip pain         Objective   Vital Signs:   There were no vitals taken for this visit.    Physical Exam N/A  Result Review :   The following data was reviewed by: Ludwin Herrmann MD on 02/04/2021:              Assessment and Plan    Problem List Items Addressed This Visit        Genitourinary and Reproductive     Postmenopause (Chronic)    Relevant Orders    Vitamin D 25 Hydroxy       Musculoskeletal and Injuries    Localized osteoporosis without current pathological fracture - Primary (Chronic)    Osteopenia of multiple sites (Chronic)      Other Visit Diagnoses     Vitamin D deficiency        Relevant Orders    Vitamin D 25 Hydroxy        Interested in taking a bisphosphonate but will need to get the labs completed that were not done from her AWV.  She will get those scheduled and likely will start Fosamax after that.  I recommended that she start Calcium + Vitamin D supp.  Added vitamin D lab with labs  already in system.    I spent 25 minutes caring for Lona on this date of service.  I was on the call for 15 minutes.  This time includes time spent by me in the following activities:preparing for the visit, reviewing tests, obtaining and/or reviewing a separately obtained history, counseling and educating the patient/family/caregiver, ordering medications, tests, or procedures and documenting information in the medical record  Follow Up   Return in about 6 months (around 8/4/2021) for Recheck.  Patient was given instructions and counseling regarding her condition or for health maintenance advice. Please see specific information pulled into the AVS if appropriate.

## 2021-02-04 NOTE — PATIENT INSTRUCTIONS
Osteoporosis    Osteoporosis happens when your bones get thin and weak. This can cause your bones to break (fracture) more easily. You can do things at home to make your bones stronger.  Follow these instructions at home:    Activity  · Exercise as told by your doctor. Ask your doctor what activities are safe for you. You should do:  ? Exercises that make your muscles work to hold your body weight up (weight-bearing exercises). These include clemencia chi, yoga, and walking.  ? Exercises to make your muscles stronger. One example is lifting weights.  Lifestyle  · Limit alcohol intake to no more than 1 drink a day for nonpregnant women and 2 drinks a day for men. One drink equals 12 oz of beer, 5 oz of wine, or 1½ oz of hard liquor.  · Do not use any products that have nicotine or tobacco in them. These include cigarettes and e-cigarettes. If you need help quitting, ask your doctor.  Preventing falls  · Use tools to help you move around (mobility aids) as needed. These include canes, walkers, scooters, and crutches.  · Keep rooms well-lit and free of clutter.  · Put away things that could make you trip. These include cords and rugs.  · Install safety rails on stairs. Install grab bars in bathrooms.  · Use rubber mats in slippery areas, like bathrooms.  · Wear shoes that:  ? Fit you well.  ? Support your feet.  ? Have closed toes.  ? Have rubber soles or low heels.  · Tell your doctor about all of the medicines you are taking. Some medicines can make you more likely to fall.  General instructions  · Eat plenty of calcium and vitamin D. These nutrients are good for your bones. Good sources of calcium and vitamin D include:  ? Some fatty fish, such as salmon and tuna.  ? Foods that have calcium and vitamin D added to them (fortified foods). For example, some breakfast cereals are fortified with calcium and vitamin D.  ? Egg yolks.  ? Cheese.  ? Liver.  · Take over-the-counter and prescription medicines only as told by your  doctor.  · Keep all follow-up visits as told by your doctor. This is important.  Contact a doctor if:  · You have not been tested (screened) for osteoporosis and you are:  ? A woman who is age 65 or older.  ? A man who is age 70 or older.  Get help right away if:  · You fall.  · You get hurt.  Summary  · Osteoporosis happens when your bones get thin and weak.  · Weak bones can break (fracture) more easily.  · Eat plenty of calcium and vitamin D. These nutrients are good for your bones.  · Tell your doctor about all of the medicines that you take.  This information is not intended to replace advice given to you by your health care provider. Make sure you discuss any questions you have with your health care provider.  Document Revised: 11/30/2018 Document Reviewed: 10/12/2018  Elsevier Patient Education © 2020 [employer]vier Inc.

## 2021-02-09 DIAGNOSIS — E55.9 VITAMIN D DEFICIENCY: Primary | ICD-10-CM

## 2021-02-09 LAB
ALBUMIN SERPL-MCNC: 4.4 G/DL (ref 3.5–5.2)
ALBUMIN/GLOB SERPL: 1.9 G/DL
ALP SERPL-CCNC: 109 U/L (ref 39–117)
ALT SERPL-CCNC: 15 U/L (ref 1–33)
AST SERPL-CCNC: 21 U/L (ref 1–32)
BILIRUB SERPL-MCNC: 0.3 MG/DL (ref 0–1.2)
BUN SERPL-MCNC: 13 MG/DL (ref 8–23)
BUN/CREAT SERPL: 18.1 (ref 7–25)
CALCIUM SERPL-MCNC: 10 MG/DL (ref 8.6–10.5)
CHLORIDE SERPL-SCNC: 100 MMOL/L (ref 98–107)
CHOLEST SERPL-MCNC: 275 MG/DL (ref 0–200)
CO2 SERPL-SCNC: 28.7 MMOL/L (ref 22–29)
CREAT SERPL-MCNC: 0.72 MG/DL (ref 0.57–1)
ERYTHROCYTE [DISTWIDTH] IN BLOOD BY AUTOMATED COUNT: 12.6 % (ref 12.3–15.4)
GLOBULIN SER CALC-MCNC: 2.3 GM/DL
GLUCOSE SERPL-MCNC: 97 MG/DL (ref 65–99)
HCT VFR BLD AUTO: 43.6 % (ref 34–46.6)
HCV AB S/CO SERPL IA: <0.1 S/CO RATIO (ref 0–0.9)
HDLC SERPL-MCNC: 71 MG/DL (ref 40–60)
HGB BLD-MCNC: 15 G/DL (ref 12–15.9)
HIV 1+2 AB+HIV1 P24 AG SERPL QL IA: NON REACTIVE
LDLC SERPL CALC-MCNC: 191 MG/DL (ref 0–100)
MCH RBC QN AUTO: 33 PG (ref 26.6–33)
MCHC RBC AUTO-ENTMCNC: 34.4 G/DL (ref 31.5–35.7)
MCV RBC AUTO: 95.8 FL (ref 79–97)
PLATELET # BLD AUTO: 363 10*3/MM3 (ref 140–450)
POTASSIUM SERPL-SCNC: 4.8 MMOL/L (ref 3.5–5.2)
PROT SERPL-MCNC: 6.7 G/DL (ref 6–8.5)
RBC # BLD AUTO: 4.55 10*6/MM3 (ref 3.77–5.28)
SODIUM SERPL-SCNC: 137 MMOL/L (ref 136–145)
TRIGL SERPL-MCNC: 82 MG/DL (ref 0–150)
VLDLC SERPL CALC-MCNC: 13 MG/DL (ref 5–40)
WBC # BLD AUTO: 7.45 10*3/MM3 (ref 3.4–10.8)

## 2021-02-09 RX ORDER — ERGOCALCIFEROL 1.25 MG/1
50000 CAPSULE ORAL WEEKLY
Qty: 12 CAPSULE | Refills: 0 | Status: SHIPPED | OUTPATIENT
Start: 2021-02-09 | End: 2023-01-06 | Stop reason: SDUPTHER

## 2021-02-12 ENCOUNTER — TELEPHONE (OUTPATIENT)
Dept: INTERNAL MEDICINE | Facility: CLINIC | Age: 76
End: 2021-02-12

## 2021-02-24 ENCOUNTER — TELEPHONE (OUTPATIENT)
Dept: INTERNAL MEDICINE | Facility: CLINIC | Age: 76
End: 2021-02-24

## 2021-02-24 NOTE — TELEPHONE ENCOUNTER
Provider: Ludwin Herrmann MD  Caller: Lona Thomson  Relationship to Patient:  (Self)  Pharmacy: [facility]/pharmacy #[id number] - [place], [place] - [address] AT [facility] [phone] [place] [facility] [phone]       Phone Number: [phone] (H)  Reason for Call: PATIENT CALLED AND WOULD LIKE TO HAVE A CALLBACK TO ADVISE HER WHAT SHE CAN TAKE FOR HER HIP PAIN. PLEASE CONTACT PATIENT TO ADVISE.     THANKS

## 2021-02-24 NOTE — TELEPHONE ENCOUNTER
Pt reports right hip pain at night. She has been taking Lopez Aspirin over the counter.     She stated she would like take Aleve as directed and she will be sure to coat her stomach. I told her she should be fine to take it at night for about a week and call back if no better and we can get her in for an appointment.     She stated she understood and thank you.

## 2021-03-03 DIAGNOSIS — Z23 IMMUNIZATION DUE: ICD-10-CM

## 2021-03-26 DIAGNOSIS — R11.0 NAUSEA: ICD-10-CM

## 2021-03-26 RX ORDER — OMEPRAZOLE 40 MG/1
CAPSULE, DELAYED RELEASE ORAL
Qty: 90 CAPSULE | Refills: 3 | Status: SHIPPED | OUTPATIENT
Start: 2021-03-26 | End: 2022-01-17

## 2021-04-29 DIAGNOSIS — E55.9 VITAMIN D DEFICIENCY: ICD-10-CM

## 2021-04-29 RX ORDER — ERGOCALCIFEROL 1.25 MG/1
50000 CAPSULE ORAL WEEKLY
Qty: 12 CAPSULE | Refills: 1 | OUTPATIENT
Start: 2021-04-29

## 2021-04-29 NOTE — TELEPHONE ENCOUNTER
The plan was for her to her to take the course I gave her and then switch to over-the-counter vitamin D3 at 2000 international units/day.  The plan was to recheck her vitamin D around mid August but I do not see an appointment in the computer for her to come in.  That vitamin D check would have been 6 months from the last lab.

## 2021-08-09 ENCOUNTER — OFFICE VISIT (OUTPATIENT)
Dept: INTERNAL MEDICINE | Facility: CLINIC | Age: 76
End: 2021-08-09

## 2021-08-09 VITALS
WEIGHT: 117 LBS | BODY MASS INDEX: 19.49 KG/M2 | HEIGHT: 65 IN | OXYGEN SATURATION: 99 % | SYSTOLIC BLOOD PRESSURE: 123 MMHG | HEART RATE: 70 BPM | DIASTOLIC BLOOD PRESSURE: 60 MMHG | TEMPERATURE: 97.8 F

## 2021-08-09 DIAGNOSIS — M85.89 OSTEOPENIA OF MULTIPLE SITES: ICD-10-CM

## 2021-08-09 DIAGNOSIS — M81.6 LOCALIZED OSTEOPOROSIS WITHOUT CURRENT PATHOLOGICAL FRACTURE: Primary | ICD-10-CM

## 2021-08-09 DIAGNOSIS — E78.01 FAMILIAL HYPERCHOLESTEREMIA: ICD-10-CM

## 2021-08-09 PROCEDURE — 99214 OFFICE O/P EST MOD 30 MIN: CPT | Performed by: FAMILY MEDICINE

## 2021-08-09 RX ORDER — ALENDRONATE SODIUM 70 MG/1
70 TABLET ORAL
Qty: 12 TABLET | Refills: 4 | Status: SHIPPED | OUTPATIENT
Start: 2021-08-09 | End: 2022-09-29

## 2021-08-09 RX ORDER — ATORVASTATIN CALCIUM 20 MG/1
20 TABLET, FILM COATED ORAL NIGHTLY
Qty: 90 TABLET | Refills: 3 | Status: SHIPPED | OUTPATIENT
Start: 2021-08-09 | End: 2022-02-14 | Stop reason: SDUPTHER

## 2021-08-09 NOTE — PATIENT INSTRUCTIONS
"High Cholesterol    High cholesterol is a condition in which the blood has high levels of a white, waxy substance similar to fat (cholesterol). The liver makes all the cholesterol that the body needs. The human body needs small amounts of cholesterol to help build cells. A person gets extra or excess cholesterol from the food that he or she eats.  The blood carries cholesterol from the liver to the rest of the body. If you have high cholesterol, deposits (plaques) may build up on the walls of your arteries. Arteries are the blood vessels that carry blood away from your heart. These plaques make the arteries narrow and stiff.  Cholesterol plaques increase your risk for heart attack and stroke. Work with your health care provider to keep your cholesterol levels in a healthy range.  What increases the risk?  The following factors may make you more likely to develop this condition:  · Eating foods that are high in animal fat (saturated fat) or cholesterol.  · Being overweight.  · Not getting enough exercise.  · A family history of high cholesterol (familial hypercholesterolemia).  · Use of tobacco products.  · Having diabetes.  What are the signs or symptoms?  There are no symptoms of this condition.  How is this diagnosed?  This condition may be diagnosed based on the results of a blood test.  · If you are older than 20 years of age, your health care provider may check your cholesterol levels every 4-6 years.  · You may be checked more often if you have high cholesterol or other risk factors for heart disease.  The blood test for cholesterol measures:  · \"Bad\" cholesterol, or LDL cholesterol. This is the main type of cholesterol that causes heart disease. The desired level is less than 100 mg/dL.  · \"Good\" cholesterol, or HDL cholesterol. HDL helps protect against heart disease by cleaning the arteries and carrying the LDL to the liver for processing. The desired level for HDL is 60 mg/dL or higher.  · Triglycerides. " These are fats that your body can store or burn for energy. The desired level is less than 150 mg/dL.  · Total cholesterol. This measures the total amount of cholesterol in your blood and includes LDL, HDL, and triglycerides. The desired level is less than 200 mg/dL.  How is this treated?  This condition may be treated with:  · Diet changes. You may be asked to eat foods that have more fiber and less saturated fats or added sugar.  · Lifestyle changes. These may include regular exercise, maintaining a healthy weight, and quitting use of tobacco products.  · Medicines. These are given when diet and lifestyle changes have not worked. You may be prescribed a statin medicine to help lower your cholesterol levels.  Follow these instructions at home:  Eating and drinking    · Eat a healthy, balanced diet. This diet includes:  ? Daily servings of a variety of fresh, frozen, or canned fruits and vegetables.  ? Daily servings of whole grain foods that are rich in fiber.  ? Foods that are low in saturated fats and trans fats. These include poultry and fish without skin, lean cuts of meat, and low-fat dairy products.  ? A variety of fish, especially oily fish that contain omega-3 fatty acids. Aim to eat fish at least 2 times a week.  · Avoid foods and drinks that have added sugar.  · Use healthy cooking methods, such as roasting, grilling, broiling, baking, poaching, steaming, and stir-frying. Do not campos your food except for stir-frying.  Lifestyle    · Get regular exercise. Aim to exercise for a total of 150 minutes a week. Increase your activity level by doing activities such as gardening, walking, and taking the stairs.  · Do not use any products that contain nicotine or tobacco, such as cigarettes, e-cigarettes, and chewing tobacco. If you need help quitting, ask your health care provider.  General instructions  · Take over-the-counter and prescription medicines only as told by your health care provider.  · Keep all  "follow-up visits as told by your health care provider. This is important.  Where to find more information  · American Heart Association: www.heart.org  · National Heart, Lung, and Blood [place]: www.nhlbi.nih.gov  Contact a health care provider if:  · You have trouble achieving or maintaining a healthy diet or weight.  · You are starting an exercise program.  · You are unable to stop smoking.  Get help right away if:  · You have chest pain.  · You have trouble breathing.  · You have any symptoms of a stroke. \"BE FAST\" is an easy way to remember the main warning signs of a stroke:  ? B - Balance. Signs are dizziness, sudden trouble walking, or loss of balance.  ? E - Eyes. Signs are trouble seeing or a sudden change in vision.  ? F - Face. Signs are sudden weakness or numbness of the face, or the face or eyelid drooping on one side.  ? A - Arms. Signs are weakness or numbness in an arm. This happens suddenly and usually on one side of the body.  ? S - Speech. Signs are sudden trouble speaking, slurred speech, or trouble understanding what people say.  ? T - Time. Time to call emergency services. Write down what time symptoms started.  · You have other signs of a stroke, such as:  ? A sudden, severe headache with no known cause.  ? Nausea or vomiting.  ? Seizure.  These symptoms may represent a serious problem that is an emergency. Do not wait to see if the symptoms will go away. Get medical help right away. Call your local emergency services (911 in the [place] Do not drive yourself to the hospital.  Summary  · Cholesterol plaques increase your risk for heart attack and stroke. Work with your health care provider to keep your cholesterol levels in a healthy range.  · Eat a healthy, balanced diet, get regular exercise, and maintain a healthy weight.  · Do not use any products that contain nicotine or tobacco, such as cigarettes, e-cigarettes, and chewing tobacco.  · Get help right away if you have any symptoms of a " stroke.  This information is not intended to replace advice given to you by your health care provider. Make sure you discuss any questions you have with your health care provider.  Document Revised: 11/16/2020 Document Reviewed: 11/16/2020  Elsevier Patient Education © 2021 Elsevier Inc.

## 2021-08-09 NOTE — PROGRESS NOTES
"Chief Complaint  Follow-up (6 month follow up )    Subjective          Lona Thomson presents to [facility]  History of Present Illness     Patient here today to discuss her bone density results.  She had a bone density test completed in February 2021 which showed right hip osteoporosis and left hip and spine with osteopenia.  She was started on calcium with vitamin D and a vitamin D was tested showing a deficiency and she was started on the 50,000 units/day to be transitioned to a lower dose later.  She was offered bisphosphonate therapy at that time but it does not look like it was started.    She is taking Vitamin D3.  She is taking her calcium and exercising.  She rides horses.      Hyperlipidemia runs in her family -last cholesterol shows total cholesterol 275, .  She says she has a strong family history of cholesterol problems.  Not currently on any cholesterol medication.    Objective   Vital Signs:   /60 (BP Location: Left arm, Patient Position: Sitting, Cuff Size: Adult)   Pulse 70   Temp 97.8 °F (36.6 °C)   Ht 165.1 cm (65\")   Wt 53.1 kg (117 lb)   SpO2 99%   BMI 19.47 kg/m²     Physical Exam  Vitals and nursing note reviewed.   Constitutional:       General: She is not in acute distress.     Appearance: Normal appearance.   Cardiovascular:      Rate and Rhythm: Normal rate and regular rhythm.      Heart sounds: Normal heart sounds. No murmur heard.     Pulmonary:      Effort: Pulmonary effort is normal.      Breath sounds: Normal breath sounds.   Neurological:      Mental Status: She is alert.        Result Review :{Labs  Result Review  Imaging  Med Tab  Media  Procedures :23}   The following data was reviewed by: Ludwin Herrmann MD on 08/09/2021:  Common labs    Common Labsle 1/15/21 2/8/21 2/8/21 2/8/21     0826 0826 0826   Glucose   97    BUN   13    Creatinine 0.70  0.72    eGFR Non  Am   79    eGFR African Am   96    Sodium   137  "   Potassium   4.8    Chloride   100    Calcium   10.0    Total Protein   6.7    Albumin   4.40    Total Bilirubin   0.3    Alkaline Phosphatase   109    AST (SGOT)   21    ALT (SGPT)   15    WBC  7.45     Hemoglobin  15.0     Hematocrit  43.6     Platelets  363     Total Cholesterol    275 (A)   Triglycerides    82   HDL Cholesterol    71 (A)   LDL Cholesterol     191 (A)   (A) Abnormal value       Comments are available for some flowsheets but are not being displayed.                     Assessment and Plan    Diagnoses and all orders for this visit:    1. Localized osteoporosis without current pathological fracture (Primary)  -     alendronate (FOSAMAX) 70 MG tablet; Take 1 tablet by mouth Every 7 (Seven) Days.  Dispense: 12 tablet; Refill: 4  -     CBC (No Diff); Future  -     Comprehensive Metabolic Panel; Future    2. Osteopenia of multiple sites  -     alendronate (FOSAMAX) 70 MG tablet; Take 1 tablet by mouth Every 7 (Seven) Days.  Dispense: 12 tablet; Refill: 4  -     CBC (No Diff); Future  -     Comprehensive Metabolic Panel; Future    3. Familial hypercholesteremia  -     atorvastatin (LIPITOR) 20 MG tablet; Take 1 tablet by mouth Every Night.  Dispense: 90 tablet; Refill: 3  -     CBC (No Diff); Future  -     Comprehensive Metabolic Panel; Future  -     Lipid Panel; Future      Will start alendronate once a week along with the vitamin D and calcium for the osteoporosis, osteopenia.  We will start atorvastatin 20 mg for the cholesterol issues.  Plan to recheck these labs 1 week prior to next appointment.        Follow Up   Return in about 27 weeks (around 2/14/2022) for Recheck - osteporosis and cholesterol.  Patient was given instructions and counseling regarding her condition or for health maintenance advice. Please see specific information pulled into the AVS if appropriate.

## 2021-08-23 ENCOUNTER — TELEPHONE (OUTPATIENT)
Dept: INTERNAL MEDICINE | Facility: CLINIC | Age: 76
End: 2021-08-23

## 2021-08-23 NOTE — TELEPHONE ENCOUNTER
Discussed with patient. Advised her to give a little time to adjust. If continues after 7 days to let us know. Patient verbalized understanding.

## 2021-08-23 NOTE — TELEPHONE ENCOUNTER
Caller: Lona Thomson    Relationship: Self    Best call back number:[phone]      What medications are you currently taking: atorvastatin (LIPITOR) 20 MG tablet     When did you start taking these medications: 08/20/21    Which medication are you concerned about:     Who prescribed you this medication:     What are your concerns: PATIENT CALLING STATING SHE IS EXPERIENCING NAUSEA WITH WITH THIS MEDICATION AND WOULD TO SEE IF THERE IS ANOTHER MEDICATION SHE COULD TRY.    How long have you had these concerns: STARTED Sunday WITH THE NAUSEA

## 2021-08-30 ENCOUNTER — TELEPHONE (OUTPATIENT)
Dept: INTERNAL MEDICINE | Facility: CLINIC | Age: 76
End: 2021-08-30

## 2021-08-30 NOTE — TELEPHONE ENCOUNTER
I called the pt and she is feeling much better-she said she just started taking a statin and read up on it and it was noted as a rare side effect (trouble lifting arms and trouble standing) she is going to try cutting her dose in half to see if that helps.

## 2021-08-30 NOTE — TELEPHONE ENCOUNTER
Caller: Lona Thomson EVAN    Relationship: Self    Best call back number: [phone]    What is the best time to reach you: ANY    Who are you requesting to speak with (clinical staff, provider,  specific staff member): REQUESTED TO SPEAK WITH MA    Do you know the name of the person who called: SERJIO LUQUE    What was the call regarding: PATIENT CALLED STATED THAT SHE WALKS 3 1/2 MILES PER DAY.  PATIENT STATED SHE IS VERY CONCERNED THIS MORNING SHE WAS NOT ABLE TO COMPLETE HER REGULAR WALK.  PATIENT STATED THAT SHE FELT AS IF SHE WERE DRUNK AS SHE WALKED.  SHE STOPPED AND THEN WHEN SHE STARTED TO WALK AGAIN SHE STAGGERED SOME.  PATIENT STATED THAT IT WAS AS IF SHE COULD NOT GET HER LEGS TO MOVE FORWARD AND IT WAS A VERY SLOW PACE AND NORMALLY SHE HAS A FASTER STRIDE WHEN WALKING.    Do you require a callback: YES

## 2021-08-30 NOTE — TELEPHONE ENCOUNTER
If she is not improved by now, have her go to the ER.  If she is improving and stable, she can follow-up with me or someone this week.

## 2021-09-09 ENCOUNTER — APPOINTMENT (OUTPATIENT)
Dept: CT IMAGING | Facility: HOSPITAL | Age: 76
End: 2021-09-09

## 2021-09-09 ENCOUNTER — APPOINTMENT (OUTPATIENT)
Dept: GENERAL RADIOLOGY | Facility: HOSPITAL | Age: 76
End: 2021-09-09

## 2021-09-09 ENCOUNTER — HOSPITAL ENCOUNTER (OUTPATIENT)
Facility: HOSPITAL | Age: 76
Setting detail: OBSERVATION
Discharge: HOME | End: 2021-09-11
Attending: EMERGENCY MEDICINE | Admitting: STUDENT IN AN ORGANIZED HEALTH CARE EDUCATION/TRAINING PROGRAM
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
## 2021-09-09 DIAGNOSIS — S22.41XA CLOSED FRACTURE OF MULTIPLE RIBS OF RIGHT SIDE, INITIAL ENCOUNTER: Primary | ICD-10-CM

## 2021-09-09 DIAGNOSIS — S42.101A CLOSED FRACTURE OF RIGHT SCAPULA, UNSPECIFIED PART OF SCAPULA, INITIAL ENCOUNTER: ICD-10-CM

## 2021-09-09 PROBLEM — R26.81 UNSTEADINESS: Status: ACTIVE | Noted: 2021-09-09

## 2021-09-09 LAB
ALBUMIN SERPL-MCNC: 4.8 G/DL (ref 3.5–5.2)
ALBUMIN/GLOB SERPL: 2 G/DL
ALP SERPL-CCNC: 145 U/L (ref 39–117)
ALT SERPL W P-5'-P-CCNC: 24 U/L (ref 1–33)
ANION GAP SERPL CALCULATED.3IONS-SCNC: 12.1 MMOL/L (ref 5–15)
AST SERPL-CCNC: 17 U/L (ref 1–32)
BASOPHILS # BLD AUTO: 0.05 10*3/MM3 (ref 0–0.2)
BASOPHILS NFR BLD AUTO: 0.4 % (ref 0–1.5)
BILIRUB SERPL-MCNC: 0.2 MG/DL (ref 0–1.2)
BUN SERPL-MCNC: 14 MG/DL (ref 8–23)
BUN/CREAT SERPL: 18.9 (ref 7–25)
CALCIUM SPEC-SCNC: 10.5 MG/DL (ref 8.6–10.5)
CHLORIDE SERPL-SCNC: 101 MMOL/L (ref 98–107)
CO2 SERPL-SCNC: 25.9 MMOL/L (ref 22–29)
CREAT SERPL-MCNC: 0.74 MG/DL (ref 0.57–1)
DEPRECATED RDW RBC AUTO: 45.5 FL (ref 37–54)
EOSINOPHIL # BLD AUTO: 0.03 10*3/MM3 (ref 0–0.4)
EOSINOPHIL NFR BLD AUTO: 0.2 % (ref 0.3–6.2)
ERYTHROCYTE [DISTWIDTH] IN BLOOD BY AUTOMATED COUNT: 12.6 % (ref 12.3–15.4)
GFR SERPL CREATININE-BSD FRML MDRD: 77 ML/MIN/1.73
GLOBULIN UR ELPH-MCNC: 2.4 GM/DL
GLUCOSE SERPL-MCNC: 134 MG/DL (ref 65–99)
HCT VFR BLD AUTO: 48.3 % (ref 34–46.6)
HGB BLD-MCNC: 16 G/DL (ref 12–15.9)
IMM GRANULOCYTES # BLD AUTO: 0.06 10*3/MM3 (ref 0–0.05)
IMM GRANULOCYTES NFR BLD AUTO: 0.5 % (ref 0–0.5)
LYMPHOCYTES # BLD AUTO: 1.86 10*3/MM3 (ref 0.7–3.1)
LYMPHOCYTES NFR BLD AUTO: 14 % (ref 19.6–45.3)
MCH RBC QN AUTO: 32.3 PG (ref 26.6–33)
MCHC RBC AUTO-ENTMCNC: 33.1 G/DL (ref 31.5–35.7)
MCV RBC AUTO: 97.4 FL (ref 79–97)
MONOCYTES # BLD AUTO: 1.36 10*3/MM3 (ref 0.1–0.9)
MONOCYTES NFR BLD AUTO: 10.2 % (ref 5–12)
NEUTROPHILS NFR BLD AUTO: 74.7 % (ref 42.7–76)
NEUTROPHILS NFR BLD AUTO: 9.91 10*3/MM3 (ref 1.7–7)
NRBC BLD AUTO-RTO: 0 /100 WBC (ref 0–0.2)
PLATELET # BLD AUTO: 395 10*3/MM3 (ref 140–450)
PMV BLD AUTO: 8.5 FL (ref 6–12)
POTASSIUM SERPL-SCNC: 4.3 MMOL/L (ref 3.5–5.2)
PROT SERPL-MCNC: 7.2 G/DL (ref 6–8.5)
RBC # BLD AUTO: 4.96 10*6/MM3 (ref 3.77–5.28)
SARS-COV-2 ORF1AB RESP QL NAA+PROBE: NOT DETECTED
SODIUM SERPL-SCNC: 139 MMOL/L (ref 136–145)
WBC # BLD AUTO: 13.27 10*3/MM3 (ref 3.4–10.8)

## 2021-09-09 PROCEDURE — G0378 HOSPITAL OBSERVATION PER HR: HCPCS

## 2021-09-09 PROCEDURE — 73010 X-RAY EXAM OF SHOULDER BLADE: CPT

## 2021-09-09 PROCEDURE — 73030 X-RAY EXAM OF SHOULDER: CPT

## 2021-09-09 PROCEDURE — 96374 THER/PROPH/DIAG INJ IV PUSH: CPT

## 2021-09-09 PROCEDURE — 76376 3D RENDER W/INTRP POSTPROCES: CPT

## 2021-09-09 PROCEDURE — U0005 INFEC AGEN DETEC AMPLI PROBE: HCPCS | Performed by: NURSE PRACTITIONER

## 2021-09-09 PROCEDURE — 96375 TX/PRO/DX INJ NEW DRUG ADDON: CPT

## 2021-09-09 PROCEDURE — 71260 CT THORAX DX C+: CPT

## 2021-09-09 PROCEDURE — 85025 COMPLETE CBC W/AUTO DIFF WBC: CPT | Performed by: NURSE PRACTITIONER

## 2021-09-09 PROCEDURE — C9803 HOPD COVID-19 SPEC COLLECT: HCPCS

## 2021-09-09 PROCEDURE — 99285 EMERGENCY DEPT VISIT HI MDM: CPT

## 2021-09-09 PROCEDURE — 25010000002 IOPAMIDOL 61 % SOLUTION: Performed by: EMERGENCY MEDICINE

## 2021-09-09 PROCEDURE — 25010000002 MORPHINE PER 10 MG: Performed by: NURSE PRACTITIONER

## 2021-09-09 PROCEDURE — U0004 COV-19 TEST NON-CDC HGH THRU: HCPCS | Performed by: NURSE PRACTITIONER

## 2021-09-09 PROCEDURE — 80053 COMPREHEN METABOLIC PANEL: CPT | Performed by: NURSE PRACTITIONER

## 2021-09-09 PROCEDURE — 25010000002 ONDANSETRON PER 1 MG: Performed by: NURSE PRACTITIONER

## 2021-09-09 RX ORDER — LORAZEPAM 0.5 MG/1
0.5 TABLET ORAL 2 TIMES DAILY PRN
Status: DISCONTINUED | OUTPATIENT
Start: 2021-09-09 | End: 2021-09-11 | Stop reason: HOSPADM

## 2021-09-09 RX ORDER — PROCHLORPERAZINE MALEATE 5 MG/1
5 TABLET ORAL EVERY 6 HOURS PRN
Status: DISCONTINUED | OUTPATIENT
Start: 2021-09-09 | End: 2021-09-11 | Stop reason: HOSPADM

## 2021-09-09 RX ORDER — SODIUM CHLORIDE 0.9 % (FLUSH) 0.9 %
10 SYRINGE (ML) INJECTION EVERY 12 HOURS SCHEDULED
Status: DISCONTINUED | OUTPATIENT
Start: 2021-09-09 | End: 2021-09-11 | Stop reason: HOSPADM

## 2021-09-09 RX ORDER — HYDROCODONE BITARTRATE AND ACETAMINOPHEN 7.5; 325 MG/1; MG/1
1 TABLET ORAL ONCE
Status: COMPLETED | OUTPATIENT
Start: 2021-09-09 | End: 2021-09-09

## 2021-09-09 RX ORDER — ACETAMINOPHEN 325 MG/1
650 TABLET ORAL EVERY 4 HOURS PRN
Status: DISCONTINUED | OUTPATIENT
Start: 2021-09-09 | End: 2021-09-11

## 2021-09-09 RX ORDER — BUPROPION HYDROCHLORIDE 150 MG/1
150 TABLET, EXTENDED RELEASE ORAL EVERY 12 HOURS SCHEDULED
Status: DISCONTINUED | OUTPATIENT
Start: 2021-09-09 | End: 2021-09-11 | Stop reason: HOSPADM

## 2021-09-09 RX ORDER — MORPHINE SULFATE 2 MG/ML
4 INJECTION, SOLUTION INTRAMUSCULAR; INTRAVENOUS EVERY 4 HOURS PRN
Status: DISCONTINUED | OUTPATIENT
Start: 2021-09-09 | End: 2021-09-11 | Stop reason: HOSPADM

## 2021-09-09 RX ORDER — HYDROCODONE BITARTRATE AND ACETAMINOPHEN 7.5; 325 MG/1; MG/1
1 TABLET ORAL EVERY 4 HOURS PRN
Status: DISCONTINUED | OUTPATIENT
Start: 2021-09-09 | End: 2021-09-11

## 2021-09-09 RX ORDER — ACETAMINOPHEN 650 MG/1
650 SUPPOSITORY RECTAL EVERY 4 HOURS PRN
Status: DISCONTINUED | OUTPATIENT
Start: 2021-09-09 | End: 2021-09-11

## 2021-09-09 RX ORDER — ONDANSETRON 2 MG/ML
4 INJECTION INTRAMUSCULAR; INTRAVENOUS ONCE
Status: COMPLETED | OUTPATIENT
Start: 2021-09-09 | End: 2021-09-09

## 2021-09-09 RX ORDER — SODIUM CHLORIDE 0.9 % (FLUSH) 0.9 %
10 SYRINGE (ML) INJECTION AS NEEDED
Status: DISCONTINUED | OUTPATIENT
Start: 2021-09-09 | End: 2021-09-11 | Stop reason: HOSPADM

## 2021-09-09 RX ORDER — ACETAMINOPHEN 160 MG/5ML
650 SOLUTION ORAL EVERY 4 HOURS PRN
Status: DISCONTINUED | OUTPATIENT
Start: 2021-09-09 | End: 2021-09-11

## 2021-09-09 RX ORDER — PANTOPRAZOLE SODIUM 40 MG/1
40 TABLET, DELAYED RELEASE ORAL EVERY MORNING
Status: DISCONTINUED | OUTPATIENT
Start: 2021-09-10 | End: 2021-09-09

## 2021-09-09 RX ORDER — CALCIUM POLYCARBOPHIL 625 MG
1250 TABLET ORAL DAILY
Status: DISCONTINUED | OUTPATIENT
Start: 2021-09-10 | End: 2021-09-11 | Stop reason: HOSPADM

## 2021-09-09 RX ORDER — ERGOCALCIFEROL 1.25 MG/1
50000 CAPSULE ORAL WEEKLY
Status: DISCONTINUED | OUTPATIENT
Start: 2021-09-13 | End: 2021-09-11 | Stop reason: HOSPADM

## 2021-09-09 RX ORDER — MORPHINE SULFATE 2 MG/ML
4 INJECTION, SOLUTION INTRAMUSCULAR; INTRAVENOUS ONCE
Status: COMPLETED | OUTPATIENT
Start: 2021-09-09 | End: 2021-09-09

## 2021-09-09 RX ORDER — PANTOPRAZOLE SODIUM 40 MG/1
40 TABLET, DELAYED RELEASE ORAL
Status: DISCONTINUED | OUTPATIENT
Start: 2021-09-10 | End: 2021-09-11 | Stop reason: HOSPADM

## 2021-09-09 RX ORDER — ONDANSETRON 2 MG/ML
4 INJECTION INTRAMUSCULAR; INTRAVENOUS EVERY 6 HOURS PRN
Status: DISCONTINUED | OUTPATIENT
Start: 2021-09-09 | End: 2021-09-11 | Stop reason: HOSPADM

## 2021-09-09 RX ORDER — NITROGLYCERIN 0.4 MG/1
0.4 TABLET SUBLINGUAL
Status: DISCONTINUED | OUTPATIENT
Start: 2021-09-09 | End: 2021-09-11 | Stop reason: HOSPADM

## 2021-09-09 RX ADMIN — MORPHINE SULFATE 4 MG: 2 INJECTION, SOLUTION INTRAMUSCULAR; INTRAVENOUS at 17:54

## 2021-09-09 RX ADMIN — ONDANSETRON 4 MG: 2 INJECTION INTRAMUSCULAR; INTRAVENOUS at 17:53

## 2021-09-09 RX ADMIN — IOPAMIDOL 85 ML: 612 INJECTION, SOLUTION INTRAVENOUS at 15:25

## 2021-09-09 RX ADMIN — HYDROCODONE BITARTRATE AND ACETAMINOPHEN 1 TABLET: 7.5; 325 TABLET ORAL at 14:00

## 2021-09-09 NOTE — ED PROVIDER NOTES
Brief history of present illness: 75-year-old female complains of right scapular area discomfort status post fall.  Patient notes that she was at home she turned quickly and fell backwards yesterday.  She is right-hand dominant.  She denies any head injury, neck pain, focal numbness or weakness. she denies any dyspnea at this time.    Physical exam:   ED Triage Vitals   Temp Heart Rate Resp BP SpO2   09/09/21 1240 09/09/21 1240 09/09/21 1240 09/09/21 1240 09/09/21 1240   98.9 °F (37.2 °C) 93 16 149/89 99 %      Temp src Heart Rate Source Patient Position BP Location FiO2 (%)   09/09/21 1240 09/09/21 1240 09/09/21 1325 09/09/21 1325 --   Tympanic Monitor Sitting Left arm      Alert appropriate.  No acute distress.  Speaks in full sentences with no tachypnea or increased work of breathing.  Patient is splinting her right upper extremity slightly.  Pink warm and well-perfused throughout.  Atraumatic calvarial exam with painless range of motion the neck noted.    MDM:  XR Shoulder 2+ View Right, XR Scapula Right    Result Date: 9/9/2021  Narrative: PROCEDURE:  XR SCAPULA RIGHT-, XR SHOULDER 2+ VW RIGHT-  HISTORY: Pain status post fall last night.  COMPARISON: Chest radiograph 02/15/2018  FINDINGS:   2 views of the right shoulder and 2 views of the right scapula were obtained.  There is an acute mildly displaced likely transverse fracture through the lower aspect of the scapula with anterior displacement of the distal fracture fragment. There are acute mildly displaced fractures of lateral right ribs 4, 5, and 6. Joint spaces are preserved.       Impression:  Acute right scapular and right rib 4, 5, and 6 fractures, as above.   This report was finalized on 9/9/2021 2:09 PM by Dr. Salome Suazo M.D.      Elderly female with greater than 2 rib fractures, plan CT chest, and likely admission for respiratory toilet and thoracic surgery consultation.    I have seen and personally evaluated this patient, discussed the case with  the treating advanced practice provider, and reviewed their note. I was involved in the medical decision making during the evaluation, testing and disposition planning for this patient.     Axel Headley MD  09/09/21 [id number]

## 2021-09-09 NOTE — ED NOTES
"Pt reports starting taking a statin drug 2 weeks ago, and reports made her \"stagger like she is drunk\" when walking, reports PCP cut dosage in half. Pt reports she was walking out of bathroom last night approx 9 pm and felt like she \"had no control of legs\" and staggered and fell back and hit R shoulder on toilet. Pt denies taking blood thinners. Denies hitting head/LOC. Reports constant, dull pain in R shoulder. Can move R arm, cannot abduct R arm out to side. No bruising noted.     Rachel Devries, RN  09/09/21 7398    "

## 2021-09-09 NOTE — H&P
Internal medicine history and physical  INTERNAL MEDICINE   [facility]       Patient Identification:  Name: Lona Thomson  Age: 75 y.o.  Sex: female  :  1945  MRN: [id number]                   Primary Care Physician: Ludwin Herrmann MD                               Date of admission:2021    Chief Complaint: Pain and discomfort in right shoulder and right chest after a fall in the bathroom last night.    History of Present Illness:   Patient is a 75-year-old female with past medical history as noted below including as per review database documentation about concern for cervical myelopathy and unsteady gait was in her usual state of health until last night when while walking out of the bathroom she lost control of her legs and fell and landed on the toilet.  Since then she has been healing complaining of discomfort in her right back and shoulder blade area which has gotten worse resulting in her being brought to the emergency room for further evaluation.  Patient attributes her lower extremity weakness and unsteadiness to the introduction of Lipitor which was started a month or 2 ago and for the last 2 weeks has been noticing her legs are not working.  Patient be her dose of the medication was cut in half with no improvement in symptoms.  Patient denies any loss of continence or numbness and tingling in her arms and legs.  Work-up in the emergency room revealed multiple right-sided for fifth and sixth rib fractures and commuted fracture of the inferior aspect of the scapula with no pneumothorax or pulmonary contusion.  Patient is appropriate and ambulatory in the room and does not appear to be otherwise complaining of any symptoms.  Because of her fractures we are asked admit the patient.      Past Medical History:  Past Medical History:   Diagnosis Date   • Bipolar affect, depressed (CMS/HCC)    • Cervical myelopathy (CMS/HCC)    • Colon polyp    • Depression    • Gastritis    •  GERD (gastroesophageal reflux disease)    • H/O colonoscopy with polypectomy    • Migraine headache    • Pancreatic cyst     4 cm     Past Surgical History:  Past Surgical History:   Procedure Laterality Date   • COLONOSCOPY  05/2013   • COLONOSCOPY N/A 10/31/2016    polyps, tics, IH, tubular adenoma w/low grade dysplasia x 2, hyperplastic polyp x3   • COLONOSCOPY N/A 12/9/2019    Procedure: COLONOSCOPY  WITH COLD BIOPSIES AND COLD SNARE POLYPECTOMY;  Surgeon: Thad Brumfield MD;  Location: [facility] ENDOSCOPY;  Service: Gastroenterology   • ENDOSCOPY  11/2013   • ENDOSCOPY N/A 10/31/2016    erythematous mucosa in stomach   • ENDOSCOPY N/A 12/9/2019    Procedure: ESOPHAGOGASTRODUODENOSCOPYEITH COLD BIOPSIES;  Surgeon: Thad Brumfield MD;  Location: [facility] ENDOSCOPY;  Service: Gastroenterology   • TONSILLECTOMY     • UPPER GASTROINTESTINAL ENDOSCOPY        Home Meds:  (Not in a hospital admission)    Current Meds:   No current facility-administered medications for this encounter.    Current Outpatient Medications:   •  alendronate (FOSAMAX) 70 MG tablet, Take 1 tablet by mouth Every 7 (Seven) Days., Disp: 12 tablet, Rfl: 4  •  atorvastatin (LIPITOR) 20 MG tablet, Take 1 tablet by mouth Every Night., Disp: 90 tablet, Rfl: 3  •  buPROPion SR (WELLBUTRIN SR) 150 MG 12 hr tablet, Take 150 mg by mouth 2 (Two) Times a Day., Disp: , Rfl:   •  Calcium Polycarbophil (FIBER-CAPS PO), Take 2 capsules by mouth Daily., Disp: , Rfl:   •  lamoTRIgine (LaMICtal) 100 MG tablet, Take 1.5 tablets by mouth 2 (Two) Times a Day., Disp: , Rfl:   •  LORazepam (ATIVAN) 0.5 MG tablet, Take 0.5 mg by mouth 2 (Two) Times a Day As Needed., Disp: , Rfl: 2  •  omeprazole (priLOSEC) 40 MG capsule, TAKE 1 CAPSULE BY MOUTH EVERY DAY, Disp: 90 capsule, Rfl: 3  •  prochlorperazine (COMPAZINE) 5 MG tablet, TAKE 1 TABLET BY MOUTH EVERY 6 (SIX) HOURS AS NEEDED FOR NAUSEA OR VOMITING., Disp: 60 tablet, Rfl: 2  •  vitamin D (ERGOCALCIFEROL) 1.25 MG (93017 UT)  "capsule capsule, Take 1 capsule by mouth 1 (One) Time Per Week., Disp: 12 capsule, Rfl: 0  •  Vortioxetine HBr (TRINTELLIX PO), Take 1 tablet by mouth Daily. 15 mg per day, Disp: , Rfl:   Allergies:  Allergies   Allergen Reactions   • Escitalopram Nausea Only     nausea   • Lexapro [Escitalopram Oxalate] GI Intolerance     nausea   • Prozac [Fluoxetine Hcl] Nausea Only     Social History:   Social History     Tobacco Use   • Smoking status: Former Smoker     Packs/day: 0.25     Years: 12.00     Pack years: 3.00     Types: Cigarettes     Quit date: 2019     Years since quittin.7   • Smokeless tobacco: Never Used   • Tobacco comment: We discussed nicotine patch, gum, hypnosis.   Substance Use Topics   • Alcohol use: Yes     Comment: social      Family History:  Family History   Problem Relation Age of Onset   • Stroke Mother    • Cancer Father    • Cancer Maternal Grandmother    • Stroke Maternal Grandmother           Review of Systems  See history of present illness and past medical history.    As described in the history of presenting illness    Vitals:   /89   Pulse 81   Temp 98.9 °F (37.2 °C) (Tympanic)   Resp 18   Ht 165.1 cm (65\")   Wt 52.2 kg (115 lb)   SpO2 98%   BMI 19.14 kg/m²   I/O: No intake or output data in the 24 hours ending 21 1805  Exam:  Patient is examined using the personal protective equipment as per guidelines from infection control for this particular patient as enacted.  Hand washing was performed before and after patient interaction.  General Appearance:    Alert, cooperative, no distress, appears stated age, ambulating in the room.   Head:    Normocephalic, without obvious abnormality, atraumatic   Eyes:    PERRL, conjunctiva/corneas clear, EOM's intact, both eyes   Ears:    Normal external ear canals, both ears   Nose:   Nares normal, septum midline, mucosa normal, no drainage    or sinus tenderness   Throat:   Lips, tongue, gums normal; oral mucosa pink and moist "   Neck:   Supple, symmetrical, trachea midline, no adenopathy;     thyroid:  no enlargement/tenderness/nodules; no carotid    bruit or JVD   Back:    Tenderness in the right scapular area   Lungs:     Clear to auscultation bilaterally, respirations unlabored   Chest Wall:    No tenderness or deformity    Heart:    Regular rate and rhythm, S1 and S2 normal, no murmur, rub   or gallop   Abdomen:     Soft, non-tender, bowel sounds active all four quadrants,    Extremities:  Preserved range of motion of the right shoulder right lower extremity she has a very peculiar contusion and skin laceration which she described as a bite did not make sense.  Patient does not recall injuring her right leg.   Pulses:   Pulses palpable in all extremities; symmetric all extremities   Skin:  Changes in the right lower extremity as described above noted.   Neurologic:   CNII-XII intact, motor strength grossly intact, sensation grossly intact to light touch, no focal deficits noted       Data Review:      I reviewed the patient's new clinical results.  Results from last 7 days   Lab Units 09/09/21  1441   WBC 10*3/mm3 13.27*   HEMOGLOBIN g/dL 16.0*   PLATELETS 10*3/mm3 395     Results from last 7 days   Lab Units 09/09/21  1441   SODIUM mmol/L 139   POTASSIUM mmol/L 4.3   CHLORIDE mmol/L 101   CO2 mmol/L 25.9   BUN mg/dL 14   CREATININE mg/dL 0.74   CALCIUM mg/dL 10.5   GLUCOSE mg/dL 134*     Microbiology Results (last 10 days)     Procedure Component Value - Date/Time    COVID PRE-OP / PRE-PROCEDURE SCREENING ORDER (NO ISOLATION) - Swab, Nasopharynx [[id number]]  (Normal) Collected: 09/09/21 1442    Lab Status: Final result Specimen: Swab from Nasopharynx Updated: 09/09/21 2020    Narrative:      The following orders were created for panel order COVID PRE-OP / PRE-PROCEDURE SCREENING ORDER (NO ISOLATION) - Swab, Nasopharynx.  Procedure                               Abnormality         Status                     ---------                                -----------         ------                     COVID-19,APTIMA PANTHER(...[[id number]]  Normal              Final result                 Please view results for these tests on the individual orders.    COVID-19,APTIMA PANTHER(DERRICK),[facility], NP/OP SWAB IN UTM/VTM/SALINE TRANSPORT MEDIA,24 HR TAT - Swab, Nasopharynx [[id number]]  (Normal) Collected: 09/09/21 1442    Lab Status: Final result Specimen: Swab from Nasopharynx Updated: 09/09/21 2020     COVID19 Not Detected    Narrative:      Fact sheet for providers: https://www.fda.gov/media/922528/download     Fact sheet for patients: https://www.fda.gov/media/775492/download    Test performed by RT PCR.        XR Scapula Right    Result Date: 9/9/2021   Acute right scapular and right rib 4, 5, and 6 fractures, as above.   This report was finalized on 9/9/2021 2:09 PM by Dr. Salome Suazo M.D.      XR Shoulder 2+ View Right    Result Date: 9/9/2021   Acute right scapular and right rib 4, 5, and 6 fractures, as above.   This report was finalized on 9/9/2021 2:09 PM by Dr. Salome Suazo M.D.      CT Chest With Contrast Diagnostic    Result Date: 9/9/2021  1. Acute right lateral 4th, 5th, and 6th rib fractures as described. Comminuted fracture at the inferior aspect of the scapula. 2. There is no pneumothorax or evidence for pulmonary contusion. There is either atelectatic and/or scarring at the medial aspect of the right lung base.  Discussed with CRYSTAL Casper.        No orders to display         Assessment:  Active Hospital Problems    Diagnosis  POA   • **Multiple closed fractures of ribs of right side [S22.41XA]  Yes   • Closed fracture of right scapula [S42.101A]  Unknown   • Fall [W19.XXXA]  Unknown   • Unsteadiness [R26.81]  Unknown   • Gastroesophageal reflux disease [K21.9]  Yes     Added automatically from request for surgery [id number]     • Pancreatic cyst [K86.2]  Yes     Added automatically from request for surgery [id number]     • Bipolar  disorder (CMS/HCC) [F31.9]  Yes     Dr Hampton- [facility]     • Fatigue [R53.83]  Yes   • Cervical myelopathy (CMS/HCC) [G95.9]  Yes       Medical decision making:  Right  chest wall and subscapular pain following a fall-likely due to closed nondisplaced fractures involving multiple ribs on the right side as well and inferior portion of the right scapula with no obvious functional compromise or evidence of lung injury or pneumothorax.  Plan is to provide her with symptomatic relief and monitor her clinical course with occupational therapy and physical therapy evaluation.  Weakness of the lower extremities and unsteadiness and fall-Based on review of the data I think cervical spine myelopathy needs to be ruled out.  Provide him with fall precautions and get MRI of the C-spine and based on that information decide whether he needs any neurosurgery consultation symptomatic management with fall precautions is all she needs.  Right lower extremity contusion and scabbed laceration-etiology unclear  Leukocytosis likely demargination-monitor.  Bipolar disorder-continue her current regimen and monitor.      Negra Hensley MD   9/9/2021  18:05 EDT  Much of this encounter note is an electronic transcription/translation of spoken language to printed text. The electronic translation of spoken language may permit erroneous, or at times, nonsensical words or phrases to be inadvertently transcribed; Although I have reviewed the note for such errors, some may still exist

## 2021-09-09 NOTE — ED PROVIDER NOTES
EMERGENCY DEPARTMENT ENCOUNTER    Room Number:  B02/02  Date of encounter:  9/9/2021  PCP: Ludwin Herrmann MD  Historian: Patient        PPE  Patient was placed in face mask in first look. Patient was wearing facemask when I entered the room and throughout our encounter. I wore full protective equipment throughout this patient encounter including a face mask, and gloves. Hand hygiene was performed before donning protective equipment and after removal when leaving the room.          HPI:  Chief Complaint: Fall  A complete HPI/ROS/PMH/PSH/SH/FH are unobtainable due to: Nothing    Context: Lona Thomson is a 75 y.o. female who arrives to the ED via private vehicle.  Patient presents with c/o constant, moderate, achy right shoulder pain s/p fall last night.   Patient also complains of right scapula pain.  Patient states she had gone in the bathroom to look at her new medicine cabinet when she lost her balance and fell.  She states that her right shoulder hit the toilet.  She states she was able to get up on her own.  States she was recently started on Lipitor and is felt a little off balance since then and is talked to her PMD about this.  Patient denies hitting her head, LOC, neck pain, back pain, lower extremity injury.  Patient states that nothing makes the symptoms better and movement worsens symptoms.  Patient is right-hand dominant.        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   • Pancreatic cyst 11/04/2019   • Polyp of colon 11/04/2019   • Constipation 11/04/2019   • Gastroesophageal reflux disease 11/04/2019   • Localized osteoporosis without current pathological fracture 02/04/2021   • Osteopenia of multiple sites 02/04/2021   • Postmenopause 02/04/2021   • Familial hypercholesteremia 08/09/2021     Resolved Ambulatory Problems     Diagnosis Date Noted   • No Resolved Ambulatory Problems     Past Medical History:   Diagnosis Date   • Bipolar affect, depressed (CMS/HCC)    • Colon polyp    • Depression    • Gastritis    • GERD (gastroesophageal reflux disease)    • H/O colonoscopy with polypectomy    • Migraine headache          PAST SURGICAL HISTORY  Past Surgical History:   Procedure Laterality Date   • COLONOSCOPY  05/2013   • COLONOSCOPY N/A 10/31/2016    polyps, tics, IH, tubular adenoma w/low grade dysplasia x 2, hyperplastic polyp x3   • COLONOSCOPY N/A 12/9/2019    Procedure: COLONOSCOPY  WITH COLD BIOPSIES AND COLD SNARE POLYPECTOMY;  Surgeon: Thad Brumfield MD;  Location: [facility] ENDOSCOPY;  Service: Gastroenterology   • ENDOSCOPY  11/2013   • ENDOSCOPY N/A 10/31/2016    erythematous mucosa in stomach   • ENDOSCOPY N/A 12/9/2019    Procedure: ESOPHAGOGASTRODUODENOSCOPYEITH COLD BIOPSIES;  Surgeon: Thad Brumfield MD;  Location: [facility] ENDOSCOPY;  Service: Gastroenterology   • TONSILLECTOMY     • UPPER GASTROINTESTINAL ENDOSCOPY           FAMILY HISTORY  Family History   Problem Relation Age of Onset   • Stroke Mother    • Cancer Father    • Cancer Maternal Grandmother    • Stroke Maternal Grandmother          SOCIAL HISTORY  Social History     Socioeconomic History   • Marital status: Single     Spouse name: Not on file   • Number of children: 0   • Years of education: Not on file   • Highest education level: Not on file   Tobacco Use   • Smoking status: Former Smoker     Packs/day: 0.25     Years: 12.00     Pack years: 3.00     Types: Cigarettes     Quit  date: 2019     Years since quittin.7   • Smokeless tobacco: Never Used   • Tobacco comment: We discussed nicotine patch, gum, hypnosis.   Substance and Sexual Activity   • Alcohol use: Yes     Comment: social   • Drug use: Never   • Sexual activity: Defer         ALLERGIES  Escitalopram, Lexapro [escitalopram oxalate], and Prozac [fluoxetine hcl]        REVIEW OF SYSTEMS  Review of Systems     All systems reviewed and negative except for those discussed in HPI.        PHYSICAL EXAM    ED Triage Vitals   Temp Heart Rate Resp BP SpO2   21 1240 21 1240 21 1240 21 1240 21 1240   98.9 °F (37.2 °C) 93 16 149/89 99 %       Physical Exam  Musculoskeletal:        Arms:       Comments: Patient is able to take affected hand and touch opposite shoulder with minimal pain.  She has a 2+ radial pulse on the right, no other tenderness to palpation in the right arm, soft compartments to the right forearm.       GENERAL: Well appearing, non-toxic appearing, not distressed  HENT: normocephalic, atraumatic  EYES: no scleral icterus, PERRL  CV: regular rhythm, regular rate, no murmur  RESPIRATORY: normal effort, CTAB  ABDOMEN: soft   MUSCULOSKELETAL: no deformity  No cervical, thoracic or lumbar vertebral tenderness to palpation  No step off or crepitus noted      NEURO: alert, moves all extremities, follows commands, mental status normal/baseline  SKIN: warm, dry, no rash   Psych: Appropriate mood and affect  Nursing notes and vital signs reviewed      LAB RESULTS  Recent Results (from the past 24 hour(s))   Comprehensive Metabolic Panel    Collection Time: 21  2:41 PM    Specimen: Blood   Result Value Ref Range    Glucose 134 (H) 65 - 99 mg/dL    BUN 14 8 - 23 mg/dL    Creatinine 0.74 0.57 - 1.00 mg/dL    Sodium 139 136 - 145 mmol/L    Potassium 4.3 3.5 - 5.2 mmol/L    Chloride 101 98 - 107 mmol/L    CO2 25.9 22.0 - 29.0 mmol/L    Calcium 10.5 8.6 - 10.5 mg/dL    Total Protein 7.2 6.0 - 8.5  g/dL    Albumin 4.80 3.50 - 5.20 g/dL    ALT (SGPT) 24 1 - 33 U/L    AST (SGOT) 17 1 - 32 U/L    Alkaline Phosphatase 145 (H) 39 - 117 U/L    Total Bilirubin 0.2 0.0 - 1.2 mg/dL    eGFR Non African Amer 77 >60 mL/min/1.73    Globulin 2.4 gm/dL    A/G Ratio 2.0 g/dL    BUN/Creatinine Ratio 18.9 7.0 - 25.0    Anion Gap 12.1 5.0 - 15.0 mmol/L   CBC Auto Differential    Collection Time: 09/09/21  2:41 PM    Specimen: Blood   Result Value Ref Range    WBC 13.27 (H) 3.40 - 10.80 10*3/mm3    RBC 4.96 3.77 - 5.28 10*6/mm3    Hemoglobin 16.0 (H) 12.0 - 15.9 g/dL    Hematocrit 48.3 (H) 34.0 - 46.6 %    MCV 97.4 (H) 79.0 - 97.0 fL    MCH 32.3 26.6 - 33.0 pg    MCHC 33.1 31.5 - 35.7 g/dL    RDW 12.6 12.3 - 15.4 %    RDW-SD 45.5 37.0 - 54.0 fl    MPV 8.5 6.0 - 12.0 fL    Platelets 395 140 - 450 10*3/mm3    Neutrophil % 74.7 42.7 - 76.0 %    Lymphocyte % 14.0 (L) 19.6 - 45.3 %    Monocyte % 10.2 5.0 - 12.0 %    Eosinophil % 0.2 (L) 0.3 - 6.2 %    Basophil % 0.4 0.0 - 1.5 %    Immature Grans % 0.5 0.0 - 0.5 %    Neutrophils, Absolute 9.91 (H) 1.70 - 7.00 10*3/mm3    Lymphocytes, Absolute 1.86 0.70 - 3.10 10*3/mm3    Monocytes, Absolute 1.36 (H) 0.10 - 0.90 10*3/mm3    Eosinophils, Absolute 0.03 0.00 - 0.40 10*3/mm3    Basophils, Absolute 0.05 0.00 - 0.20 10*3/mm3    Immature Grans, Absolute 0.06 (H) 0.00 - 0.05 10*3/mm3    nRBC 0.0 0.0 - 0.2 /100 WBC       Ordered the above labs and independently reviewed the results.      RADIOLOGY  XR Shoulder 2+ View Right, XR Scapula Right    Result Date: 9/9/2021  PROCEDURE:  XR SCAPULA RIGHT-, XR SHOULDER 2+ VW RIGHT-  HISTORY: Pain status post fall last night.  COMPARISON: Chest radiograph 02/15/2018  FINDINGS:   2 views of the right shoulder and 2 views of the right scapula were obtained.  There is an acute mildly displaced likely transverse fracture through the lower aspect of the scapula with anterior displacement of the distal fracture fragment. There are acute mildly displaced  fractures of lateral right ribs 4, 5, and 6. Joint spaces are preserved.        Acute right scapular and right rib 4, 5, and 6 fractures, as above.   This report was finalized on 9/9/2021 2:09 PM by Dr. Salome Suazo M.D.      CT Chest With Contrast Diagnostic    Result Date: 9/9/2021  CT CHEST WITH IV CONTRAST  HISTORY: 75-year-old female status post fall. Right rib and scapula fractures.  TECHNIQUE: Radiation dose reduction techniques were utilized, including automated exposure control and exposure modulation based on body size. 3 mm images were obtained through the chest after the administration of IV contrast. Compared with radiographs performed earlier today.  FINDINGS: There are acute fractures at the lateral aspect of the right 4th, 5th, and 6th ribs. The most prominent internal displacement is 3 mm at the 6th rib fracture. There is no pneumothorax. There is a comminuted fracture at the inferior aspect of the scapular body. Glenoid is intact.  There is atelectatic change or scarring at the medial aspect of the right lung base. There are no acute appearing airspace opacities and there are no pleural or pericardial effusions. There is no lymphadenopathy within the chest. There is 3.4 cm ectasia of the ascending thoracic aorta. The caliber tapers to 2.7 cm at the aortic arch. There is no evidence for a thoracic aortic dissection.      1. Acute right lateral 4th, 5th, and 6th rib fractures as described. Comminuted fracture at the inferior aspect of the scapula. 2. There is no pneumothorax or evidence for pulmonary contusion. There is either atelectatic and/or scarring at the medial aspect of the right lung base.  Discussed with CRYSTAL Casper.          I ordered the above noted radiological studies and viewed the images on the PACS system.           MEDICAL RECORD REVIEW  No relevant medical records to review in epic      PROCEDURES    Procedures        DIFFERENTIAL DIAGNOSIS  Differential Diagnosis for  Upper Extremity Problem/Injury include but are not limited to the following:    Contusion of the shoulder/arm/elbow/forearm/wrist/hand/digits  Dislocation of the shoulder/elbow/wrist/digits  Sprains of the shoulder/elbow/wrist/hand/digits  Fractures (open/closed) of the shoulder, humerus, radius, ulna, hand or digits  Laceration or abrasions        PROGRESS, DATA ANALYSIS, CONSULTS, AND MEDICAL DECISION MAKING        ED Course as of Sep 09 1646   Thu Sep 09, 2021   1344 Discussed pertinent information from history and physical exam with patient.  Discussed differential diagnosis and plan for ED evaluation/work-up and treatment including x-rays of the right shoulder and scapula, pain control.  All questions answered.  Patient is agreeable with this plan.        [MS]   1420 Reviewed pt's history and workup with Dr. Headley.  After a bedside evaluation, they agree with the plan of care.          [MS]   1420 Patient updated on multiple rib fractures and right scapular fracture seen on the plain films.  Discussed with patient that we should obtain a CT of the chest to evaluate for further rib fractures or other abnormalities.  Patient verbalized understanding and is agreeable to the plan.    [MS]   1453 WBC(!): 13.27 [MS]   1541  Discussed with Dr. Campbell regarding the CT chest results which revealed right lateral rib fractures 4, 5 and 6 and a comminuted fracture of the inferior scapula, no pneumothorax or other abnormality seen.  See dictation for official radiology interpretation.        [MS]   1639 Consult Note    Discussed care with Dr Hensley  Reviewed patient's history, exam, results and need for admission secondary to notable rib fractures and scapular fracture  Dr. Hensley accepts the patient to be admitted to telemetry observation bed.        [MS]      ED Course User Index  [MS] Kristine Jefferson, APRN     ADMISSION    Discussed treatment plan and reason for admission with pt/family and admitting physician.   Pt/family voiced understanding of the plan for admission for further testing/treatment as needed.      DIAGNOSIS  Final diagnoses:   Closed fracture of multiple ribs of right side, initial encounter   Closed fracture of right scapula, unspecified part of scapula, initial encounter   Fall, initial encounter           MEDICATIONS GIVEN IN ED    Medications   HYDROcodone-acetaminophen (NORCO) 7.5-325 MG per tablet 1 tablet (1 tablet Oral Given 9/9/21 1400)   iopamidol (ISOVUE-300) 61 % injection 100 mL (85 mL Intravenous Given by Other 9/9/21 1525)           COURSE & MEDICAL DECISION MAKING  Any/All labs and Any/All Imaging studies that were ordered were reviewed and are noted above.  Results were reviewed/discussed with the patient and they were also made aware of online access.    Pt also made aware that some labs, such as cultures, will not be resulted during ER visit and follow up with PMD is necessary.        Kristine Jefferson, APRN  09/09/21 [id number]

## 2021-09-09 NOTE — ED NOTES
pt to ER via PV from home c/o fall in the bathroom last night having right shoulder pain. pt states was recently placed on statins and that has made her feel off balance. Pt igor in triage  Triage in appropriate PPE     Pennie Lopez RN  09/09/21 6352

## 2021-09-10 ENCOUNTER — APPOINTMENT (OUTPATIENT)
Dept: MRI IMAGING | Facility: HOSPITAL | Age: 76
End: 2021-09-10

## 2021-09-10 LAB
ALBUMIN SERPL-MCNC: 4 G/DL (ref 3.5–5.2)
ALBUMIN/GLOB SERPL: 1.7 G/DL
ALP SERPL-CCNC: 115 U/L (ref 39–117)
ALT SERPL W P-5'-P-CCNC: 18 U/L (ref 1–33)
ANION GAP SERPL CALCULATED.3IONS-SCNC: 10.6 MMOL/L (ref 5–15)
AST SERPL-CCNC: 11 U/L (ref 1–32)
BASOPHILS # BLD AUTO: 0.05 10*3/MM3 (ref 0–0.2)
BASOPHILS NFR BLD AUTO: 0.5 % (ref 0–1.5)
BILIRUB SERPL-MCNC: 0.3 MG/DL (ref 0–1.2)
BUN SERPL-MCNC: 14 MG/DL (ref 8–23)
BUN/CREAT SERPL: 18.9 (ref 7–25)
CALCIUM SPEC-SCNC: 9.4 MG/DL (ref 8.6–10.5)
CHLORIDE SERPL-SCNC: 104 MMOL/L (ref 98–107)
CO2 SERPL-SCNC: 28.4 MMOL/L (ref 22–29)
CREAT SERPL-MCNC: 0.74 MG/DL (ref 0.57–1)
DEPRECATED RDW RBC AUTO: 44.1 FL (ref 37–54)
EOSINOPHIL # BLD AUTO: 0.11 10*3/MM3 (ref 0–0.4)
EOSINOPHIL NFR BLD AUTO: 1.2 % (ref 0.3–6.2)
ERYTHROCYTE [DISTWIDTH] IN BLOOD BY AUTOMATED COUNT: 12.1 % (ref 12.3–15.4)
GFR SERPL CREATININE-BSD FRML MDRD: 77 ML/MIN/1.73
GLOBULIN UR ELPH-MCNC: 2.3 GM/DL
GLUCOSE SERPL-MCNC: 105 MG/DL (ref 65–99)
HCT VFR BLD AUTO: 45.3 % (ref 34–46.6)
HGB BLD-MCNC: 14.7 G/DL (ref 12–15.9)
IMM GRANULOCYTES # BLD AUTO: 0.03 10*3/MM3 (ref 0–0.05)
IMM GRANULOCYTES NFR BLD AUTO: 0.3 % (ref 0–0.5)
LYMPHOCYTES # BLD AUTO: 1.81 10*3/MM3 (ref 0.7–3.1)
LYMPHOCYTES NFR BLD AUTO: 19.7 % (ref 19.6–45.3)
MCH RBC QN AUTO: 31.9 PG (ref 26.6–33)
MCHC RBC AUTO-ENTMCNC: 32.5 G/DL (ref 31.5–35.7)
MCV RBC AUTO: 98.3 FL (ref 79–97)
MONOCYTES # BLD AUTO: 0.91 10*3/MM3 (ref 0.1–0.9)
MONOCYTES NFR BLD AUTO: 9.9 % (ref 5–12)
NEUTROPHILS NFR BLD AUTO: 6.26 10*3/MM3 (ref 1.7–7)
NEUTROPHILS NFR BLD AUTO: 68.4 % (ref 42.7–76)
NRBC BLD AUTO-RTO: 0 /100 WBC (ref 0–0.2)
PLATELET # BLD AUTO: 322 10*3/MM3 (ref 140–450)
PMV BLD AUTO: 8.8 FL (ref 6–12)
POTASSIUM SERPL-SCNC: 4.7 MMOL/L (ref 3.5–5.2)
PROT SERPL-MCNC: 6.3 G/DL (ref 6–8.5)
RBC # BLD AUTO: 4.61 10*6/MM3 (ref 3.77–5.28)
SODIUM SERPL-SCNC: 143 MMOL/L (ref 136–145)
WBC # BLD AUTO: 9.17 10*3/MM3 (ref 3.4–10.8)

## 2021-09-10 PROCEDURE — 25010000002 MORPHINE PER 10 MG: Performed by: INTERNAL MEDICINE

## 2021-09-10 PROCEDURE — 97161 PT EVAL LOW COMPLEX 20 MIN: CPT | Performed by: PHYSICAL THERAPIST

## 2021-09-10 PROCEDURE — G0378 HOSPITAL OBSERVATION PER HR: HCPCS

## 2021-09-10 PROCEDURE — 72156 MRI NECK SPINE W/O & W/DYE: CPT

## 2021-09-10 PROCEDURE — 96376 TX/PRO/DX INJ SAME DRUG ADON: CPT

## 2021-09-10 PROCEDURE — 0 GADOBENATE DIMEGLUMINE 529 MG/ML SOLUTION: Performed by: STUDENT IN AN ORGANIZED HEALTH CARE EDUCATION/TRAINING PROGRAM

## 2021-09-10 PROCEDURE — 85025 COMPLETE CBC W/AUTO DIFF WBC: CPT | Performed by: INTERNAL MEDICINE

## 2021-09-10 PROCEDURE — A9577 INJ MULTIHANCE: HCPCS | Performed by: STUDENT IN AN ORGANIZED HEALTH CARE EDUCATION/TRAINING PROGRAM

## 2021-09-10 PROCEDURE — 80053 COMPREHEN METABOLIC PANEL: CPT | Performed by: INTERNAL MEDICINE

## 2021-09-10 PROCEDURE — 36415 COLL VENOUS BLD VENIPUNCTURE: CPT | Performed by: INTERNAL MEDICINE

## 2021-09-10 RX ADMIN — CALCIUM POLYCARBOPHIL 1250 MG: 625 TABLET, FILM COATED ORAL at 08:54

## 2021-09-10 RX ADMIN — HYDROCODONE BITARTRATE AND ACETAMINOPHEN 1 TABLET: 7.5; 325 TABLET ORAL at 12:20

## 2021-09-10 RX ADMIN — MORPHINE SULFATE 4 MG: 2 INJECTION, SOLUTION INTRAMUSCULAR; INTRAVENOUS at 06:33

## 2021-09-10 RX ADMIN — VORTIOXETINE 15 MG: 5 TABLET, FILM COATED ORAL at 01:57

## 2021-09-10 RX ADMIN — HYDROCODONE BITARTRATE AND ACETAMINOPHEN 1 TABLET: 7.5; 325 TABLET ORAL at 18:26

## 2021-09-10 RX ADMIN — MORPHINE SULFATE 4 MG: 2 INJECTION, SOLUTION INTRAMUSCULAR; INTRAVENOUS at 22:51

## 2021-09-10 RX ADMIN — LAMOTRIGINE 150 MG: 100 TABLET ORAL at 01:58

## 2021-09-10 RX ADMIN — GADOBENATE DIMEGLUMINE 10 ML: 529 INJECTION, SOLUTION INTRAVENOUS at 22:22

## 2021-09-10 RX ADMIN — LAMOTRIGINE 150 MG: 100 TABLET ORAL at 08:54

## 2021-09-10 RX ADMIN — SODIUM CHLORIDE, PRESERVATIVE FREE 10 ML: 5 INJECTION INTRAVENOUS at 08:55

## 2021-09-10 RX ADMIN — VORTIOXETINE 15 MG: 5 TABLET, FILM COATED ORAL at 21:00

## 2021-09-10 RX ADMIN — BUPROPION HYDROCHLORIDE 150 MG: 150 TABLET, EXTENDED RELEASE ORAL at 01:57

## 2021-09-10 RX ADMIN — LAMOTRIGINE 150 MG: 100 TABLET ORAL at 20:59

## 2021-09-10 RX ADMIN — SODIUM CHLORIDE, PRESERVATIVE FREE 10 ML: 5 INJECTION INTRAVENOUS at 21:01

## 2021-09-10 RX ADMIN — MORPHINE SULFATE 4 MG: 2 INJECTION, SOLUTION INTRAMUSCULAR; INTRAVENOUS at 00:01

## 2021-09-10 RX ADMIN — BUPROPION HYDROCHLORIDE 150 MG: 150 TABLET, EXTENDED RELEASE ORAL at 21:00

## 2021-09-10 RX ADMIN — PANTOPRAZOLE SODIUM 40 MG: 40 TABLET, DELAYED RELEASE ORAL at 06:34

## 2021-09-10 RX ADMIN — HYDROCODONE BITARTRATE AND ACETAMINOPHEN 1 TABLET: 7.5; 325 TABLET ORAL at 01:52

## 2021-09-10 NOTE — PLAN OF CARE
Goal Outcome Evaluation:  Plan of Care Reviewed With: patient   Progress: improving  Outcome Summary: Patient admitted to [facility] for fall at home. Patient A&O x4, RA, SR on monitor. x1 assistance to move around in room and to BR. Bed alarm active. Patient C/o pain x3 pain medication given. MRI schedule ,IS at bedside and education given with teach back provided by patient . Will continue to monitor.

## 2021-09-10 NOTE — CASE MANAGEMENT/SOCIAL WORK
Discharge Planning Assessment  [facility]     Patient Name: Lona Thomson  MRN: [id number]  Today's Date: 9/10/2021    Admit Date: 9/9/2021    Discharge Needs Assessment     Row Name 09/10/21 9530       Living Environment    Lives With  alone    Current Living Arrangements  home/apartment/condo    Primary Care Provided by  self    Provides Primary Care For  no one, unable/limited ability to care for self    Family Caregiver if Needed  sibling(s)    Family Caregiver Names  sister Suyapa Thomson [phone]    Quality of Family Relationships  helpful;involved;supportive    Able to Return to Prior Arrangements  no       Resource/Environmental Concerns    Resource/Environmental Concerns  none       Transition Planning    Patient/Family Anticipates Transition to  home    Patient/Family Anticipated Services at Transition  none    Transportation Anticipated  other (see comments) Cab       Discharge Needs Assessment    Equipment Currently Used at Home  none    Concerns to be Addressed  denies needs/concerns at this time    Anticipated Changes Related to Illness  none    Equipment Needed After Discharge  none    Current Discharge Risk  physical impairment        Discharge Plan     Row Name 09/10/21 3740       Plan    Plan  Plans home; denies needs.    Provided Post Acute Provider List?  N/A    N/A Provider List Comment  The patient was not provided with a  HH/SNF list nor a print out of the HH/nursing home compare list from Medicare.gov as the patient currently denies any HH/SNF needs.    Provided Post Acute Provider Quality & Resource List?  N/A    N/A Quality & Resource List Comment  The patient was not provided with a  HH/SNF list nor a print out of the HH/nursing home compare list from Medicare.gov as the patient currently denies any HH/SNF needs.    Patient/Family in Agreement with Plan  yes    Plan Comments  Spoke to the patient; explained role of CCP, verified facesheet, discussed Lindsay notice and discussed  discharge planning needs. The patient resides alone in a 7th floor apartment and her sister Suyapa Thomson [phone] resides 2 floors below her and can assist her at home as needed.  The patient’s PCP is Ludwin Herrmann, pharmacy is [facility] [address] in the [place] and she denies any trouble remembering to take her medication or with affording her medication. The patient states that she walks 3 miles per day. The patient denies any HH/SNF history, denies any POA documents, states that she drives herself to appointments and will need a LYFT or cab ride home upon d/c.  Cab voucher placed in envelope in patient’s cubby.  The patient was not provided with a  HH/SNF list nor a print out of the HH/nursing home compare list from Medicare.gov as the patient currently denies any HH/SNF needs.  ALBERT Abreu        Continued Care and Services - Admitted Since 9/9/2021    Coordination has not been started for this encounter.         Demographic Summary     Row Name 09/10/21 9098       General Information    Admission Type  observation    Arrived From  home    Referral Source  admission list    Reason for Consult  discharge planning    Preferred Language  English     Used During This Interaction  no        Functional Status     Row Name 09/10/21 1730       Functional Status    Usual Activity Tolerance  good    Current Activity Tolerance  moderate       Functional Status, IADL    Medications  independent    Meal Preparation  independent    Housekeeping  independent    Laundry  independent    Shopping  independent       Mental Status    General Appearance WDL  WDL       Mental Status Summary    Recent Changes in Mental Status/Cognitive Functioning  no changes        Psychosocial    No documentation.       Abuse/Neglect    No documentation.       Legal    No documentation.       Substance Abuse    No documentation.       Patient Forms    No documentation.           ALBERT Watkins

## 2021-09-10 NOTE — PROGRESS NOTES
Name: Lona Thomson ADMIT: 2021   : 1945  PCP: Ludwin Herrmann MD    MRN: [id number] LOS: 0 days   AGE/SEX: 75 y.o. female  ROOM: [facility]     Subjective   Subjective   No events overnight. MRI of the c-spine is pending. Pt reports some right sided soreness, but she is generally upbeat.     Objective   Objective   Vital Signs  Temp:  [98.3 °F (36.8 °C)-99.4 °F (37.4 °C)] 99.4 °F (37.4 °C)  Heart Rate:  [66-81] 76  Resp:  [16-18] 16  BP: (124-159)/(58-97) 124/59  SpO2:  [93 %-98 %] 96 %  on   ;   Device (Oxygen Therapy): room air  Body mass index is 19.07 kg/m².  Physical Exam  Constitutional:       General: She is not in acute distress.  Cardiovascular:      Rate and Rhythm: Normal rate and regular rhythm.      Heart sounds: Normal heart sounds.   Pulmonary:      Effort: Pulmonary effort is normal.      Breath sounds: Normal breath sounds.   Abdominal:      General: Bowel sounds are normal.      Palpations: Abdomen is soft.   Musculoskeletal:         General: No tenderness.      Right lower leg: No edema.      Left lower leg: No edema.   Neurological:      Mental Status: She is alert.      Cranial Nerves: No cranial nerve deficit.      Sensory: No sensory deficit.      Motor: No weakness.      Coordination: Coordination normal.   Psychiatric:         Mood and Affect: Mood normal.         Behavior: Behavior normal.         Results Review     I reviewed the patient's new clinical results.  Results from last 7 days   Lab Units 09/10/21  0330 21  1441   WBC 10*3/mm3 9.17 13.27*   HEMOGLOBIN g/dL 14.7 16.0*   PLATELETS 10*3/mm3 322 395     Results from last 7 days   Lab Units 09/10/21  0330 21  1441   SODIUM mmol/L 143 139   POTASSIUM mmol/L 4.7 4.3   CHLORIDE mmol/L 104 101   CO2 mmol/L 28.4 25.9   BUN mg/dL 14 14   CREATININE mg/dL 0.74 0.74   GLUCOSE mg/dL 105* 134*   Estimated Creatinine Clearance: 51.4 mL/min (by C-G formula based on SCr of 0.74 mg/dL).  Results from last 7 days   Lab  Units 09/10/21  0330 09/09/21  1441   ALBUMIN g/dL 4.00 4.80   BILIRUBIN mg/dL 0.3 0.2   ALK PHOS U/L 115 145*   AST (SGOT) U/L 11 17   ALT (SGPT) U/L 18 24     Results from last 7 days   Lab Units 09/10/21  0330 09/09/21  1441   CALCIUM mg/dL 9.4 10.5   ALBUMIN g/dL 4.00 4.80       COVID19   Date Value Ref Range Status   09/09/2021 Not Detected Not Detected - Ref. Range Final     No results found for: HGBA1C, POCGLU    CT Chest With Contrast Diagnostic  Narrative: CT CHEST WITH IV CONTRAST     HISTORY: 75-year-old female status post fall. Right rib and scapula  fractures.     TECHNIQUE: Radiation dose reduction techniques were utilized, including  automated exposure control and exposure modulation based on body size.   3 mm images were obtained through the chest after the administration of  IV contrast. Compared with radiographs performed earlier today.     FINDINGS: There are acute fractures at the lateral aspect of the right  4th, 5th, and 6th ribs. The most prominent internal displacement is 3 mm  at the 6th rib fracture. There is no pneumothorax. There is a comminuted  fracture at the inferior aspect of the scapular body. Glenoid is intact.     There is atelectatic change or scarring at the medial aspect of the  right lung base. There are no acute appearing airspace opacities and  there are no pleural or pericardial effusions. There is no  lymphadenopathy within the chest. There is 3.4 cm ectasia of the  ascending thoracic aorta. The caliber tapers to 2.7 cm at the aortic  arch. There is no evidence for a thoracic aortic dissection.     Impression: 1. Acute right lateral 4th, 5th, and 6th rib fractures as described.  Comminuted fracture at the inferior aspect of the scapula.  2. There is no pneumothorax or evidence for pulmonary contusion. There  is either atelectatic and/or scarring at the medial aspect of the right  lung base.     Discussed with CRYSTAL Casper.     This report was finalized on  9/10/2021 7:01 AM by Dr. Jazz Campbell M.D.       Scheduled Medications  buPROPion SR, 150 mg, Oral, Q12H  [START ON 9/11/2021] influenza vaccine, 0.5 mL, Intramuscular, Once  lamoTRIgine, 150 mg, Oral, Q12H  pantoprazole, 40 mg, Oral, QAM AC  calcium polycarbophil, 1,250 mg, Oral, Daily  sodium chloride, 10 mL, Intravenous, Q12H  [START ON 9/13/2021] vitamin D, 50,000 Units, Oral, Weekly  Vortioxetine HBr, 15 mg, Oral, Daily    Infusions   Diet  Diet Regular; Cardiac       Assessment/Plan     Active Hospital Problems    Diagnosis  POA   • **Multiple closed fractures of ribs of right side [S22.41XA]  Yes   • Closed fracture of right scapula [S42.101A]  Unknown   • Fall [W19.XXXA]  Unknown   • Unsteadiness [R26.81]  Unknown   • Gastroesophageal reflux disease [K21.9]  Yes   • Pancreatic cyst [K86.2]  Yes   • Bipolar disorder (CMS/HCC) [F31.9]  Yes   • Fatigue [R53.83]  Yes   • Cervical myelopathy (CMS/HCC) [G95.9]  Yes      Resolved Hospital Problems   No resolved problems to display.       75 y.o. female admitted with Multiple closed fractures of ribs of right side.    · Largely nondisplaced right sided rib fractures and right inferior scapular comminuted fracture-we will ask thoracic surgery to see her for recommendations on activity.  I do not think that she would be a candidate for a surgical procedure since her pain is mostly controlled as these are minimally displaced.  · weakness of lower extremities with unsteadiness and fall-I do not detect weakness on my exam, but I will follow up the cervical MRI as ordered by Dr. Hensley.  · Right lower extremity contusion and scabbed laceration of unclear etiology  · Leukocytosis-reactive. Improved spontaneously without intervention  · Bipolar disorder  · GERD  · Osteoporosis  · SCDs for DVT prophylaxis.  · Full code.  · Discussed with patient and nursing staff.  · Anticipate discharge home tomorrow. if okay with thoracic surgery      Abel Alarcon MD  [facility]  09/10/21  15:37 EDT    I wore protective equipment throughout this patient encounter including a face mask, gloves and protective eyewear.  Hand hygiene was performed before donning protective equipment and after removal when leaving the room.

## 2021-09-10 NOTE — THERAPY EVALUATION
Patient Name: Lona Thomson  : 1945    MRN: [id number]                              Today's Date: 9/10/2021       Admit Date: 2021    Visit Dx:     ICD-10-CM ICD-9-CM   1. Closed fracture of multiple ribs of right side, initial encounter  S22.41XA 807.09   2. Closed fracture of right scapula, unspecified part of scapula, initial encounter  S42.101A 811.00   3. 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, unspecified   • Pancreatic cyst   • Polyp of colon   • Constipation   • Gastroesophageal reflux disease   • Localized osteoporosis without current pathological fracture   • Osteopenia of multiple sites   • Postmenopause   • Familial hypercholesteremia   • Multiple closed fractures of ribs of right side   • Closed fracture of right scapula   • Fall   • Unsteadiness     Past Medical History:   Diagnosis Date   • Bipolar affect, depressed (CMS/HCC)    • Cervical myelopathy (CMS/HCC)    • Colon polyp    • Depression    • Gastritis    • GERD (gastroesophageal reflux disease)    • H/O colonoscopy with polypectomy    • Migraine headache    • Pancreatic cyst     4 cm     Past Surgical History:   Procedure Laterality Date   • COLONOSCOPY  2013   • COLONOSCOPY N/A 10/31/2016    polyps, tics, IH, tubular adenoma w/low grade dysplasia x 2, hyperplastic polyp x3   • COLONOSCOPY N/A 2019    Procedure: COLONOSCOPY  WITH COLD BIOPSIES AND COLD SNARE POLYPECTOMY;  Surgeon: Thad Brumfield MD;  Location: [facility] ENDOSCOPY;  Service: Gastroenterology   • ENDOSCOPY  2013   • ENDOSCOPY N/A 10/31/2016    erythematous mucosa in  stomach   • ENDOSCOPY N/A 12/9/2019    Procedure: ESOPHAGOGASTRODUODENOSCOPYEITH COLD BIOPSIES;  Surgeon: Thad Brumfield MD;  Location: [facility] ENDOSCOPY;  Service: Gastroenterology   • TONSILLECTOMY     • UPPER GASTROINTESTINAL ENDOSCOPY       General Information     Row Name 09/10/21 1554          Physical Therapy Time and Intention    Document Type  evaluation;discharge evaluation/summary  -     Mode of Treatment  individual therapy;physical therapy  -     Row Name 09/10/21 1554          General Information    Prior Level of Function  independent:  -     Existing Precautions/Restrictions  fall  -     Barriers to Rehab  none identified  -     Row Name 09/10/21 1554          Living Environment    Lives With  alone  -     Row Name 09/10/21 1554          Home Main Entrance    Number of Stairs, Main Entrance  none  -     Row Name 09/10/21 1554          Cognition    Orientation Status (Cognition)  oriented x 4  -       User Key  (r) = Recorded By, (t) = Taken By, (c) = Cosigned By    Initials Name Provider Type     Hemalatha Mccauley, MARY Physical Therapist        Mobility     Row Name 09/10/21 1554          Bed Mobility    Bed Mobility  bed mobility (all) activities  -     All Activities, [place] (Bed Mobility)  independent  -     Row Name 09/10/21 1554          Sit-Stand Transfer    Sit-Stand [place] (Transfers)  standby assist  -     Row Name 09/10/21 1554          Gait/Stairs (Locomotion)    [place] Level (Gait)  standby assist  -     Distance in Feet (Gait)  300  -       User Key  (r) = Recorded By, (t) = Taken By, (c) = Cosigned By    Initials Name Provider Type    Hemalatha Hurtado, PT Physical Therapist        Obj/Interventions     Row Name 09/10/21 1554          Range of Motion Comprehensive    Comment, General Range of Motion  RUE deferred secondary to pain  -     Row Name 09/10/21 1554          Strength Comprehensive (MMT)    Comment, General Manual  Muscle Testing (MMT) Assessment  [facility] Name 09/10/21 1554          Balance    Balance Assessment  sitting static balance;sitting dynamic balance;standing static balance;standing dynamic balance  -     Static Sitting Balance  WNL  -     Dynamic Sitting Balance  WNL  -     Static Standing Balance  WFL  -     Dynamic Standing Balance  [facility]       User Key  (r) = Recorded By, (t) = Taken By, (c) = Cosigned By    Initials Name Provider Type    Hemalatha Hurtado, PT Physical Therapist        Goals/Plan    No documentation.       Clinical Impression     Row Name 09/10/21 1608          Pain    Additional Documentation  Pain Scale: Numbers Pre/Post-Treatment (Group)  [facility] Name 09/10/21 1608          Pain Scale: Numbers Pre/Post-Treatment    Pretreatment Pain Rating  2/10  -     Posttreatment Pain Rating  2/10  -     Pain Location - Side  Right  -     Pain Location  chest  [facility] Name 09/10/21 1608          Plan of Care Review    Plan of Care Reviewed With  patient  -     Outcome Summary  Pt admitted with fractured ribs and scapula after fall at home. Pt was agreeable to therapy and moving quite well. She is independently mobile, ambulated 300 ft with SBA and is safe to d/c home when medically stable. PT will sign off.  -     Row Name 09/10/21 1608          Therapy Assessment/Plan (PT)    Patient/Family Therapy Goals Statement (PT)  return home to [facility]     Criteria for Skilled Interventions Met (PT)  no problems identified which require skilled intervention  [facility] Name 09/10/21 1608          Positioning and Restraints    Pre-Treatment Position  in bed  -     Post Treatment Position  bed  -     In Bed  fowlers;call light within reach;encouraged to call for assist;notified [facility]       User Key  (r) = Recorded By, (t) = Taken By, (c) = Cosigned By    Initials Name Provider Type    Hemalatha Hurtado, PT Physical Therapist        Outcome Measures      Row Name 09/10/21 1610          How much help from another person do you currently need...    Turning from your back to your side while in flat bed without using bedrails?  4  -KH     Moving from lying on back to sitting on the side of a flat bed without bedrails?  4  -KH     Moving to and from a bed to a chair (including a wheelchair)?  4  -KH     Standing up from a chair using your arms (e.g., wheelchair, bedside chair)?  4  -KH     Climbing 3-5 steps with a railing?  3  -KH     To walk in hospital room?  4  -KH     AM-PAC 6 Clicks Score (PT)  23  -KH     Row Name 09/10/21 1610          Functional Assessment    Outcome Measure Options  AM-PAC 6 Clicks Basic Mobility (PT)  -       User Key  (r) = Recorded By, (t) = Taken By, (c) = Cosigned By    Initials Name Provider Type    Hemalatha Hurtado PT Physical Therapist                         PT Recommendation and Plan     Plan of Care Reviewed With: patient  Outcome Summary: Pt admitted with fractured ribs and scapula after fall at home. Pt was agreeable to therapy and moving quite well. She is independently mobile, ambulated 300 ft with SBA and is safe to d/c home when medically stable. PT will sign off.     Time Calculation:   PT Charges     Row Name 09/10/21 1613 09/10/21 1610          Time Calculation    Start Time  1522  -KH  --     Stop Time  1530  -KH  --     Time Calculation (min)  8 min  -KH  --     PT Non-Billable Time (min)  --  5 min  -KH        Untimed Charges    PT Eval/Re-eval Minutes  8  -KH  --        Total Minutes    Untimed Charges Total Minutes  8  -KH  --      Total Minutes  8  -KH  --       User Key  (r) = Recorded By, (t) = Taken By, (c) = Cosigned By    Initials Name Provider Type    Hemalatha Hurtado PT Physical Therapist        Therapy Charges for Today     Code Description Service Date Service Provider Modifiers Qty    [id number]  PT EVAL LOW COMPLEXITY 1 9/10/2021 Hemalatha Mccauley, PT GP 1          PT  G-Codes  Outcome Measure Options: AM-PAC 6 Clicks Basic Mobility (PT)  AM-PAC 6 Clicks Score (PT): 23    Hemalatha Mccauley, PT  9/10/2021

## 2021-09-10 NOTE — SIGNIFICANT NOTE
09/10/21 1421   OTHER   Discipline occupational therapist   Rehab Time/Intention   Session Not Performed other (see comments)  (OT orders recieved, pt found to have multiple rib fx and R scapular fx, awaiting thoracic surgery consult. Will f/u pending activity orders/restrictions and WB status RUE)   Recommendation   OT - Next Appointment 09/11/21

## 2021-09-10 NOTE — PLAN OF CARE
Goal Outcome Evaluation:  Plan of Care Reviewed With: patient           Outcome Summary: Pt admitted with fractured ribs and scapula after fall at home. Pt was agreeable to therapy and moving quite well. She is independently mobile, ambulated 300 ft with SBA and is safe to d/c home when medically stable. PT will sign off.

## 2021-09-11 ENCOUNTER — READMISSION MANAGEMENT (OUTPATIENT)
Dept: CALL CENTER | Facility: HOSPITAL | Age: 76
End: 2021-09-11

## 2021-09-11 VITALS
DIASTOLIC BLOOD PRESSURE: 88 MMHG | BODY MASS INDEX: 19.09 KG/M2 | WEIGHT: 114.6 LBS | OXYGEN SATURATION: 97 % | HEIGHT: 65 IN | TEMPERATURE: 98.7 F | RESPIRATION RATE: 16 BRPM | HEART RATE: 70 BPM | SYSTOLIC BLOOD PRESSURE: 149 MMHG

## 2021-09-11 PROBLEM — R26.81 UNSTEADINESS: Status: RESOLVED | Noted: 2021-09-09 | Resolved: 2021-09-11

## 2021-09-11 PROCEDURE — 99203 OFFICE O/P NEW LOW 30 MIN: CPT | Performed by: NURSE PRACTITIONER

## 2021-09-11 PROCEDURE — G0378 HOSPITAL OBSERVATION PER HR: HCPCS

## 2021-09-11 PROCEDURE — G0008 ADMIN INFLUENZA VIRUS VAC: HCPCS | Performed by: INTERNAL MEDICINE

## 2021-09-11 PROCEDURE — 90686 IIV4 VACC NO PRSV 0.5 ML IM: CPT | Performed by: INTERNAL MEDICINE

## 2021-09-11 PROCEDURE — 25010000002 INFLUENZA VAC SPLIT QUAD 0.5 ML SUSPENSION PREFILLED SYRINGE: Performed by: INTERNAL MEDICINE

## 2021-09-11 PROCEDURE — 97535 SELF CARE MNGMENT TRAINING: CPT

## 2021-09-11 PROCEDURE — 97165 OT EVAL LOW COMPLEX 30 MIN: CPT

## 2021-09-11 RX ORDER — ACETAMINOPHEN 500 MG
1000 TABLET ORAL 3 TIMES DAILY
Status: DISCONTINUED | OUTPATIENT
Start: 2021-09-11 | End: 2021-09-11 | Stop reason: HOSPADM

## 2021-09-11 RX ORDER — CELECOXIB 100 MG/1
100 CAPSULE ORAL EVERY 12 HOURS SCHEDULED
Qty: 28 CAPSULE | Refills: 0 | Status: SHIPPED | OUTPATIENT
Start: 2021-09-11 | End: 2021-09-25

## 2021-09-11 RX ORDER — OXYCODONE HYDROCHLORIDE 5 MG/1
5 TABLET ORAL EVERY 4 HOURS PRN
Status: DISCONTINUED | OUTPATIENT
Start: 2021-09-11 | End: 2021-09-11 | Stop reason: HOSPADM

## 2021-09-11 RX ORDER — LIDOCAINE 50 MG/G
1 PATCH TOPICAL
Qty: 14 PATCH | Refills: 0 | Status: SHIPPED | OUTPATIENT
Start: 2021-09-11 | End: 2021-09-25

## 2021-09-11 RX ORDER — LIDOCAINE 50 MG/G
1 PATCH TOPICAL
Status: DISCONTINUED | OUTPATIENT
Start: 2021-09-11 | End: 2021-09-11 | Stop reason: HOSPADM

## 2021-09-11 RX ORDER — ACETAMINOPHEN 500 MG
1000 TABLET ORAL 3 TIMES DAILY
Qty: 84 TABLET | Refills: 0 | Status: SHIPPED | OUTPATIENT
Start: 2021-09-11 | End: 2021-09-25

## 2021-09-11 RX ORDER — CELECOXIB 100 MG/1
100 CAPSULE ORAL EVERY 12 HOURS SCHEDULED
Status: DISCONTINUED | OUTPATIENT
Start: 2021-09-11 | End: 2021-09-11 | Stop reason: HOSPADM

## 2021-09-11 RX ORDER — OXYCODONE HYDROCHLORIDE 5 MG/1
5 TABLET ORAL EVERY 6 HOURS PRN
Qty: 15 TABLET | Refills: 0 | Status: SHIPPED | OUTPATIENT
Start: 2021-09-11 | End: 2021-09-16

## 2021-09-11 RX ADMIN — ACETAMINOPHEN 1000 MG: 500 TABLET, FILM COATED ORAL at 12:48

## 2021-09-11 RX ADMIN — PANTOPRAZOLE SODIUM 40 MG: 40 TABLET, DELAYED RELEASE ORAL at 06:30

## 2021-09-11 RX ADMIN — BUPROPION HYDROCHLORIDE 150 MG: 150 TABLET, EXTENDED RELEASE ORAL at 09:15

## 2021-09-11 RX ADMIN — LAMOTRIGINE 150 MG: 100 TABLET ORAL at 09:15

## 2021-09-11 RX ADMIN — INFLUENZA VIRUS VACCINE 0.5 ML: 15; 15; 15; 15 SUSPENSION INTRAMUSCULAR at 12:46

## 2021-09-11 RX ADMIN — SODIUM CHLORIDE, PRESERVATIVE FREE 10 ML: 5 INJECTION INTRAVENOUS at 09:16

## 2021-09-11 RX ADMIN — HYDROCODONE BITARTRATE AND ACETAMINOPHEN 1 TABLET: 7.5; 325 TABLET ORAL at 09:15

## 2021-09-11 RX ADMIN — CELECOXIB 100 MG: 100 CAPSULE ORAL at 12:48

## 2021-09-11 RX ADMIN — HYDROCODONE BITARTRATE AND ACETAMINOPHEN 1 TABLET: 7.5; 325 TABLET ORAL at 05:01

## 2021-09-11 RX ADMIN — CALCIUM POLYCARBOPHIL 1250 MG: 625 TABLET, FILM COATED ORAL at 09:15

## 2021-09-11 NOTE — DISCHARGE SUMMARY
Patient Name: Lona Thomson  : 1945  MRN: [id number]    Date of Admission: 2021  Date of Discharge:  2021  Primary Care Physician: Ludwin Herrmann MD      Chief Complaint:   Fall and Shoulder Pain      Discharge Diagnoses     Active Hospital Problems    Diagnosis  POA   • **Multiple closed fractures of ribs of right side [S22.41XA]  Yes   • Closed fracture of right scapula [S42.101A]  Yes   • Fall [W19.XXXA]  Yes   • Gastroesophageal reflux disease [K21.9]  Yes   • Pancreatic cyst [K86.2]  Yes   • Bipolar disorder (CMS/HCC) [F31.9]  Yes   • Fatigue [R53.83]  Yes      Resolved Hospital Problems    Diagnosis Date Resolved POA   • Unsteadiness [R26.81] 2021 Yes   • Cervical myelopathy (CMS/HCC) [G95.9] 2021 Yes        Hospital Course     Ms. Thomson is a 75 y.o. female with a history of bipolar disorder, gerd, osteoporosis who presented to [facility] initially complaining of pain and discomfort in right shoulder and right chest after a mechanical fall.  Please see the admitting history and physical for further details.  She was found to have very mildly displaced to non-displaced right sided rib fractures (4-6) and a comminuted fracture at the inferior aspect of the scapula and was admitted to the hospital for further evaluation and treatment.      There was some concern for a possible cervical myelopathy by the admitting physician and so a MRI of the cervical spine was performed which showed degenerative changes without stenosis and a vallecular cyst. Thoracic surgery saw the patient in consultation and recommended pulmonary hygiene and pain control, and recommended that she follow up with them in about a month for another x-ray of her chest. Her pain was well controlled, and she was cleared for regular activity by physical therapy. She is being discharged with a short course of pain medication per thoracic surgery's advice.    Day of Discharge      Subjective:  Feeling well today. Pain is adequately controlled. Thoracic surgery saw patient and cleared for discharge with plans for follow up in about a month.    Physical Exam:  Temp:  [97.9 °F (36.6 °C)-99.4 °F (37.4 °C)] 98.7 °F (37.1 °C)  Heart Rate:  [70-79] 70  Resp:  [16] 16  BP: (124-160)/(59-88) 149/88  Body mass index is 19.07 kg/m².  Physical Exam  Constitutional:       General: She is not in acute distress.  Cardiovascular:      Rate and Rhythm: Normal rate and regular rhythm.      Heart sounds: Normal heart sounds.   Pulmonary:      Effort: Pulmonary effort is normal. No respiratory distress.      Breath sounds: Normal breath sounds. No wheezing, rhonchi or rales.   Abdominal:      General: Bowel sounds are normal.      Palpations: Abdomen is soft.   Musculoskeletal:         General: No tenderness.      Right lower leg: No edema.      Left lower leg: No edema.   Neurological:      Mental Status: She is alert.   Psychiatric:         Mood and Affect: Mood normal.         Behavior: Behavior normal.         Consultants     Consult Orders (all) (From admission, onward)     Start     Ordered    09/10/21 1537  Inpatient Thoracic Surgery Consult  Once     Specialty:  Thoracic Surgery  Provider:  Jerman Joseph III, MD    09/10/21 1537    09/09/21 1550  [facility] (on-call MD unless specified) Details  Once     Specialty:  Hospitalist  Provider:  (Not yet assigned)    09/09/21 1549              Procedures     Imaging Results (All)     Procedure Component Value Units Date/Time    MRI Cervical Spine With & Without Contrast [[id number]] Collected: 09/10/21 2358     Updated: 09/10/21 2358    Narrative:        Patient: ENE DESAI  Time Out: 23:57  Exam(s): MRI C SPINE W WO Contrast     EXAM:    MR Cervical Spine Without and With Intravenous Contrast    CLINICAL HISTORY:     Reason for exam: vervical myelopathy.    TECHNIQUE:    Magnetic resonance images of the cervical spine without and with   intravenous  contrast in multiple planes.    COMPARISON:    None.    FINDINGS:    Vertebrae:  Reversal of the cervical lordosis.  Modic degenerative   endplate changes are seen throughout the cervical spine, most severe at   C4-C5.    Spinal cord:  No discrete spinal cord abnormality.  No abnormal   enhancement.    Soft tissues:  Unremarkable.  Other:  T2-weighted hyperintensity with rim enhancement along the right   vallecula measuring approximately 10 x 6 x 7 mm.     DISCS SPINAL CANAL NEURAL FORAMINA:    C2-C3:  Facet arthrosis, left more severe than right.  No significant   disc protrusion, spinal canal stenosis, or foraminal narrowing.    C3-C4:  Mild disc bulge with endplate osteophytes, uncovertebral   hypertrophy, and facet arthrosis.  No significant spinal canal stenosis.    Mild bilateral foraminal narrowing.    C4-C5:  Disc bulge with endplate osteophytes, uncovertebral hypertrophy,   and facet arthrosis.  No significant spinal canal stenosis.  Mild   bilateral foraminal narrowing.    C5-C6:  Disc bulge with endplate osteophytes, uncovertebral hypertrophy,   and facet arthrosis.  Mild spinal canal stenosis.  Mild right and severe   left foraminal narrowing.    C6-C7:  Mild disc bulge with endplate osteophytes, uncovertebral   hypertrophy, and facet arthrosis.  Mild spinal canal stenosis.  Moderate   bilateral foraminal narrowing.    C7-T1:  Unremarkable.  No significant disc disease.  No stenosis.    IMPRESSION:       1.  Multilevel degenerative changes throughout the cervical spine as   described.  2.  T2-weighted hyperintensity with rim enhancement along the right   vallecula measuring approximately 10 x 6 x 7 mm.  This may represent a   submucosal cyst.  Direct visualization is recommended.      Impression:          Electronically signed by Juan Isbell MD on 09-10-21 at 2357    CT Chest With Contrast Diagnostic [[id number]] Collected: 09/09/21 1610     Updated: 09/10/21 0704    Narrative:      CT CHEST WITH IV  CONTRAST     HISTORY: 75-year-old female status post fall. Right rib and scapula  fractures.     TECHNIQUE: Radiation dose reduction techniques were utilized, including  automated exposure control and exposure modulation based on body size.   3 mm images were obtained through the chest after the administration of  IV contrast. Compared with radiographs performed earlier today.     FINDINGS: There are acute fractures at the lateral aspect of the right  4th, 5th, and 6th ribs. The most prominent internal displacement is 3 mm  at the 6th rib fracture. There is no pneumothorax. There is a comminuted  fracture at the inferior aspect of the scapular body. Glenoid is intact.     There is atelectatic change or scarring at the medial aspect of the  right lung base. There are no acute appearing airspace opacities and  there are no pleural or pericardial effusions. There is no  lymphadenopathy within the chest. There is 3.4 cm ectasia of the  ascending thoracic aorta. The caliber tapers to 2.7 cm at the aortic  arch. There is no evidence for a thoracic aortic dissection.       Impression:      1. Acute right lateral 4th, 5th, and 6th rib fractures as described.  Comminuted fracture at the inferior aspect of the scapula.  2. There is no pneumothorax or evidence for pulmonary contusion. There  is either atelectatic and/or scarring at the medial aspect of the right  lung base.     Discussed with CRYSTAL Casper.     This report was finalized on 9/10/2021 7:01 AM by Dr. Jazz Campbell M.D.       XR Scapula Right [[id number]] Collected: 09/09/21 1406     Updated: 09/09/21 1412    Narrative:      PROCEDURE:  XR SCAPULA RIGHT-, XR SHOULDER 2+ VW RIGHT-     HISTORY: Pain status post fall last night.     COMPARISON: Chest radiograph 02/15/2018     FINDINGS:       2 views of the right shoulder and 2 views of the right scapula were  obtained.     There is an acute mildly displaced likely transverse fracture through  the lower aspect  of the scapula with anterior displacement of the distal  fracture fragment. There are acute mildly displaced fractures of lateral  right ribs 4, 5, and 6. Joint spaces are preserved.          Impression:         Acute right scapular and right rib 4, 5, and 6 fractures, as above.        This report was finalized on 9/9/2021 2:09 PM by Dr. Salome Suazo M.D.       XR Shoulder 2+ View Right [[id number]] Collected: 09/09/21 1406     Updated: 09/09/21 1412    Narrative:      PROCEDURE:  XR SCAPULA RIGHT-, XR SHOULDER 2+ VW RIGHT-     HISTORY: Pain status post fall last night.     COMPARISON: Chest radiograph 02/15/2018     FINDINGS:       2 views of the right shoulder and 2 views of the right scapula were  obtained.     There is an acute mildly displaced likely transverse fracture through  the lower aspect of the scapula with anterior displacement of the distal  fracture fragment. There are acute mildly displaced fractures of lateral  right ribs 4, 5, and 6. Joint spaces are preserved.          Impression:         Acute right scapular and right rib 4, 5, and 6 fractures, as above.        This report was finalized on 9/9/2021 2:09 PM by Dr. Salome Suazo M.D.             Pertinent Labs     Results from last 7 days   Lab Units 09/10/21  0330 09/09/21  1441   WBC 10*3/mm3 9.17 13.27*   HEMOGLOBIN g/dL 14.7 16.0*   PLATELETS 10*3/mm3 322 395     Results from last 7 days   Lab Units 09/10/21  0330 09/09/21  1441   SODIUM mmol/L 143 139   POTASSIUM mmol/L 4.7 4.3   CHLORIDE mmol/L 104 101   CO2 mmol/L 28.4 25.9   BUN mg/dL 14 14   CREATININE mg/dL 0.74 0.74   GLUCOSE mg/dL 105* 134*   Estimated Creatinine Clearance: 51.4 mL/min (by C-G formula based on SCr of 0.74 mg/dL).  Results from last 7 days   Lab Units 09/10/21  0330 09/09/21  1441   ALBUMIN g/dL 4.00 4.80   BILIRUBIN mg/dL 0.3 0.2   ALK PHOS U/L 115 145*   AST (SGOT) U/L 11 17   ALT (SGPT) U/L 18 24     Results from last 7 days   Lab Units 09/10/21  0330 09/09/21  1442    CALCIUM mg/dL 9.4 10.5   ALBUMIN g/dL 4.00 4.80               Invalid input(s): LDLCALC        Test Results Pending at Discharge       Discharge Details        Discharge Medications      New Medications      Instructions Start Date   acetaminophen 500 MG tablet  Commonly known as: TYLENOL   1,000 mg, Oral, 3 Times Daily      celecoxib 100 MG capsule  Commonly known as: CeleBREX   100 mg, Oral, Every 12 Hours Scheduled      lidocaine 5 %  Commonly known as: LIDODERM   1 patch, Transdermal, Every 24 Hours Scheduled, Remove & Discard patch within 12 hours or as directed by MD      oxyCODONE 5 MG immediate release tablet  Commonly known as: ROXICODONE   5 mg, Oral, Every 6 Hours PRN         Continue These Medications      Instructions Start Date   alendronate 70 MG tablet  Commonly known as: FOSAMAX   70 mg, Oral, Every 7 Days      atorvastatin 20 MG tablet  Commonly known as: LIPITOR   20 mg, Oral, Nightly      buPROPion  MG 12 hr tablet  Commonly known as: WELLBUTRIN SR   150 mg, Oral, 2 Times Daily      FIBER-CAPS PO   2 capsules, Oral, Daily      lamoTRIgine 100 MG tablet  Commonly known as: LaMICtal   150 mg, Oral, 2 Times Daily      LORazepam 0.5 MG tablet  Commonly known as: ATIVAN   0.5 mg, Oral, 2 Times Daily PRN      omeprazole 40 MG capsule  Commonly known as: priLOSEC   TAKE 1 CAPSULE BY MOUTH EVERY DAY      prochlorperazine 5 MG tablet  Commonly known as: COMPAZINE   TAKE 1 TABLET BY MOUTH EVERY 6 (SIX) HOURS AS NEEDED FOR NAUSEA OR VOMITING.      TRINTELLIX PO   1 tablet, Oral, Daily, 15 mg per day      vitamin D 1.25 MG (30678 UT) capsule capsule  Commonly known as: ERGOCALCIFEROL   50,000 Units, Oral, Weekly             Allergies   Allergen Reactions   • Escitalopram Nausea Only     nausea   • Lexapro [Escitalopram Oxalate] GI Intolerance     nausea   • Prozac [Fluoxetine Hcl] Nausea Only         Discharge Disposition:  Home or Self Care    Discharge Diet:  Diet Order   Procedures   • Diet  Regular; Cardiac       Discharge Activity:   Activity Instructions     Activity as Tolerated            CODE STATUS:    Code Status and Medical Interventions:   Ordered at: 09/09/21 1811     Code Status:    CPR     Medical Interventions (Level of Support Prior to Arrest):    Full       Future Appointments   Date Time Provider Department Center   2/7/2022 10:00 AM [facility]   2/14/2022 11:15 AM Ludwin Herrmann MD [facility]     Additional Instructions for the Follow-ups that You Need to Schedule     Call MD With Problems / Concerns   As directed      Instructions: call your primary care physician if you experience chest pain, shortness of breath, abdominal pain, nausea, vomiting, fevers, sweats, chills, or worsening of your symptoms    Order Comments: Instructions: call your primary care physician if you experience chest pain, shortness of breath, abdominal pain, nausea, vomiting, fevers, sweats, chills, or worsening of your symptoms          Discharge Follow-up with PCP   As directed       Currently Documented PCP:    Ludwin Herrmann MD    PCP Phone Number:    [phone]     Follow Up Details: 2 weeks         Discharge Follow-up with Specialty: thoracic surgery, 1 month or as otherwise directed   As directed      Specialty: thoracic surgery, 1 month or as otherwise directed           Follow-up Information     Ludwin Herrmann MD .    Specialties: Family Medicine, Emergency Medicine  Why: 2 weeks  Contact information:  [address] [place] [address]  [phone]                   Additional Instructions for the Follow-ups that You Need to Schedule     Call MD With Problems / Concerns   As directed      Instructions: call your primary care physician if you experience chest pain, shortness of breath, abdominal pain, nausea, vomiting, fevers, sweats, chills, or worsening of your symptoms    Order Comments: Instructions: call your primary care physician if you  experience chest pain, shortness of breath, abdominal pain, nausea, vomiting, fevers, sweats, chills, or worsening of your symptoms          Discharge Follow-up with PCP   As directed       Currently Documented PCP:    Ludwin Herrmann MD    PCP Phone Number:    [phone]     Follow Up Details: 2 weeks         Discharge Follow-up with Specialty: thoracic surgery, 1 month or as otherwise directed   As directed      Specialty: thoracic surgery, 1 month or as otherwise directed           Time Spent on Discharge:  Greater than 30 minutes      Abel Alarcon MD  [facility]  09/11/21  11:44 EDT

## 2021-09-11 NOTE — OUTREACH NOTE
Prep Survey      Responses   [facility] patient discharged from?  [place]   Is LACE score < 7 ?  Yes   Emergency Room discharge w/ pulse ox?  No   Eligibility  [facility]   Date of Admission  09/09/21   Date of Discharge  09/11/21   Discharge Disposition  Home or Self Care   Discharge diagnosis  sp fall,  mulitple closed fx of ribs rt side,  closed fx rt scapula   Does the patient have one of the following disease processes/diagnoses(primary or secondary)?  Other   Does the patient have Home health ordered?  No   Is there a DME ordered?  No   Prep survey completed?  Yes          Pastora Jacob RN

## 2021-09-11 NOTE — PLAN OF CARE
Goal Outcome Evaluation:  Plan of Care Reviewed With: patient   Progress: improving  Outcome Summary: VSS. MRI done, C/o pain x1 PRN given. Patient resting through night. Will continue to monitor.

## 2021-09-11 NOTE — CONSULTS
Inpatient Thoracic Surgery Consult  Consult performed by: Aurelia Reddy, MARY, APRN  Consult ordered by: Abel Alarcon MD          Patient Care Team:  Ludwin Herrmann MD as PCP - General (Family Medicine)  Thad Brumfield MD as Consulting Physician (Gastroenterology)  Adeola Kaufman MD as Consulting Physician (Gynecology)  Ashlee Hampton MD (Psychiatry)    Chief Complaint   Patient presents with   • Fall   • Shoulder Pain       Subjective     History of Present Illness    Ms. Lan is a pleasant 75-year-old lady with a history of GERD, depression and migraines.  She presented to [facility] ER on 9/9/2021 for mechanical fall after walking out of the bathroom.  Patient reports she lost control of her legs, fell and landed on the toilet hitting her right side/back shoulder area.  She denies loss of consciousness.  She is not on any anticoagulation medication.  Patient reports increase in weakness and unsteadiness since starting on Lipitor 2 months ago, which has increased over the past 2 weeks.    Initial evaluation in the ER included CT of the chest which demonstrated multiple right-sided rib fractures and right scapular fracture.  She has been evaluated by physical therapy who has signed off stating patient is independently mobile and able to ambulate without assistance.  Upon exam today, patient is alert and resting comfortably in bed.  She is hemodynamically stable on room air reports her pain is adequately controlled at rest.  Right-sided chest pain does increase with movement.    Thoracic surgery has been consulted for further evaluation and management of right rib fractures.    Review of Systems   Constitutional: Negative for fever.   HENT: Negative for congestion and trouble swallowing.    Eyes: Negative for discharge.   Respiratory: Negative for chest tightness and shortness of breath.    Cardiovascular: Positive for chest pain.   Gastrointestinal: Negative for abdominal distention.  AHCM-SS    6/16/2021 3:28 PM    Max heart rate: 85%    79 year old    Wt Readings from Last 1 Encounters:   06/16/21 94.2 kg      Ht Readings from Last 1 Encounters:   06/16/21 5' 10\" (1.778 m)       Patient Type: Inpatient    Cardiac Markers: See Results    Reason for test: Chest pain    Primary MD: Nicho   Surgeon:SUSANA    Ordering MD: Neptali   Other:Damico     Cardiologist Performing Test:Chest pain, SOB    --------------------------------------------------------------------------------------------------------------------  HISTORY OF: Heart Catherization, Coronary Stent, Smoker, HTN and High Cholesterol       PATIENT HAS HAD THE FOLLOWING IN THE LAST 24 HOURS:  Medication / Inhalers.    Current Facility-Administered Medications   Medication Dose Route Frequency Provider Last Rate Last Admin   • hydrALAZINE (APRESOLINE) tablet 25 mg  25 mg Oral Q4H PRN Asam Poe, DO   25 mg at 06/16/21 0738   • melatonin tablet 3 mg  3 mg Oral QHS PRN Asam Poe, DO       • dextrose 50 % injection 25 g  25 g Intravenous PRN Asam Poe, DO       • dextrose 50 % injection 12.5 g  12.5 g Intravenous PRN Asam Poe, DO       • glucagon (GLUCAGEN) injection 1 mg  1 mg Intramuscular PRN Asam Poe, DO       • dextrose (GLUTOSE) 40 % gel 15 g  15 g Oral PRN Asam Poe, DO       • dextrose (GLUTOSE) 40 % gel 30 g  30 g Oral PRN Asam Poe, DO       • sodium chloride 0.9 % flush bag 25 mL  25 mL Intravenous PRN Asam Poe, DO       • nitroGLYcerin (NITROSTAT) sublingual tablet 0.4 mg  0.4 mg Sublingual Q5 Min PRN Asam Poe, DO       • sodium chloride (NORMAL SALINE) 0.9 % bolus 500 mL  500 mL Intravenous PRN Asam Poe, DO       • [Held by provider] enoxaparin (LOVENOX) injection 40 mg  40 mg Subcutaneous Daily Tyler Poe, DO       • insulin lispro (ADMELOG, HumaLOG) sliding scale injection   Subcutaneous 4x Daily AC & HS Tyler Poe, DO   2 Units at 06/16/21 1257   • Potassium Standard Replacement Protocol   Does not apply See Admin Instructions    Endocrine: Negative for cold intolerance.   Genitourinary: Negative for difficulty urinating.   Musculoskeletal: Positive for back pain. Negative for neck pain.   Skin: Negative for color change.   Allergic/Immunologic: Negative for environmental allergies.   Neurological: Positive for weakness.   Hematological: Negative for adenopathy.   Psychiatric/Behavioral: Negative for confusion.        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, unspecified   • Pancreatic cyst   • Polyp of colon   • Constipation   • Gastroesophageal reflux disease   • Localized osteoporosis without current pathological fracture   • Osteopenia of multiple sites   • Postmenopause   • Familial hypercholesteremia   • Multiple closed fractures of ribs of right side   • Closed fracture of right scapula   • Fall   • Unsteadiness     Past Medical History:   Diagnosis Date   • Bipolar affect, depressed (CMS/HCC)    • Cervical myelopathy (CMS/HCC)    • Colon polyp    • Depression    • Gastritis    • GERD (gastroesophageal reflux disease)    • H/O colonoscopy with polypectomy    • Migraine headache    • Pancreatic cyst     4 cm     Past Surgical History:   Procedure Laterality Date   • COLONOSCOPY  05/2013   • COLONOSCOPY N/A 10/31/2016    polyps, tics, IH, tubular adenoma w/low grade dysplasia x 2, hyperplastic polyp x3   • COLONOSCOPY N/A 12/9/2019    Procedure: COLONOSCOPY  WITH COLD BIOPSIES AND COLD SNARE POLYPECTOMY;  Surgeon: Thad Brumfield MD;  Location: [facility] ENDOSCOPY;  Service: Gastroenterology   • ENDOSCOPY  11/2013   • ENDOSCOPY N/A 10/31/2016    erythematous mucosa in stomach   • ENDOSCOPY N/A  Tyler Poe, DO       • Magnesium Standard Replacement Protocol   Does not apply See Admin Instructions Tyler Poe, DO       • ondansetron (ZOFRAN) injection 4 mg  4 mg Intravenous BID PRN Tyler Poe, DO       • acetaminophen (TYLENOL) tablet 650 mg  650 mg Oral Q4H PRN Tyler Poe, DO       • traMADol (ULTRAM) tablet 50 mg  50 mg Oral Q6H PRN Tyler Poe, DO   50 mg at 06/16/21 0213   • bisacodyl (DULCOLAX) suppository 10 mg  10 mg Rectal Daily PRN Tyler Poe, DO       • docusate sodium-sennosides (SENOKOT S) 50-8.6 MG 2 tablet  2 tablet Oral Daily PRN Tyler Poe, DO       • polyethylene glycol (MIRALAX) packet 17 g  17 g Oral Daily PRN Tyler Poe, DO       • aspirin chewable 81 mg  81 mg Oral Daily Tyler Poe,    81 mg at 06/16/21 0942   • morphine injection 1 mg  1 mg Intravenous Q4H PRN Tyler Poe DO       • amLODIPine (NORVASC) tablet 5 mg  5 mg Oral Daily Alyse Gunderson MD   5 mg at 06/16/21 0943   • atorvastatin (LIPITOR) tablet 40 mg  40 mg Oral Nightly Alyse Gunderson MD       • [Held by provider] carvedilol (COREG) tablet 6.25 mg  6.25 mg Oral BID Alyse Gunderson MD       • DOBUTamine (DOBUTREX) 500 mg/250 mL in dextrose 5 % infusion  10 mcg/kg/min (Dosing Weight) Intravenous Continuous Alyse Gunderson MD   Completed at 06/16/21 1511   • sodium chloride 0.9 % flush bag 25 mL  25 mL Intravenous PRN Tyler Poe DO       • sodium chloride (PF) 0.9 % injection 2 mL  2 mL Intracatheter 2 times per day Tyler Poe DO   2 mL at 06/16/21 0942       ALLERGIES:  No Known Allergies    MEETS CRITERIA FOR STRESS TEST: Yes         NOTES: MD present for exam.  Dobutamine started at 10 mcg/kg/min, titrated to 20.  Discontinued after 3 minutes, 20 seconds after target heart rate achieved.  REturned to baseline.  Report called to floor RN.    TYPE OF TEST: Dobutamine and Stress Echo    ABLE TO WALK: No. Unable to walk reason: Stability concerns     NEEDED: No   2019    Procedure: ESOPHAGOGASTRODUODENOSCOPYEITH COLD BIOPSIES;  Surgeon: Thad Brumfield MD;  Location: [facility] ENDOSCOPY;  Service: Gastroenterology   • TONSILLECTOMY     • UPPER GASTROINTESTINAL ENDOSCOPY       Family History   Problem Relation Age of Onset   • Stroke Mother    • Cancer Father    • Cancer Maternal Grandmother    • Stroke Maternal Grandmother      Social History     Socioeconomic History   • Marital status: Single     Spouse name: Not on file   • Number of children: 0   • Years of education: Not on file   • Highest education level: Not on file   Tobacco Use   • Smoking status: Former Smoker     Packs/day: 0.25     Years: 12.00     Pack years: 3.00     Types: Cigarettes     Quit date: 2019     Years since quittin.7   • Smokeless tobacco: Never Used   • Tobacco comment: We discussed nicotine patch, gum, hypnosis.   Substance and Sexual Activity   • Alcohol use: Yes     Comment: social   • Drug use: Never   • Sexual activity: Defer     Medications Prior to Admission   Medication Sig Dispense Refill Last Dose   • atorvastatin (LIPITOR) 20 MG tablet Take 1 tablet by mouth Every Night. 90 tablet 3 2021 at Unknown time   • buPROPion SR (WELLBUTRIN SR) 150 MG 12 hr tablet Take 150 mg by mouth 2 (Two) Times a Day.   2021 at Unknown time   • Calcium Polycarbophil (FIBER-CAPS PO) Take 2 capsules by mouth Daily.   2021 at Unknown time   • lamoTRIgine (LaMICtal) 100 MG tablet Take 1.5 tablets by mouth 2 (Two) Times a Day.   2021 at Unknown time   • LORazepam (ATIVAN) 0.5 MG tablet Take 0.5 mg by mouth 2 (Two) Times a Day As Needed.  2 2021 at Unknown time   • omeprazole (priLOSEC) 40 MG capsule TAKE 1 CAPSULE BY MOUTH EVERY DAY 90 capsule 3 2021 at Unknown time   • prochlorperazine (COMPAZINE) 5 MG tablet TAKE 1 TABLET BY MOUTH EVERY 6 (SIX) HOURS AS NEEDED FOR NAUSEA OR VOMITING. 60 tablet 2 Past Week at Unknown time   • vitamin D (ERGOCALCIFEROL) 1.25 MG (36955 UT) capsule  capsule Take 1 capsule by mouth 1 (One) Time Per Week. 12 capsule 0 9/8/2021 at Unknown time   • Vortioxetine HBr (TRINTELLIX PO) Take 1 tablet by mouth Daily. 15 mg per day   9/8/2021 at Unknown time   • alendronate (FOSAMAX) 70 MG tablet Take 1 tablet by mouth Every 7 (Seven) Days. 12 tablet 4 Unknown at Unknown time     Allergies   Allergen Reactions   • Escitalopram Nausea Only     nausea   • Lexapro [Escitalopram Oxalate] GI Intolerance     nausea   • Prozac [Fluoxetine Hcl] Nausea Only       Objective      Vital Signs  Temp:  [97.9 °F (36.6 °C)-99.4 °F (37.4 °C)] 98.5 °F (36.9 °C)  Heart Rate:  [74-79] 76  Resp:  [16] 16  BP: (124-160)/(59-83) 137/75    Intake & Output (last day)       09/10 0701 - 09/11 0700 09/11 0701 - 09/12 0700    P.O. 0     Total Intake(mL/kg) 0 (0)     Urine (mL/kg/hr) 1000 (0.8)     Total Output 1000     Net -1000           Urine Unmeasured Occurrence 0 x           Physical Exam  Constitutional:       Appearance: Normal appearance.   HENT:      Head: Normocephalic and atraumatic.   Cardiovascular:      Rate and Rhythm: Normal rate.      Pulses: Normal pulses.   Pulmonary:      Effort: Pulmonary effort is normal. No respiratory distress.      Breath sounds: Decreased breath sounds present.   Chest:      Chest wall: No crepitus.   Musculoskeletal:         General: Normal range of motion.      Cervical back: Normal range of motion.   Skin:     General: Skin is warm and dry.      Findings: No bruising or erythema.   Neurological:      General: No focal deficit present.      Mental Status: She is alert and oriented to person, place, and time.   Psychiatric:         Mood and Affect: Mood normal.         Behavior: Behavior normal.         Results Review:    I reviewed the patient's new clinical results.  I reviewed the patient's new imaging results and agree with the interpretation.  I reviewed the patient's other test results and agree with the interpretation  Discussed with patient, RN and  Dr. Joseph    Imaging Results (Last 24 Hours)     Procedure Component Value Units Date/Time    MRI Cervical Spine With & Without Contrast [[id number]] Collected: 09/10/21 2358     Updated: 09/10/21 2358    Narrative:        Patient: ENE DESAI  Time Out: 23:57  Exam(s): MRI C SPINE W WO Contrast     EXAM:    MR Cervical Spine Without and With Intravenous Contrast    CLINICAL HISTORY:     Reason for exam: vervical myelopathy.    TECHNIQUE:    Magnetic resonance images of the cervical spine without and with   intravenous contrast in multiple planes.    COMPARISON:    None.    FINDINGS:    Vertebrae:  Reversal of the cervical lordosis.  Modic degenerative   endplate changes are seen throughout the cervical spine, most severe at   C4-C5.    Spinal cord:  No discrete spinal cord abnormality.  No abnormal   enhancement.    Soft tissues:  Unremarkable.  Other:  T2-weighted hyperintensity with rim enhancement along the right   vallecula measuring approximately 10 x 6 x 7 mm.     DISCS SPINAL CANAL NEURAL FORAMINA:    C2-C3:  Facet arthrosis, left more severe than right.  No significant   disc protrusion, spinal canal stenosis, or foraminal narrowing.    C3-C4:  Mild disc bulge with endplate osteophytes, uncovertebral   hypertrophy, and facet arthrosis.  No significant spinal canal stenosis.    Mild bilateral foraminal narrowing.    C4-C5:  Disc bulge with endplate osteophytes, uncovertebral hypertrophy,   and facet arthrosis.  No significant spinal canal stenosis.  Mild   bilateral foraminal narrowing.    C5-C6:  Disc bulge with endplate osteophytes, uncovertebral hypertrophy,   and facet arthrosis.  Mild spinal canal stenosis.  Mild right and severe   left foraminal narrowing.    C6-C7:  Mild disc bulge with endplate osteophytes, uncovertebral   hypertrophy, and facet arthrosis.  Mild spinal canal stenosis.  Moderate   bilateral foraminal narrowing.    C7-T1:  Unremarkable.  No significant disc disease.  No  stenosis.    IMPRESSION:       1.  Multilevel degenerative changes throughout the cervical spine as   described.  2.  T2-weighted hyperintensity with rim enhancement along the right   vallecula measuring approximately 10 x 6 x 7 mm.  This may represent a   submucosal cyst.  Direct visualization is recommended.      Impression:          Electronically signed by Juan Isbell MD on 09-10-21 at 1429          Lab Results:  Lab Results (last 24 hours)     ** No results found for the last 24 hours. **              Assessment/Plan       Multiple closed fractures of ribs of right side    Cervical myelopathy (CMS/HCC)    Fatigue    Bipolar disorder (CMS/HCC)    Pancreatic cyst    Gastroesophageal reflux disease    Closed fracture of right scapula    Fall    Unsteadiness      Assessment & Plan    I have independently reviewed the CT of the chest performed upon admission which demonstrates nondisplaced lateral fractures of the right fourth, fifth and sixth ribs.  A comminuted fracture to the inferior scapula is also noted.  There is no pneumothorax or hematoma.  There is no mediastinal lymphadenopathy.    Multiple right rib fractures: Recommend conservative management with rib fracture protocol medications.  This includes scheduled Tylenol, gabapentin, Celebrex and Lidoderm patch.  She may have as needed Dilaudid and oxycodone for breakthrough pain.  Also recommend to perform aggressive pulmonary care including incentive spirometry 10 times per hour and mobilization as tolerated.  Physical therapy has signed off stating patient is independent with daily activities.  This was discussed with the patient at length explaining risks of developing pneumonia when sedentary.  Patient is stable for discharge from a thoracic standpoint.  We will plan to follow-up with her in 1 month with a hospital performed chest x-ray for reevaluation.  Discussed with Dr. Alarcon.    I discussed the patients findings and our recommendations with  patient, nursing staff and Dr. Joseph    Thank you for this consult and allowing us to participate in the care of your patient.  We will follow along with you during this hospitalization.       Aurelia Reddy DNP, APRN  [facility]  09/11/21  10:57 EDT    I spent >45 minutes reviewing the patient's chart including medical history, notes, radiographic imaging, labs and performing an assessment and development of a plan and discussion with the patient/family at bedside.

## 2021-09-11 NOTE — THERAPY EVALUATION
Patient Name: Lona Thomson  : 1945    MRN: [id number]                              Today's Date: 2021       Admit Date: 2021    Visit Dx:     ICD-10-CM ICD-9-CM   1. Closed fracture of multiple ribs of right side, initial encounter  S22.41XA 807.09   2. Closed fracture of right scapula, unspecified part of scapula, initial encounter  S42.101A 811.00   3. 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, unspecified   • Pancreatic cyst   • Polyp of colon   • Constipation   • Gastroesophageal reflux disease   • Localized osteoporosis without current pathological fracture   • Osteopenia of multiple sites   • Postmenopause   • Familial hypercholesteremia   • Multiple closed fractures of ribs of right side   • Closed fracture of right scapula   • Fall     Past Medical History:   Diagnosis Date   • Bipolar affect, depressed (CMS/HCC)    • Cervical myelopathy (CMS/HCC)    • Colon polyp    • Depression    • Gastritis    • GERD (gastroesophageal reflux disease)    • H/O colonoscopy with polypectomy    • Migraine headache    • Pancreatic cyst     4 cm     Past Surgical History:   Procedure Laterality Date   • COLONOSCOPY  2013   • COLONOSCOPY N/A 10/31/2016    polyps, tics, IH, tubular adenoma w/low grade dysplasia x 2, hyperplastic polyp x3   • COLONOSCOPY N/A 2019    Procedure: COLONOSCOPY  WITH COLD BIOPSIES AND COLD SNARE POLYPECTOMY;  Surgeon: Thad Brumfield MD;  Location: [facility] ENDOSCOPY;  Service: Gastroenterology   • ENDOSCOPY  2013   • ENDOSCOPY N/A 10/31/2016    erythematous mucosa in stomach   • ENDOSCOPY N/A 2019    Procedure:  ESOPHAGOGASTRODUODENOSCOPYEIT COLD BIOPSIES;  Surgeon: Thad Brumfield MD;  Location: [facility] ENDOSCOPY;  Service: Gastroenterology   • TONSILLECTOMY     • UPPER GASTROINTESTINAL ENDOSCOPY       General Information     Row Name 09/11/21 1159          OT Time and Intention    Document Type  discharge evaluation/summary  -SCOT     Mode of Treatment  individual therapy;occupational therapy  -SCOT     Row Name 09/11/21 1159          General Information    Patient Profile Reviewed  yes  -SCOT     Prior Level of Function  independent:;ADL's  -SCOT     Existing Precautions/Restrictions  fall;non-weight bearing;right;shoulder  -SCOT     Barriers to Rehab  none identified  -SCOT     Row Name 09/11/21 1159          Occupational Profile    Reason for Services/Referral (Occupational Profile)  Pt is a 74 y/o F admit s/p fall resulting in R scapula fx and multiple rib fxs. Assumed RUE is NWB. W/u for concern for cervical myelopathy and recent unsteady gait. PMH includes bipolar d/c, GERD, depression and migraines. Pt resides in a condo with elevator access. Pt reports indep with ADLs and mobility at baseline.  -SCOT     Row Name 09/11/21 1159          Living Environment    Lives With  alone  -SCOT     Row Name 09/11/21 1159          Home Main Entrance    Number of Stairs, Main Entrance  none  -SCOT     Row Name 09/11/21 1159          Stairs Within Home, Primary    Number of Stairs, Within Home, Primary  none  -SCOT     Row Name 09/11/21 1159          Cognition    Orientation Status (Cognition)  oriented x 4  -SCOT     Row Name 09/11/21 1159          Safety Issues, Functional Mobility    Impairments Affecting Function (Mobility)  pain  -SCOT     Comment, Safety Issues/Impairments (Mobility)  gait belt and non skid socks used for safety  -SCOT       User Key  (r) = Recorded By, (t) = Taken By, (c) = Cosigned By    Initials Name Provider Type    SCOT Felicia Perez OT Occupational Therapist          Mobility/ADL's     Row Name 09/11/21 1202          Bed  Mobility    Bed Mobility  bed mobility (all) activities  -     All Activities, [place] (Bed Mobility)  independent  -[facility] Name 09/11/21 1202          Transfers    Transfers  sit-stand transfer  -     Sit-Stand [place] (Transfers)  modified independence  -[facility] Name 09/11/21 1202          Functional Mobility    Functional Mobility- Ind. Level  conditional independence  -[facility] Name 09/11/21 1202          Activities of Daily Living    BADL Assessment/Intervention  upper body dressing;other (see comments) OT educated patient on splinting technique using a pillow when coughing for pain mgmt.  -[facility] Name 09/11/21 1202          Upper Body Dressing Assessment/Training    [place] Level (Upper Body Dressing)  don;doff;front opening garment  -     Position (Upper Body Dressing)  edge of bed sitting  -     Comment (Upper Body Dressing)  OT provided education on jarrett dressing to RUE for pain mgmt.  -       User Key  (r) = Recorded By, (t) = Taken By, (c) = Cosigned By    Initials Name Provider Type     Felicia Perez, OT Occupational Therapist        Obj/Interventions     [facility] Name 09/11/21 1203          Sensory Assessment (Somatosensory)    Sensory Assessment (Somatosensory)  sensation intact  -SCOT     Row Name 09/11/21 1203          Vision Assessment/Intervention    Visual Impairment/Limitations  WFL;corrective lenses for reading  -[facility] Name 09/11/21 1203          Range of Motion Comprehensive    General Range of Motion  no range of motion deficits identified  -     Comment, General Range of Motion  OT recommended refraining from overhead positioning during ADLs to prevent pain and mvmt of scapula fx.  -[facility] Name 09/11/21 1203          Strength Comprehensive (MMT)    Comment, General Manual Muscle Testing (MMT) Assessment  WFL, RUE NT due to medical restrictions  -[facility] Name 09/11/21 1203          Balance    Balance Assessment  sitting static  balance;standing static balance;standing dynamic balance;sitting dynamic balance  -SCOT     Static Sitting Balance  WFL  -SCOT     Dynamic Sitting Balance  WFL  -SCOT     Static Standing Balance  WFL  -SCOT     Dynamic Standing Balance  WFL  -SCOT     Balance Interventions  occupation based/functional task  -       User Key  (r) = Recorded By, (t) = Taken By, (c) = Cosigned By    Initials Name Provider Type    Felicia Zimmerman, TAMIA Occupational Therapist        Goals/Plan    No documentation.       Clinical Impression     Row Name 09/11/21 1204          Pain Scale: Numbers Pre/Post-Treatment    Pretreatment Pain Rating  2/10  -SCOT     Posttreatment Pain Rating  2/10  -SCOT     Pain Location - Side  Right  -     Row Name 09/11/21 1204          Plan of Care Review    Plan of Care Reviewed With  patient  -SCOT     Outcome Summary  Pt is a 76 y/o F admit s/p fall resulting in R scapula fx and multiple rib fxs. Assumed RUE is NWB. W/u for concern for cervical myelopathy and recent unsteady gait. PMH includes bipolar d/c, GERD, depression and migraines. Pt resides in a condo with elevator access. Pt reports indep with ADLs and mobility at baseline. Pt educated on jarrett dressing techniques, ROM/WB restrictions for RUE and splinting techniques for coughing. Good receptiveness noted. Pt appears at baseline level of indep with ADLs. OT recommends d/c home and will s/o at this time.  -     Row Name 09/11/21 1204          Therapy Assessment/Plan (OT)    Therapy Frequency (OT)  evaluation only  -     Row Name 09/11/21 1204          Therapy Plan Review/Discharge Plan (OT)    Anticipated Discharge Disposition (OT)  home  -     Row Name 09/11/21 1204          Positioning and Restraints    Pre-Treatment Position  in bed  -SCOT     Post Treatment Position  bed  -SCOT     In Bed  notified nsg;supine;call light within reach;encouraged to call for assist;exit alarm on  -SCOT       User Key  (r) = Recorded By, (t) = Taken By, (c) = Cosigned By     Initials Name Provider Type    Felicia Zimmerman OT Occupational Therapist        Outcome Measures     Row Name 09/11/21 1206          How much help from another is currently needed...    Putting on and taking off regular lower body clothing?  4  -SCOT     Bathing (including washing, rinsing, and drying)  4  -SCOT     Toileting (which includes using toilet bed pan or urinal)  4  -SCOT     Putting on and taking off regular upper body clothing  4  -SCOT     Taking care of personal grooming (such as brushing teeth)  4  -SCOT     Eating meals  4  -SCOT     AM-PAC 6 Clicks Score (OT)  24  -SCOT     Row Name 09/11/21 1206          Modified [place] Scale    Modified [place] Scale  1 - No significant disability despite symptoms.  Able to carry out all usual duties and activities.  -     Row Name 09/11/21 1206          Functional Assessment    Outcome Measure Options  AM-PAC 6 Clicks Daily Activity (OT);Modified [place]  -SCOT       User Key  (r) = Recorded By, (t) = Taken By, (c) = Cosigned By    Initials Name Provider Type    Felicia Zimmerman OT Occupational Therapist          Occupational Therapy Education                 Title: PT OT SLP Therapies (Done)     Topic: Occupational Therapy (Done)     Point: ADL training (Done)     Description:   Instruct learner(s) on proper safety adaptation and remediation techniques during self care or transfers.   Instruct in proper use of assistive devices.              Learning Progress Summary           Patient Acceptance, E,TB, VU by SCOT at 9/11/2021 1206                   Point: Home exercise program (Done)     Description:   Instruct learner(s) on appropriate technique for monitoring, assisting and/or progressing therapeutic exercises/activities.              Learning Progress Summary           Patient Acceptance, E,TB, VU by SCOT at 9/11/2021 1206                   Point: Precautions (Done)     Description:   Instruct learner(s) on prescribed precautions during self-care and  functional transfers.              Learning Progress Summary           Patient Acceptance, E,TB, VU by SCOT at 9/11/2021 1206                               User Key     Initials Effective Dates Name Provider Type Discipline     06/16/21 -  Felicia Perez OT Occupational Therapist OT              OT Recommendation and Plan  Therapy Frequency (OT): evaluation only  Plan of Care Review  Plan of Care Reviewed With: patient  Outcome Summary: Pt is a 76 y/o F admit s/p fall resulting in R scapula fx and multiple rib fxs. Assumed RUE is NWB. W/u for concern for cervical myelopathy and recent unsteady gait. PMH includes bipolar d/c, GERD, depression and migraines. Pt resides in a condo with elevator access. Pt reports indep with ADLs and mobility at baseline. Pt educated on jarrett dressing techniques, ROM/WB restrictions for RUE and splinting techniques for coughing. Good receptiveness noted. Pt appears at baseline level of indep with ADLs. OT recommends d/c home and will s/o at this time.     Time Calculation:   Time Calculation- OT     Row Name 09/11/21 1207             Time Calculation- OT    OT Start Time  1035  -SCOT      OT Stop Time  1051  -SCOT      OT Time Calculation (min)  16 min  -SCOT      Total Timed Code Minutes- OT  8 minute(s)  -SCOT      OT Received On  09/11/21  -SCOT         Timed Charges    26315 - OT Self Care/Mgmt Minutes  8  -SCOT         Untimed Charges    OT Eval/Re-eval Minutes  8  -SCOT         Total Minutes    Timed Charges Total Minutes  8  -SCOT      Untimed Charges Total Minutes  8  -SCOT       Total Minutes  16  -SCOT        User Key  (r) = Recorded By, (t) = Taken By, (c) = Cosigned By    Initials Name Provider Type    Felicia Zimmerman OT Occupational Therapist        Therapy Charges for Today     Code Description Service Date Service Provider Modifiers Qty    30119617331 HC OT SELF CARE/MGMT/TRAIN EA 15 MIN 9/11/2021 Felicia Perez OT GO 1    [id number] HC OT EVAL LOW COMPLEXITY 2  9/11/2021 Felicia Perez, OT GO 1               Felicia Perez, TAMIA  9/11/2021

## 2021-09-11 NOTE — PLAN OF CARE
Goal Outcome Evaluation:  Plan of Care Reviewed With: patient           Outcome Summary: Pt is a 74 y/o F admit s/p fall resulting in R scapula fx and multiple rib fxs. Assumed RUE is NWB. W/u for concern for cervical myelopathy and recent unsteady gait. PMH includes bipolar d/c, GERD, depression and migraines. Pt resides in a condo with elevator access. Pt reports indep with ADLs and mobility at baseline. Pt educated on jarrett dressing techniques, ROM/WB restrictions for RUE and splinting techniques for coughing. Good receptiveness noted. Pt appears at baseline level of indep with ADLs. OT recommends d/c home and will s/o at this time.    Patient was wearing a face mask during this therapy encounter. Therapist used appropriate personal protective equipment including mask, gloves and eye protection.  Mask used was standard procedure mask. Appropriate PPE was worn during the entire therapy session. Hand hygiene was completed before and after therapy session. Patient is not in enhanced droplet precautions.

## 2021-09-13 ENCOUNTER — TRANSITIONAL CARE MANAGEMENT TELEPHONE ENCOUNTER (OUTPATIENT)
Dept: CALL CENTER | Facility: HOSPITAL | Age: 76
End: 2021-09-13

## 2021-09-13 NOTE — OUTREACH NOTE
Call Center TCM Note      Responses   [facility] patient discharged from?  [place]   Does the patient have one of the following disease processes/diagnoses(primary or secondary)?  Other   TCM attempt successful?  Yes   Call start time  1152   Call end time  1157   Discharge diagnosis  sp fall,  mulitple closed fx of ribs rt side,  closed fx rt scapula   Meds reviewed with patient/caregiver?  Yes   Is the patient having any side effects they believe may be caused by any medication additions or changes?  No   Does the patient have all medications ordered at discharge?  Yes   Is the patient taking all medications as directed (includes completed medication regime)?  Yes   Does the patient have a primary care provider?   Yes   Does the patient have an appointment with their PCP within 7 days of discharge?  No   Comments regarding PCP  states she will make a f/u with PCP later on- she is working on following up with the thoracic surgeon.   Nursing Interventions  Advised patient to make appointment   Has the patient kept scheduled appointments due by today?  N/A   Has home health visited the patient within 72 hours of discharge?  N/A   Psychosocial issues?  No   Did the patient receive a copy of their discharge instructions?  Yes   Nursing interventions  Reviewed instructions with patient   What is the patient's perception of their health status since discharge?  Improving   Is the patient/caregiver able to teach back the hierarchy of who to call/visit for symptoms/problems? PCP, Specialist, Home health nurse, Urgent Care, ED, 911  Yes   TCM call completed?  Yes   Wrap up additional comments  Says she is doing well, was asking about a refill on pain medication, advised her to call thoracic surgeons office.          Danielle Cole RN    9/13/2021, 11:57 EDT

## 2021-09-13 NOTE — CASE MANAGEMENT/SOCIAL WORK
Case Management Discharge Note      Final Note: Home.  No needs identified.  ALBERT Abreu    Provided Post Acute Provider List?: N/A  N/A Provider List Comment: The patient was not provided with a  HH/SNF list nor a print out of the HH/nursing home compare list from Medicare.gov as the patient currently denies any HH/SNF needs.  Provided Post Acute Provider Quality & Resource List?: N/A  N/A Quality & Resource List Comment: The patient was not provided with a  HH/SNF list nor a print out of the HH/nursing home compare list from Medicare.gov as the patient currently denies any HH/SNF needs.    Selected Continued Care - Discharged on 9/11/2021 Admission date: 9/9/2021 - Discharge disposition: Home or Self Care    Destination    No services have been selected for the patient.              Durable Medical Equipment    No services have been selected for the patient.              Dialysis/Infusion    No services have been selected for the patient.              Home Medical Care    No services have been selected for the patient.              Therapy    No services have been selected for the patient.              Community Resources    No services have been selected for the patient.              Community & DME    No services have been selected for the patient.                  Transportation Services  Taxi: other ([employer])    Final Discharge Disposition Code: 01 - home or self-care

## 2021-09-20 ENCOUNTER — TELEPHONE (OUTPATIENT)
Dept: INTERNAL MEDICINE | Facility: CLINIC | Age: 76
End: 2021-09-20

## 2021-09-20 NOTE — TELEPHONE ENCOUNTER
Caller: Lona Thomson    Relationship: Self    Best call back number: [phone] (H)    What is the best time to reach you: ANYTIME    Who are you requesting to speak with (clinical staff, provider,  specific staff member): CLINICAL STAFF    Do you know the name of the person who called:     What was the call regarding: PATIENT FELL LAST WEEK AND BROKE 3 RIBS AND HER SCAPULA, PATIENT IS REQUESTING A CALLBACK TO DISCUSS WHAT SHE SHOULD DO NEXT.     Do you require a callback: YES        THANKS

## 2021-09-20 NOTE — TELEPHONE ENCOUNTER
Scheduled patient for follow up. She has not followed up with thoracic surgeon. Notified patient she needs to make an appointment with Dr Joseph as well. Provided office phone number to patient, she was very pleased

## 2021-09-30 ENCOUNTER — OFFICE VISIT (OUTPATIENT)
Dept: INTERNAL MEDICINE | Facility: CLINIC | Age: 76
End: 2021-09-30

## 2021-09-30 VITALS
HEIGHT: 65 IN | SYSTOLIC BLOOD PRESSURE: 120 MMHG | HEART RATE: 79 BPM | BODY MASS INDEX: 19.13 KG/M2 | RESPIRATION RATE: 16 BRPM | DIASTOLIC BLOOD PRESSURE: 74 MMHG | OXYGEN SATURATION: 99 % | WEIGHT: 114.8 LBS | TEMPERATURE: 98.4 F

## 2021-09-30 DIAGNOSIS — S42.101A CLOSED FRACTURE OF RIGHT SCAPULA, UNSPECIFIED PART OF SCAPULA, INITIAL ENCOUNTER: ICD-10-CM

## 2021-09-30 DIAGNOSIS — S22.41XA CLOSED FRACTURE OF MULTIPLE RIBS OF RIGHT SIDE, INITIAL ENCOUNTER: Primary | ICD-10-CM

## 2021-09-30 PROCEDURE — 99215 OFFICE O/P EST HI 40 MIN: CPT | Performed by: FAMILY MEDICINE

## 2021-09-30 RX ORDER — BUPROPION HYDROCHLORIDE 300 MG/1
300 TABLET ORAL DAILY
COMMUNITY
Start: 2021-09-13 | End: 2022-10-09

## 2021-09-30 RX ORDER — CELECOXIB 100 MG/1
100 CAPSULE ORAL 2 TIMES DAILY PRN
Qty: 60 CAPSULE | Refills: 1 | Status: SHIPPED | OUTPATIENT
Start: 2021-09-30 | End: 2021-11-24

## 2021-09-30 NOTE — PATIENT INSTRUCTIONS
Rib Fracture    A rib fracture is a break or crack in one of the bones of the ribs. The ribs are long, curved bones that wrap around your chest and attach to your spine and your breastbone. The ribs protect your heart, lungs, and other organs in the chest.  A broken or cracked rib is often painful but is not usually serious. Most rib fractures heal on their own over time. However, rib fractures can be more serious if multiple ribs are broken or if broken ribs move out of place and push against other structures or organs.  What are the causes?  This condition is caused by:  · Repetitive movements with high force, such as pitching a baseball or having very bad coughing spells.  · A direct hit the chest, such as a sports injury, a car crash, or a fall.  · Cancer that has spread to the bones, which can weaken bones and cause them to break.  What are the signs or symptoms?  Symptoms of this condition include:  · Pain when you breathe in or cough.  · Pain when someone presses on the injured area.  · Feeling short of breath.  How is this diagnosed?  This condition is diagnosed with a physical exam and medical history. You may also have imaging tests, such as:  · Chest X-ray.  · CT scan.  · MRI.  · Bone scan.  · Chest ultrasound.  How is this treated?  Treatment for this condition depends on the severity of the fracture. Most rib fractures usually heal on their own in 1-3 months. Healing may take longer if you have a cough or if you do activities that make the injury worse. While you heal, you may be given medicines to control the pain. You will also be taught deep breathing exercises.  Severe injuries may require hospitalization or surgery.  Follow these instructions at home:  Managing pain, stiffness, and swelling  · If directed, put ice on the injured area. To do this:  ? Put ice in a plastic bag.  ? Place a towel between your skin and the bag.  ? Leave the ice on for 20 minutes, 2-3 times a day.  ? Remove the ice if  your skin turns bright red. This is very important. If you cannot feel pain, heat, or cold, you have a greater risk of damage to the area.  · Take over-the-counter and prescription medicines only as told by your health care provider.  Activity  · Avoid doing activities or movements that cause pain. Be careful during activities and avoid bumping the injured rib.  · Slowly increase your activity as told by your health care provider.  General instructions  · Do deep breathing exercises as told by your health care provider. This helps prevent pneumonia, which is a common complication of a broken rib. Your health care provider may instruct you to:  ? Take deep breaths several times a day.  ? Try to cough several times a day, holding a pillow against the injured area.  ? Use a device called incentive spirometer to practice deep breathing several times a day.  · Drink enough fluid to keep your urine pale yellow.  · Do not wear a rib belt or binder. These restrict breathing, which can lead to pneumonia.  · Keep all follow-up visits. This is important.  Contact a health care provider if:  · You have a fever.  Get help right away if:  · You have difficulty breathing or you are short of breath.  · You develop a cough that does not stop, or you cough up thick or bloody sputum.  · You have nausea, vomiting, or pain in your abdomen.  · Your pain gets worse and medicine does not help.  These symptoms may represent a serious problem that is an emergency. Do not wait to see if the symptoms will go away. Get medical help right away. Call your local emergency services (911 in the [place] Do not drive yourself to the hospital.  Summary  · A rib fracture is a break or crack in one of the bones of the ribs.  · A broken or cracked rib is often painful but is not usually serious.  · Most rib fractures heal on their own over time.  · Treatment for this condition depends on the severity of the fracture.  · Avoid doing any activities or  movements that cause pain.  This information is not intended to replace advice given to you by your health care provider. Make sure you discuss any questions you have with your health care provider.  Document Revised: 04/09/2021 Document Reviewed: 04/09/2021  Elsevier Patient Education © 2021 [employer]

## 2021-09-30 NOTE — PROGRESS NOTES
"Chief Complaint  Hospital Follow Up Visit    Subjective          Lona Thomson presents to [facility] PRIMARY CARE  History of Present Illness     Hospital discharge follow-up from [facility].  She was admitted from September 9, 2021 to September 11, 2021.  She was admitted after a fall causing pain and discomfort in her right shoulder and right chest which was a mechanical fall.  She was found to have mildly displaced to nondisplaced right sided rib fractures (4-6) and a comminuted fracture at the inferior aspect of the scapula.  I reviewed the discharge summary, consultant notes, as well as the imaging reports and labs from her hospital visit.  She was seen by thoracic surgery and is following up with them on October 13, 2021.  At the time of discharge her pain was more adequately controlled.    Interval history:  Ribs feel okay but scapular pain is still bothersome.  Trying to control with Tylenol but not helping.  Aleve did not help much either.  She ran out of the celebrex and other pain meds about a week ago.  She drove herself today but admits to scapular pain being most bothersome.    Objective   Vital Signs:   /74 (BP Location: Left arm, Patient Position: Sitting, Cuff Size: Adult)   Pulse 79   Temp 98.4 °F (36.9 °C) (Temporal)   Resp 16   Ht 165.1 cm (65\")   Wt 52.1 kg (114 lb 12.8 oz)   SpO2 99%   BMI 19.10 kg/m²     Physical Exam  Vitals and nursing note reviewed.   Constitutional:       General: She is not in acute distress.     Appearance: Normal appearance.   Cardiovascular:      Rate and Rhythm: Normal rate and regular rhythm.      Heart sounds: Normal heart sounds. No murmur heard.     Pulmonary:      Effort: Pulmonary effort is normal.      Breath sounds: Normal breath sounds.   Chest:      Chest wall: No tenderness.   Musculoskeletal:      Right shoulder: Tenderness present.      Left shoulder: Normal.      Comments: There is some tenderness to " palpation around the inferior portion of the scapula.   Neurological:      Mental Status: She is alert.        Result Review :   The following data was reviewed by: Ludwin Herrmann MD on 09/30/2021:  Common labs    Common Labsle 2/8/21 2/8/21 2/8/21 9/9/21 9/9/21 9/10/21 9/10/21    0826 0826 0826 1441 1441 0330 0330   Glucose     134 (A)  105 (A)   Glucose  97        BUN  13   14  14   Creatinine  0.72   0.74  0.74   eGFR Non  Am  79   77  77   eGFR African Am  96        Sodium  137   139  143   Potassium  4.8   4.3  4.7   Chloride  100   101  104   Calcium  10.0   10.5  9.4   Total Protein  6.7        Albumin  4.40   4.80  4.00   Total Bilirubin  0.3   0.2  0.3   Alkaline Phosphatase  109   145 (A)  115   AST (SGOT)  21   17  11   ALT (SGPT)  15   24  18   WBC 7.45   13.27 (A)  9.17    Hemoglobin 15.0   16.0 (A)  14.7    Hematocrit 43.6   48.3 (A)  45.3    Platelets 363   395  322    Total Cholesterol   275 (A)       Triglycerides   82       HDL Cholesterol   71 (A)       LDL Cholesterol    191 (A)       (A) Abnormal value       Comments are available for some flowsheets but are not being displayed.                     Assessment and Plan    Diagnoses and all orders for this visit:    1. Closed fracture of multiple ribs of right side, initial encounter (Primary)  -     celecoxib (CeleBREX) 100 MG capsule; Take 1 capsule by mouth 2 (Two) Times a Day As Needed for Mild Pain .  Dispense: 60 capsule; Refill: 1    2. Closed fracture of right scapula, unspecified part of scapula, initial encounter  -     celecoxib (CeleBREX) 100 MG capsule; Take 1 capsule by mouth 2 (Two) Times a Day As Needed for Mild Pain .  Dispense: 60 capsule; Refill: 1      Her ribs do not seem to be bothering her as bad but her right scapula is giving her significant amount of pain.  It appears that she did better while she was on the Celebrex.  I will write her for an extended period of time on the Celebrex.  I counseled her not to use  NSAIDs with this medication but Tylenol would be fine.  She is going to follow-up with thoracic surgery in a couple weeks.  I counseled her to not ride any horses or any strenuous activity until thoracic surgery has evaluated her and cleared her.    I spent 40 minutes caring for Lona on this date of service. This time includes time spent by me in the following activities:preparing for the visit, reviewing tests, obtaining and/or reviewing a separately obtained history, performing a medically appropriate examination and/or evaluation , counseling and educating the patient/family/caregiver, ordering medications, tests, or procedures, documenting information in the medical record and independently interpreting results and communicating that information with the patient/family/caregiver  Follow Up   Return in about 5 months (around 2/14/2022) for Next scheduled follow up.  Patient was given instructions and counseling regarding her condition or for health maintenance advice. Please see specific information pulled into the AVS if appropriate.

## 2021-10-12 ENCOUNTER — TELEPHONE (OUTPATIENT)
Dept: SURGERY | Facility: CLINIC | Age: 76
End: 2021-10-12

## 2021-10-12 NOTE — TELEPHONE ENCOUNTER
Patient was left vm with details of appt with finn at 245 pm on 10/13 and was also reminded to get CXR before appt at [facility]. Patient was given instructions of where we are located and where to get the CXR at.

## 2021-10-13 ENCOUNTER — OFFICE VISIT (OUTPATIENT)
Dept: SURGERY | Facility: CLINIC | Age: 76
End: 2021-10-13

## 2021-10-13 ENCOUNTER — HOSPITAL ENCOUNTER (OUTPATIENT)
Dept: GENERAL RADIOLOGY | Facility: HOSPITAL | Age: 76
Discharge: HOME | End: 2021-10-13
Admitting: NURSE PRACTITIONER

## 2021-10-13 VITALS
SYSTOLIC BLOOD PRESSURE: 132 MMHG | WEIGHT: 114 LBS | BODY MASS INDEX: 18.99 KG/M2 | HEIGHT: 65 IN | DIASTOLIC BLOOD PRESSURE: 84 MMHG | HEART RATE: 77 BPM | OXYGEN SATURATION: 99 %

## 2021-10-13 DIAGNOSIS — S22.41XD CLOSED FRACTURE OF MULTIPLE RIBS OF RIGHT SIDE WITH ROUTINE HEALING, SUBSEQUENT ENCOUNTER: Primary | ICD-10-CM

## 2021-10-13 DIAGNOSIS — S22.41XA CLOSED FRACTURE OF MULTIPLE RIBS OF RIGHT SIDE, INITIAL ENCOUNTER: ICD-10-CM

## 2021-10-13 PROCEDURE — 71046 X-RAY EXAM CHEST 2 VIEWS: CPT

## 2021-10-13 PROCEDURE — 99212 OFFICE O/P EST SF 10 MIN: CPT | Performed by: NURSE PRACTITIONER

## 2021-10-13 NOTE — PROGRESS NOTES
"Chief Complaint  Rib fractures, follow-up    Subjective          Lona Thomson presents to [facility]ORAC[facility] SUR[facility]ERY for follow-up of multiple rib fractures.    History of Present Illness     Ms. Thomson is a pleasant 75-year-old female who was seen by our service during hospitalization last month after suffering a mechanical fall.  Work-up demonstrated fractures of the right fourth, fifth, and sixth ribs as well as a fracture of the right scapula.  During her hospitalization she was mobilizing independently and hemodynamically stable.  We recommended conservative management of her fractures with pain medication and stressed the importance of good pulmonary hygiene to reduce her risk of pneumonia.  The patient reports that she has done well since her hospitalization and is now walking her dog approximately 4 miles a day.  She does have some persistent right shoulder pain which she attributes to her scapular fracture but denies anything more than mild chest wall pain.  She presents today with a follow-up chest x-ray.  She reports no fever, chills, cough or hemoptysis.    Objective   Vital Signs:   /84 (BP Location: Right arm, Patient Position: Sitting, Cuff Size: Adult)   Pulse 77   Ht 165.1 cm (65\")   Wt 51.7 kg (114 lb)   SpO2 99%   BMI 18.97 kg/m²     Physical Exam  Vitals and nursing note reviewed.   Constitutional:       Appearance: Normal appearance.   HENT:      Head: Normocephalic and atraumatic.   Eyes:      General: No scleral icterus.     Conjunctiva/sclera: Conjunctivae normal.   Cardiovascular:      Rate and Rhythm: Normal rate and regular rhythm.   Pulmonary:      Effort: Pulmonary effort is normal.      Breath sounds: Normal breath sounds. No wheezing, rhonchi or rales.   Chest:      Chest wall: Tenderness (Minimal right chest wall tenderness to palpation) present.   Abdominal:      General: Bowel sounds are normal.      Palpations: Abdomen is soft. There is no mass. "   Musculoskeletal:         General: Tenderness (Right scapular/shoulder region; good range of motion) present. No deformity.      Cervical back: Neck supple.   Neurological:      General: No focal deficit present.      Mental Status: She is alert. Mental status is at baseline.   Psychiatric:         Mood and Affect: Mood normal.         Thought Content: Thought content normal.        Result Review :   The following data was reviewed by: CRYSTAL Hodges on 10/13/2021:    Data reviewed: Radiologic studies I reviewed the PA and lateral chest x-ray performed prior to patient's appointment today.  There are some deformity of the right fourth, fifth and sixth ribs and scapular wing with no acute cardiovascular pulmonary process.  And then reviewed the CT of the chest performed last month when the patient presented to the emergency department.  I reviewed the 3D reconstructive rib imaging at length with the patient, as well.  The right fourth, fifth and sixth ribs are fractured laterally with a comminuted fracture of the inferior aspect of the scapula.  There is no pneumothorax or evidence of pulmonary contusion.         Assessment and Plan    Diagnoses and all orders for this visit:    1. Closed fracture of multiple ribs of right side with routine healing, subsequent encounter (Primary)    Patient reports a satisfactory recovery from her right rib fractures.  She does continue to complain of some right posterior shoulder discomfort and attributes this to her scapular fracture.  I do not think she was evaluated by orthopedic surgery during her hospitalization.  She is likely to far out from her injury for surgical repair, however I recommended she see orthopedics if this pain does not resolve within the short-term.  She did demonstrate good range of motion and has been active.  She plans to initiate with physical therapy and will like to follow-up with Dr. Herrmann pending that course.  We will see her on an as-needed  basis.  Thank you for allowing us to participate in the care of Lona Thomson.  We are happy to see her again in the future at any time if needed.    I spent 18 minutes caring for Lona on this date of service. This time includes time spent by me in the following activities:preparing for the visit, reviewing tests, obtaining and/or reviewing a separately obtained history, performing a medically appropriate examination and/or evaluation , counseling and educating the patient/family/caregiver, ordering medications, tests, or procedures, referring and communicating with other health care professionals , documenting information in the medical record and independently interpreting results and communicating that information with the patient/family/caregiver.  Follow Up   No follow-ups on file.  Patient was given instructions and counseling regarding her condition or for health maintenance advice. Please see specific information pulled into the AVS if appropriate.

## 2021-11-03 RX ORDER — PROCHLORPERAZINE MALEATE 5 MG/1
TABLET ORAL
Qty: 60 TABLET | Refills: 2 | Status: SHIPPED | OUTPATIENT
Start: 2021-11-03 | End: 2022-03-08

## 2021-11-24 DIAGNOSIS — S42.101A CLOSED FRACTURE OF RIGHT SCAPULA, UNSPECIFIED PART OF SCAPULA, INITIAL ENCOUNTER: ICD-10-CM

## 2021-11-24 DIAGNOSIS — S22.41XA CLOSED FRACTURE OF MULTIPLE RIBS OF RIGHT SIDE, INITIAL ENCOUNTER: ICD-10-CM

## 2021-11-24 RX ORDER — CELECOXIB 100 MG/1
100 CAPSULE ORAL 2 TIMES DAILY PRN
Qty: 60 CAPSULE | Refills: 1 | Status: SHIPPED | OUTPATIENT
Start: 2021-11-24 | End: 2022-02-14

## 2021-12-13 ENCOUNTER — TELEPHONE (OUTPATIENT)
Dept: INTERNAL MEDICINE | Facility: CLINIC | Age: 76
End: 2021-12-13

## 2021-12-13 NOTE — TELEPHONE ENCOUNTER
I spoke with the pt and she has a lab ordered that needs to be done at a out pt lab which she has already made an appt for

## 2021-12-13 NOTE — TELEPHONE ENCOUNTER
Caller: Lona Thomson    Relationship: Self    Best call back number: [phone]    What orders are you requesting (i.e. lab or imaging): CTX LAB DRAW    In what timeframe would the patient need to come in: ASAP    Where will you receive your lab/imaging services: IN OFFICE    Additional notes: PATIENT IS GETTING TEETH PULLED ON Thursday, 12/16/2021 AND STATES SHE NEEDS A CTX LAB DRAW.     PLEASE CALL PATIENT TO LET HER KNOW IF THE LAB ORDERS GET ENTERED SO SHE CAN SCHEDULE TO COME IN FOR HER LAB DRAW.

## 2022-01-16 DIAGNOSIS — R11.0 NAUSEA: ICD-10-CM

## 2022-01-17 RX ORDER — OMEPRAZOLE 40 MG/1
CAPSULE, DELAYED RELEASE ORAL
Qty: 90 CAPSULE | Refills: 0 | Status: SHIPPED | OUTPATIENT
Start: 2022-01-17 | End: 2022-02-23 | Stop reason: SDUPTHER

## 2022-02-14 ENCOUNTER — OFFICE VISIT (OUTPATIENT)
Dept: INTERNAL MEDICINE | Facility: CLINIC | Age: 77
End: 2022-02-14

## 2022-02-14 VITALS
OXYGEN SATURATION: 100 % | WEIGHT: 118.4 LBS | TEMPERATURE: 97.1 F | BODY MASS INDEX: 19.73 KG/M2 | SYSTOLIC BLOOD PRESSURE: 133 MMHG | HEIGHT: 65 IN | DIASTOLIC BLOOD PRESSURE: 64 MMHG | HEART RATE: 73 BPM

## 2022-02-14 DIAGNOSIS — M20.41 ACQUIRED HAMMERTOE OF RIGHT FOOT: ICD-10-CM

## 2022-02-14 DIAGNOSIS — E78.01 FAMILIAL HYPERCHOLESTEREMIA: Primary | ICD-10-CM

## 2022-02-14 PROCEDURE — 99213 OFFICE O/P EST LOW 20 MIN: CPT | Performed by: FAMILY MEDICINE

## 2022-02-14 RX ORDER — ATORVASTATIN CALCIUM 20 MG/1
20 TABLET, FILM COATED ORAL NIGHTLY
Qty: 90 TABLET | Refills: 3 | Status: SHIPPED | OUTPATIENT
Start: 2022-02-14 | End: 2023-01-06 | Stop reason: SDUPTHER

## 2022-02-14 NOTE — PATIENT INSTRUCTIONS
"High Cholesterol    High cholesterol is a condition in which the blood has high levels of a white, waxy substance similar to fat (cholesterol). The liver makes all the cholesterol that the body needs. The human body needs small amounts of cholesterol to help build cells. A person gets extra or excess cholesterol from the food that he or she eats.  The blood carries cholesterol from the liver to the rest of the body. If you have high cholesterol, deposits (plaques) may build up on the walls of your arteries. Arteries are the blood vessels that carry blood away from your heart. These plaques make the arteries narrow and stiff.  Cholesterol plaques increase your risk for heart attack and stroke. Work with your health care provider to keep your cholesterol levels in a healthy range.  What increases the risk?  The following factors may make you more likely to develop this condition:  · Eating foods that are high in animal fat (saturated fat) or cholesterol.  · Being overweight.  · Not getting enough exercise.  · A family history of high cholesterol (familial hypercholesterolemia).  · Use of tobacco products.  · Having diabetes.  What are the signs or symptoms?  There are no symptoms of this condition.  How is this diagnosed?  This condition may be diagnosed based on the results of a blood test.  · If you are older than 20 years of age, your health care provider may check your cholesterol levels every 4-6 years.  · You may be checked more often if you have high cholesterol or other risk factors for heart disease.  The blood test for cholesterol measures:  · \"Bad\" cholesterol, or LDL cholesterol. This is the main type of cholesterol that causes heart disease. The desired level is less than 100 mg/dL.  · \"Good\" cholesterol, or HDL cholesterol. HDL helps protect against heart disease by cleaning the arteries and carrying the LDL to the liver for processing. The desired level for HDL is 60 mg/dL or higher.  · Triglycerides. " These are fats that your body can store or burn for energy. The desired level is less than 150 mg/dL.  · Total cholesterol. This measures the total amount of cholesterol in your blood and includes LDL, HDL, and triglycerides. The desired level is less than 200 mg/dL.  How is this treated?  This condition may be treated with:  · Diet changes. You may be asked to eat foods that have more fiber and less saturated fats or added sugar.  · Lifestyle changes. These may include regular exercise, maintaining a healthy weight, and quitting use of tobacco products.  · Medicines. These are given when diet and lifestyle changes have not worked. You may be prescribed a statin medicine to help lower your cholesterol levels.  Follow these instructions at home:  Eating and drinking    · Eat a healthy, balanced diet. This diet includes:  ? Daily servings of a variety of fresh, frozen, or canned fruits and vegetables.  ? Daily servings of whole grain foods that are rich in fiber.  ? Foods that are low in saturated fats and trans fats. These include poultry and fish without skin, lean cuts of meat, and low-fat dairy products.  ? A variety of fish, especially oily fish that contain omega-3 fatty acids. Aim to eat fish at least 2 times a week.  · Avoid foods and drinks that have added sugar.  · Use healthy cooking methods, such as roasting, grilling, broiling, baking, poaching, steaming, and stir-frying. Do not campos your food except for stir-frying.    Lifestyle    · Get regular exercise. Aim to exercise for a total of 150 minutes a week. Increase your activity level by doing activities such as gardening, walking, and taking the stairs.  · Do not use any products that contain nicotine or tobacco, such as cigarettes, e-cigarettes, and chewing tobacco. If you need help quitting, ask your health care provider.    General instructions  · Take over-the-counter and prescription medicines only as told by your health care provider.  · Keep all  "follow-up visits as told by your health care provider. This is important.  Where to find more information  · American Heart Association: www.heart.org  · National Heart, Lung, and Blood [place]: www.nhlbi.nih.gov  Contact a health care provider if:  · You have trouble achieving or maintaining a healthy diet or weight.  · You are starting an exercise program.  · You are unable to stop smoking.  Get help right away if:  · You have chest pain.  · You have trouble breathing.  · You have any symptoms of a stroke. \"BE FAST\" is an easy way to remember the main warning signs of a stroke:  ? B - Balance. Signs are dizziness, sudden trouble walking, or loss of balance.  ? E - Eyes. Signs are trouble seeing or a sudden change in vision.  ? F - Face. Signs are sudden weakness or numbness of the face, or the face or eyelid drooping on one side.  ? A - Arms. Signs are weakness or numbness in an arm. This happens suddenly and usually on one side of the body.  ? S - Speech. Signs are sudden trouble speaking, slurred speech, or trouble understanding what people say.  ? T - Time. Time to call emergency services. Write down what time symptoms started.  · You have other signs of a stroke, such as:  ? A sudden, severe headache with no known cause.  ? Nausea or vomiting.  ? Seizure.  These symptoms may represent a serious problem that is an emergency. Do not wait to see if the symptoms will go away. Get medical help right away. Call your local emergency services (911 in the [place] Do not drive yourself to the hospital.  Summary  · Cholesterol plaques increase your risk for heart attack and stroke. Work with your health care provider to keep your cholesterol levels in a healthy range.  · Eat a healthy, balanced diet, get regular exercise, and maintain a healthy weight.  · Do not use any products that contain nicotine or tobacco, such as cigarettes, e-cigarettes, and chewing tobacco.  · Get help right away if you have any symptoms of a " stroke.  This information is not intended to replace advice given to you by your health care provider. Make sure you discuss any questions you have with your health care provider.  Document Revised: 11/16/2020 Document Reviewed: 11/16/2020  Elsevier Patient Education © 2021 [employer]

## 2022-02-14 NOTE — PROGRESS NOTES
"Chief Complaint  Hyperlipidemia    Subjective          Lona Thomson presents to [facility] PRIMARY CARE  History of Present Illness     HLD-uncontrolled with a total cholesterol 303, , HDL 75.  Patient prescribed atorvastatin 20 mg but been off of since October.     Also would like a referral to a podiatrist for a hammetoe      Objective   Vital Signs:   /64 (BP Location: Left arm, Patient Position: Sitting, Cuff Size: Adult)   Pulse 73   Temp 97.1 °F (36.2 °C) (Temporal)   Ht 165.1 cm (65\")   Wt 53.7 kg (118 lb 6.4 oz)   SpO2 100%   BMI 19.70 kg/m²     Physical Exam  Vitals and nursing note reviewed.   Constitutional:       General: She is not in acute distress.     Appearance: Normal appearance.   Cardiovascular:      Rate and Rhythm: Normal rate and regular rhythm.      Heart sounds: Normal heart sounds. No murmur heard.      Pulmonary:      Effort: Pulmonary effort is normal.      Breath sounds: Normal breath sounds.   Neurological:      Mental Status: She is alert.        Result Review :   The following data was reviewed by: Ludwin Herrmann MD on 02/14/2022:  Common labs    Common Labsle 9/9/21 9/9/21 9/10/21 9/10/21 2/8/22 2/8/22 2/8/22    1441 1441 0330 0330 1055 1055 1055   Glucose  134 (A)  105 (A)  91    BUN  14  14  19    Creatinine  0.74  0.74  0.79    eGFR Non  Am  77  77  74    eGFR African Am      85    Sodium  139  143  142    Potassium  4.3  4.7  5.2    Chloride  101  104  103    Calcium  10.5  9.4  10.1    Total Protein      6.8    Albumin  4.80  4.00  4.7    Total Bilirubin  0.2  0.3  0.3    Alkaline Phosphatase  145 (A)  115  128 (A)    AST (SGOT)  17  11  23    ALT (SGPT)  24  18  26    WBC 13.27 (A)  9.17  7.0     Hemoglobin 16.0 (A)  14.7  15.1     Hematocrit 48.3 (A)  45.3  42.8     Platelets 395  322  395     Total Cholesterol       303 (A)   Triglycerides       104   HDL Cholesterol       75   LDL Cholesterol        210 (A)   (A) Abnormal " value       Comments are available for some flowsheets but are not being displayed.                     Assessment and Plan    Diagnoses and all orders for this visit:    1. Familial hypercholesteremia (Primary)  -     Comprehensive Metabolic Panel; Future  -     Lipid Panel With LDL / HDL Ratio; Future  -     atorvastatin (LIPITOR) 20 MG tablet; Take 1 tablet by mouth Every Night.  Dispense: 90 tablet; Refill: 3    2. Acquired hammertoe of right foot  -     Ambulatory Referral to Podiatry      Uncontrolled cholesterol.  She will restart the atorvastatin 20 mg and get her labs checked in 2 months.  Placed referral to podiatry for addressing the hammertoe.        Follow Up   Return in about 6 months (around 8/16/2022) for Medicare Wellness.  Patient was given instructions and counseling regarding her condition or for health maintenance advice. Please see specific information pulled into the AVS if appropriate.

## 2022-02-16 ENCOUNTER — TELEPHONE (OUTPATIENT)
Dept: INTERNAL MEDICINE | Facility: CLINIC | Age: 77
End: 2022-02-16

## 2022-02-16 NOTE — TELEPHONE ENCOUNTER
Provider: DR LI  Caller: ENE DESAI  Relationship to Patient: PATIENT    Phone Number: [phone]  Reason for Call: PATIENT WOULD LIKE THE NAME OF THE PODIATRIST THAT DR LI REFERRED HER TO.  [facility]

## 2022-02-23 ENCOUNTER — OFFICE VISIT (OUTPATIENT)
Dept: GASTROENTEROLOGY | Facility: CLINIC | Age: 77
End: 2022-02-23

## 2022-02-23 VITALS
TEMPERATURE: 96.9 F | WEIGHT: 119.2 LBS | DIASTOLIC BLOOD PRESSURE: 85 MMHG | HEART RATE: 78 BPM | HEIGHT: 66 IN | BODY MASS INDEX: 19.16 KG/M2 | OXYGEN SATURATION: 98 % | SYSTOLIC BLOOD PRESSURE: 144 MMHG

## 2022-02-23 DIAGNOSIS — K63.5 POLYP OF COLON, UNSPECIFIED PART OF COLON, UNSPECIFIED TYPE: ICD-10-CM

## 2022-02-23 DIAGNOSIS — R11.0 NAUSEA: ICD-10-CM

## 2022-02-23 DIAGNOSIS — D37.9 NEOPLASM OF UNCERTAIN BEHAVIOR OF DIGESTIVE ORGAN, UNSPECIFIED: ICD-10-CM

## 2022-02-23 DIAGNOSIS — K86.2 PANCREAS CYST: Primary | ICD-10-CM

## 2022-02-23 PROCEDURE — 99213 OFFICE O/P EST LOW 20 MIN: CPT | Performed by: INTERNAL MEDICINE

## 2022-02-23 RX ORDER — OMEPRAZOLE 40 MG/1
40 CAPSULE, DELAYED RELEASE ORAL DAILY
Qty: 90 CAPSULE | Refills: 3 | Status: SHIPPED | OUTPATIENT
Start: 2022-02-23

## 2022-02-23 NOTE — PROGRESS NOTES
Chief Complaint   Patient presents with   • Follow-up     Pancreas cyst       History of Present Illness:   76 y.o. female who has a history of a small cystic lesion in the uncinate process of the pancreas.  She last had a MRI of the abdomen in 1 of 2021 that showed:  IMPRESSION:  Sub-5 mm T2 hyperintense cyst within the uncinate process of  pancreas not changed in size from 2017.     This report was finalized on 1/18/2021 2:19 PM by Dr. David Pinto M.D.       She last had a CA 19-9 in 10 of 2020 that was normal at 9.       She feels fine. Walks 3.5 mi/day with her dog. No abdominal or chest pain. Occasionally nausea but no vomiting. She doesn't know what makes the nausea worse. Weight stable. No diarrhea or cosnotiaption. No rectal bleeding or melena.        She last had an EGD and colonoscopy in 12/19.    Past Medical History:   Diagnosis Date   • Bipolar affect, depressed (HCC)    • Cervical myelopathy (HCC)    • Colon polyp    • Depression    • Gastritis    • GERD (gastroesophageal reflux disease)    • H/O colonoscopy with polypectomy    • Migraine headache    • Pancreatic cyst     4 cm       Past Surgical History:   Procedure Laterality Date   • COLONOSCOPY  05/2013   • COLONOSCOPY N/A 10/31/2016    polyps, tics, IH, tubular adenoma w/low grade dysplasia x 2, hyperplastic polyp x3   • COLONOSCOPY N/A 12/9/2019    Procedure: COLONOSCOPY  WITH COLD BIOPSIES AND COLD SNARE POLYPECTOMY;  Surgeon: Thad Brumfield MD;  Location: [facility] ENDOSCOPY;  Service: Gastroenterology   • ENDOSCOPY  11/2013   • ENDOSCOPY N/A 10/31/2016    erythematous mucosa in stomach   • ENDOSCOPY N/A 12/9/2019    Procedure: ESOPHAGOGASTRODUODENOSCOPYEITH COLD BIOPSIES;  Surgeon: Thad Brumfield MD;  Location: [facility] ENDOSCOPY;  Service: Gastroenterology   • TONSILLECTOMY     • UPPER GASTROINTESTINAL ENDOSCOPY           Current Outpatient Medications:   •  alendronate (FOSAMAX) 70 MG tablet, Take 1 tablet by mouth Every 7 (Seven) Days., Disp:  12 tablet, Rfl: 4  •  atorvastatin (LIPITOR) 20 MG tablet, Take 1 tablet by mouth Every Night., Disp: 90 tablet, Rfl: 3  •  buPROPion SR (WELLBUTRIN SR) 150 MG 12 hr tablet, Take 150 mg by mouth 2 (Two) Times a Day., Disp: , Rfl:   •  buPROPion XL (WELLBUTRIN XL) 300 MG 24 hr tablet, Take 300 mg by mouth Daily., Disp: , Rfl:   •  Calcium Polycarbophil (FIBER-CAPS PO), Take 2 capsules by mouth Daily., Disp: , Rfl:   •  lamoTRIgine (LaMICtal) 100 MG tablet, Take 1.5 tablets by mouth 2 (Two) Times a Day., Disp: , Rfl:   •  LORazepam (ATIVAN) 0.5 MG tablet, Take 0.5 mg by mouth 2 (Two) Times a Day As Needed., Disp: , Rfl: 2  •  omeprazole (priLOSEC) 40 MG capsule, Take 1 capsule by mouth Daily., Disp: 90 capsule, Rfl: 3  •  prochlorperazine (COMPAZINE) 5 MG tablet, TAKE 1 TABLET BY MOUTH EVERY 6 (SIX) HOURS AS NEEDED FOR NAUSEA OR VOMITING., Disp: 60 tablet, Rfl: 2  •  vitamin D (ERGOCALCIFEROL) 1.25 MG (54015 UT) capsule capsule, Take 1 capsule by mouth 1 (One) Time Per Week., Disp: 12 capsule, Rfl: 0  •  Vortioxetine HBr (TRINTELLIX PO), Take 1 tablet by mouth Daily. 15 mg per day, Disp: , Rfl:     Allergies   Allergen Reactions   • Escitalopram Nausea Only     nausea   • Lexapro [Escitalopram Oxalate] GI Intolerance     nausea   • Prozac [Fluoxetine Hcl] Nausea Only       Family History   Problem Relation Age of Onset   • Stroke Mother    • Cancer Father    • Cancer Maternal Grandmother    • Stroke Maternal Grandmother        Social History     Socioeconomic History   • Marital status: Single   • Number of children: 0   Tobacco Use   • Smoking status: Former Smoker     Packs/day: 0.25     Years: 12.00     Pack years: 3.00     Types: Cigarettes     Quit date: 2019     Years since quittin.2   • Smokeless tobacco: Never Used   • Tobacco comment: We discussed nicotine patch, gum, hypnosis.   Substance and Sexual Activity   • Alcohol use: Yes     Comment: social   • Drug use: Never   • Sexual activity: Defer        Review of Systems   Gastrointestinal: Negative for abdominal pain and nausea.   All other systems reviewed and are negative.    Pertinent positives and negatives documented in the HPI and all other systems reviewed and were found to be negative.  Vitals:    02/23/22 1618   BP: 144/85   Pulse: 78   Temp: 96.9 °F (36.1 °C)   SpO2: 98%       Physical Exam  Vitals reviewed.   Constitutional:       General: She is not in acute distress.     Appearance: Normal appearance. She is well-developed. She is not diaphoretic.   HENT:      Head: Normocephalic and atraumatic. Hair is normal.      Right Ear: Hearing, tympanic membrane, ear canal and external ear normal. No decreased hearing noted. No drainage.      Left Ear: Hearing, tympanic membrane, ear canal and external ear normal. No decreased hearing noted.      Nose: Nose normal. No nasal deformity.      Mouth/Throat:      Mouth: Mucous membranes are moist.   Eyes:      General: Lids are normal.         Right eye: No discharge.         Left eye: No discharge.      Extraocular Movements: Extraocular movements intact.      Conjunctiva/sclera: Conjunctivae normal.      Pupils: Pupils are equal, round, and reactive to light.   Neck:      Thyroid: No thyromegaly.      Vascular: No JVD.      Trachea: No tracheal deviation.   Cardiovascular:      Rate and Rhythm: Normal rate and regular rhythm.      Pulses: Normal pulses.      Heart sounds: Normal heart sounds. No murmur heard.  No friction rub. No gallop.    Pulmonary:      Effort: Pulmonary effort is normal. No respiratory distress.      Breath sounds: Normal breath sounds. No wheezing or rales.   Chest:      Chest wall: No tenderness.   Abdominal:      General: Bowel sounds are normal. There is no distension.      Palpations: Abdomen is soft. There is no mass.      Tenderness: There is no abdominal tenderness. There is no guarding or rebound.      Hernia: No hernia is present.   Musculoskeletal:         General: No  tenderness or deformity. Normal range of motion.      Cervical back: Normal range of motion and neck supple.   Lymphadenopathy:      Cervical: No cervical adenopathy.   Skin:     General: Skin is warm and dry.      Findings: No erythema or rash.   Neurological:      Mental Status: She is alert and oriented to person, place, and time.      Cranial Nerves: No cranial nerve deficit.      Motor: No abnormal muscle tone.      Coordination: Coordination normal.      Deep Tendon Reflexes: Reflexes are normal and symmetric. Reflexes normal.   Psychiatric:         Mood and Affect: Mood normal.         Behavior: Behavior normal.         Thought Content: Thought content normal.         Judgment: Judgment normal.         Diagnoses and all orders for this visit:    1. Pancreas cyst (Primary)  -     MRI abdomen w wo contrast mrcp; Future  -     Cancer Antigen 19-9    2. Polyp of colon, unspecified part of colon, unspecified type    3. Nausea  -     Cancer Antigen 19-9  -     omeprazole (priLOSEC) 40 MG capsule; Take 1 capsule by mouth Daily.  Dispense: 90 capsule; Refill: 3    4. 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 H/o colon polyps. Her last colonoscopy was 12/19.   2. H/o pancreatic cystic mass.     Recommendations:  1. MRCP  2. ,   3. Continue Omeprazole 40 mg/day.  4. F/u 1 yr.     Return in about 1 year (around 2/23/2023).    Thad Brumfield MD  2/23/2022

## 2022-03-08 RX ORDER — PROCHLORPERAZINE MALEATE 5 MG/1
TABLET ORAL
Qty: 60 TABLET | Refills: 2 | Status: SHIPPED | OUTPATIENT
Start: 2022-03-08

## 2022-03-09 ENCOUNTER — LAB (OUTPATIENT)
Dept: GASTROENTEROLOGY | Facility: CLINIC | Age: 77
End: 2022-03-09

## 2022-03-10 LAB — CANCER AG19-9 SERPL-ACNC: 8 U/ML (ref 0–35)

## 2022-03-14 ENCOUNTER — TELEPHONE (OUTPATIENT)
Dept: GASTROENTEROLOGY | Facility: CLINIC | Age: 77
End: 2022-03-14

## 2022-03-14 NOTE — TELEPHONE ENCOUNTER
Called pt and left vm for pt to call back.     Results sent to Dr Herrmann thru [facility].

## 2022-03-14 NOTE — TELEPHONE ENCOUNTER
----- Message from Thad Brumfield MD sent at 3/12/2022  3:19 PM EST -----  03/12/22       Tell her that her  lab test (which is a pancreatic cancer tumor marker) came back normal again at 8. This is lower than it was one year ago (when it was 9). DAWNAI.       Send a copy of this report to her PCP.   Jorge stein

## 2022-04-06 ENCOUNTER — APPOINTMENT (OUTPATIENT)
Dept: MRI IMAGING | Facility: HOSPITAL | Age: 77
End: 2022-04-06

## 2022-05-12 ENCOUNTER — APPOINTMENT (OUTPATIENT)
Dept: MRI IMAGING | Facility: HOSPITAL | Age: 77
End: 2022-05-12

## 2022-06-03 ENCOUNTER — APPOINTMENT (OUTPATIENT)
Dept: MRI IMAGING | Facility: HOSPITAL | Age: 77
End: 2022-06-03

## 2022-06-30 ENCOUNTER — APPOINTMENT (OUTPATIENT)
Dept: MRI IMAGING | Facility: HOSPITAL | Age: 77
End: 2022-06-30

## 2022-07-12 ENCOUNTER — APPOINTMENT (OUTPATIENT)
Dept: MRI IMAGING | Facility: HOSPITAL | Age: 77
End: 2022-07-12

## 2022-07-27 ENCOUNTER — OFFICE VISIT (OUTPATIENT)
Dept: ORTHOPEDIC SURGERY | Facility: CLINIC | Age: 77
End: 2022-07-27

## 2022-07-27 VITALS — HEIGHT: 66 IN | BODY MASS INDEX: 19.13 KG/M2 | WEIGHT: 119 LBS | TEMPERATURE: 97 F

## 2022-07-27 DIAGNOSIS — M17.12 ARTHRITIS OF LEFT KNEE: ICD-10-CM

## 2022-07-27 DIAGNOSIS — M25.462 EFFUSION OF LEFT KNEE: ICD-10-CM

## 2022-07-27 DIAGNOSIS — R52 PAIN: Primary | ICD-10-CM

## 2022-07-27 PROCEDURE — 99203 OFFICE O/P NEW LOW 30 MIN: CPT | Performed by: ORTHOPAEDIC SURGERY

## 2022-07-27 PROCEDURE — 73562 X-RAY EXAM OF KNEE 3: CPT | Performed by: ORTHOPAEDIC SURGERY

## 2022-07-27 NOTE — PROGRESS NOTES
Patient Name: Lona Thomson   YOB: 1945  Referring Primary Care Physician: Ludwin Herrmann MD  BMI: Body mass index is 19.22 kg/m².    Chief Complaint:    Chief Complaint   Patient presents with   • Left Knee - Pain, Initial Evaluation        HPI:     Lona Thomson is a 76 y.o. female who presents today for evaluation of   Chief Complaint   Patient presents with   • Left Knee - Pain, Initial Evaluation   .  Patient is seen today complaining of some left knee pain and tightness that she is noted over the last month or so.  It is bothering her moderate fashion she has not really taken anything for it.  She walks 3-1/2 miles a day walking her [employer].  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  She grew up with Dr. Iker Smith.      Subjective   Medications:   Home Medications:  Current Outpatient Medications on File Prior to Visit   Medication Sig   • alendronate (FOSAMAX) 70 MG tablet Take 1 tablet by mouth Every 7 (Seven) Days.   • atorvastatin (LIPITOR) 20 MG tablet Take 1 tablet by mouth Every Night.   • buPROPion SR (WELLBUTRIN SR) 150 MG 12 hr tablet Take 150 mg by mouth 2 (Two) Times a Day.   • buPROPion XL (WELLBUTRIN XL) 300 MG 24 hr tablet Take 300 mg by mouth Daily.   • Calcium Polycarbophil (FIBER-CAPS PO) Take 2 capsules by mouth Daily.   • lamoTRIgine (LaMICtal) 100 MG tablet Take 1.5 tablets by mouth 2 (Two) Times a Day.   • LORazepam (ATIVAN) 0.5 MG tablet Take 0.5 mg by mouth 2 (Two) Times a Day As Needed.   • omeprazole (priLOSEC) 40 MG capsule Take 1 capsule by mouth Daily.   • prochlorperazine (COMPAZINE) 5 MG tablet TAKE 1 TABLET BY MOUTH EVERY 6 HOURS AS NEEDED FOR NAUSEA OR VOMITING.   • vitamin D (ERGOCALCIFEROL) 1.25 MG (71299 UT) capsule capsule Take 1 capsule by mouth 1 (One) Time Per Week.   • Vortioxetine HBr (TRINTELLIX PO) Take 1 tablet by mouth Daily. 15 mg per day      No current facility-administered medications on file prior to visit.     Current Medications:  Scheduled Meds:  Continuous Infusions:No current facility-administered medications for this visit.    PRN Meds:.    I have reviewed the patient's medical history in detail and updated the computerized patient record.  Review and summarization of old records includes:    Past Medical History:   Diagnosis Date   • Bipolar affect, depressed (HCC)    • Cervical myelopathy (HCC)    • Colon polyp    • Depression    • Gastritis    • GERD (gastroesophageal reflux disease)    • H/O colonoscopy with polypectomy    • Migraine headache    • Pancreatic cyst     4 cm        Past Surgical History:   Procedure Laterality Date   • COLONOSCOPY  2013   • COLONOSCOPY N/A 10/31/2016    polyps, tics, IH, tubular adenoma w/low grade dysplasia x 2, hyperplastic polyp x3   • COLONOSCOPY N/A 2019    Procedure: COLONOSCOPY  WITH COLD BIOPSIES AND COLD SNARE POLYPECTOMY;  Surgeon: Thad Brumfield MD;  Location: [facility] ENDOSCOPY;  Service: Gastroenterology   • ENDOSCOPY  2013   • ENDOSCOPY N/A 10/31/2016    erythematous mucosa in stomach   • ENDOSCOPY N/A 2019    Procedure: ESOPHAGOGASTRODUODENOSCOPYEITH COLD BIOPSIES;  Surgeon: Thad Brumfield MD;  Location: [facility] ENDOSCOPY;  Service: Gastroenterology   • TONSILLECTOMY     • UPPER GASTROINTESTINAL ENDOSCOPY          Social History     Occupational History   • Occupation:    Tobacco Use   • Smoking status: Former Smoker     Packs/day: 0.25     Years: 12.00     Pack years: 3.00     Types: Cigarettes     Quit date: 2019     Years since quittin.6   • Smokeless tobacco: Never Used   • Tobacco comment: We discussed nicotine patch, gum, hypnosis.   Substance and Sexual Activity   • Alcohol use: Yes     Comment: social   • Drug use: Never   • Sexual activity: Defer      Social History     Social History Narrative   • Not on file        Family History   Problem Relation Age  "of Onset   • Stroke Mother    • Cancer Father    • Cancer Maternal Grandmother    • Stroke Maternal Grandmother        ROS: 14 point review of systems was performed and all other systems were reviewed and are negative except for documented findings in HPI and today's encounter.     Allergies:   Allergies   Allergen Reactions   • Escitalopram Nausea Only     nausea   • Lexapro [Escitalopram Oxalate] GI Intolerance     nausea   • Prozac [Fluoxetine Hcl] Nausea Only     Constitutional:  Denies fever, shaking or chills   Eyes:  Denies change in visual acuity   HENT:  Denies nasal congestion or sore throat   Respiratory:  Denies cough or shortness of breath   Cardiovascular:  Denies chest pain or severe LE edema   GI:  Denies abdominal pain, nausea, vomiting, bloody stools or diarrhea   Musculoskeletal:  Numbness, tingling, pain, or loss of motor function only as noted above in history of present illness.  : Denies painful urination or hematuria  Integument:  Denies rash, lesion or ulceration   Neurologic:  Denies headache or focal weakness  Endocrine:  Denies lymphadenopathy  Psych:  Denies confusion or change in mental status   Hem:  Denies active bleeding    OBJECTIVE:  Physical Exam: 76 y.o. female  Wt Readings from Last 3 Encounters:   07/27/22 54 kg (119 lb)   02/23/22 54.1 kg (119 lb 3.2 oz)   02/14/22 53.7 kg (118 lb 6.4 oz)     Ht Readings from Last 1 Encounters:   07/27/22 167.6 cm (65.98\")     Body mass index is 19.22 kg/m².  Vitals:    07/27/22 1339   Temp: 97 °F (36.1 °C)     Vital signs reviewed.     General Appearance:    Alert, cooperative, in no acute distress                  Eyes: conjunctiva clear  ENT: external ears and nose atraumatic  CV: no peripheral edema  Resp: normal respiratory effort  Skin: no rashes or wounds; normal turgor  Psych: mood and affect appropriate  Lymph: no nodes appreciated  Neuro: gross sensation intact  Vascular:  Palpable peripheral pulse in noted " extremity  Musculoskeletal Extremities: Exam today shows mild to moderate effusion the left versus the right knee shows a bit of crepitation she has good range of motion slight varus orientation hips noncontributory to the exam does not really have a lot of joint line tenderness    Radiology:   AP lateral 40 degree PA x-ray left knee taken the office today for complaints of pain without comparison views readily available shows moderate arthritic change.  There is a loose body noted posterior laterally in the left knee however is not complaining of any locking or mechanical symptoms        Assessment:     ICD-10-CM ICD-9-CM   1. Pain  R52 780.96   2. Arthritis of left knee  M17.12 716.96   3. Effusion of left knee  M25.462 719.06        MDM/Plan:   The diagnosis(es), natural history, pathophysiology and treatment for diagnosis(es) were discussed. Opportunity given and questions answered.  Biomechanics of pertinent body areas discussed.  When appropriate, the use of ambulatory aids discussed.    EXERCISES:  Advice on benefits of, and types of regular/moderate exercise pertaining to orthopedic diagnosis(es).  MEDICATIONS:  The risks, benefits, warnings,side effects and alternatives of medications discussed.  Inflammation/pain control; with cold, heat, elevation and/or liniments discussed as appropriate  Will try some anti-inflammatories with precautions and warnings if she was having weakness we could put her in therapy and we could also consider injection of her knee should she not get better.  She was well relieved and would see us back as necessary  7/27/2022    Dictated utilizing Dragon dictation

## 2022-08-25 ENCOUNTER — HOSPITAL ENCOUNTER (OUTPATIENT)
Dept: MRI IMAGING | Facility: HOSPITAL | Age: 77
Discharge: HOME | End: 2022-08-25
Admitting: INTERNAL MEDICINE

## 2022-08-25 DIAGNOSIS — K86.2 PANCREAS CYST: ICD-10-CM

## 2022-08-25 LAB — CREAT BLDA-MCNC: 0.8 MG/DL (ref 0.6–1.3)

## 2022-08-25 PROCEDURE — A9577 INJ MULTIHANCE: HCPCS | Performed by: INTERNAL MEDICINE

## 2022-08-25 PROCEDURE — 82565 ASSAY OF CREATININE: CPT

## 2022-08-25 PROCEDURE — 74183 MRI ABD W/O CNTR FLWD CNTR: CPT

## 2022-08-25 PROCEDURE — 0 GADOBENATE DIMEGLUMINE 529 MG/ML SOLUTION: Performed by: INTERNAL MEDICINE

## 2022-08-25 RX ADMIN — GADOBENATE DIMEGLUMINE 10 ML: 529 INJECTION, SOLUTION INTRAVENOUS at 12:25

## 2022-09-12 ENCOUNTER — TELEPHONE (OUTPATIENT)
Dept: GASTROENTEROLOGY | Facility: CLINIC | Age: 77
End: 2022-09-12

## 2022-09-12 NOTE — TELEPHONE ENCOUNTER
Call to pt.  Advise per Dr Brumfield note.  Verb understanding.     MRI for 8/25/24 placed in recall.      Update to Ludwin Herrmann.

## 2022-09-12 NOTE — TELEPHONE ENCOUNTER
----- Message from Thad Brumfield MD sent at 9/1/2022  5:26 PM EDT -----  09/01/22       Tell her that her MRI of the abdomen shows that the small (<5 mm in diameter) cystic lesion in her pancreas is unchanged since 2017.  The radiologist is recommending that we repeat an MRI of the abdomen in 2 years.  We can discuss this when I see her in follow-up in the office later this month.       Please send a copy of this report to her PCP.  Jorge stein

## 2022-09-29 DIAGNOSIS — M81.6 LOCALIZED OSTEOPOROSIS WITHOUT CURRENT PATHOLOGICAL FRACTURE: ICD-10-CM

## 2022-09-29 DIAGNOSIS — M85.89 OSTEOPENIA OF MULTIPLE SITES: ICD-10-CM

## 2022-09-29 RX ORDER — ALENDRONATE SODIUM 70 MG/1
70 TABLET ORAL
Qty: 12 TABLET | Refills: 4 | Status: SHIPPED | OUTPATIENT
Start: 2022-09-29

## 2022-10-21 ENCOUNTER — TELEPHONE (OUTPATIENT)
Dept: INTERNAL MEDICINE | Facility: CLINIC | Age: 77
End: 2022-10-21

## 2022-10-21 NOTE — TELEPHONE ENCOUNTER
Caller: Lona Thomson    Relationship: Self    Best call back number: [phone]    What orders are you requesting (i.e. lab or imaging): LABS     In what timeframe would the patient need to come in: BEFORE APPOINTMENT ON 01/05    Where will you receive your lab/imaging services: [facility]

## 2022-10-22 NOTE — TELEPHONE ENCOUNTER
It appears the October appoint was a no show and that is usually when I enter labs for the subsequent visit.  I would rather she contact me a few weeks prior to her next lab appointment and I will put in.  It would be possible other issues may show up by then that would require labs.

## 2022-10-31 ENCOUNTER — TELEMEDICINE (OUTPATIENT)
Dept: INTERNAL MEDICINE | Facility: CLINIC | Age: 77
End: 2022-10-31

## 2022-10-31 ENCOUNTER — TELEPHONE (OUTPATIENT)
Dept: INTERNAL MEDICINE | Facility: CLINIC | Age: 77
End: 2022-10-31

## 2022-10-31 DIAGNOSIS — U07.1 COVID: Primary | ICD-10-CM

## 2022-10-31 PROCEDURE — 99442 PR PHYS/QHP TELEPHONE EVALUATION 11-20 MIN: CPT

## 2022-10-31 NOTE — PATIENT INSTRUCTIONS
Isolate 5 days starting day of positive test result. After isolation period ends, wear a mask when around others for 5 days. Tylenol for fever. Mucinex daily for thinning of nasal secretions. May take Aleve for muscle aches. Encourage nutrition. Stay hydrated with water. Rest.    Seek emergency medical treatment for chest pain, difficulty breathing with a sense of panic or bluish discoloration around mouth or fingertips.

## 2022-10-31 NOTE — PROGRESS NOTES
"Chief Complaint  COVID    Subjective        Lona Thomson presents to [facility]  History of Present Illness  76 y.o. female presenting via video visit after a positive at-home COVID result. Pt of Ludwin Herrmann MD. 10/30, started feeling tired with a sore throat. Progressed to nasal congestion, cough and \"feeling icky\". Tested at on 10/30 and was positive. Recently received 3 booster and flu vaccine. Treating with Aleve. Denies fever, difficulty breathing, changes in taste or smell, ear pain.      Objective   Vital Signs:  There were no vitals taken for this visit.  Estimated body mass index is 19.14 kg/m² as calculated from the following:    Height as of 10/9/22: 165.1 cm (65\").    Weight as of 10/9/22: 52.2 kg (115 lb).    BMI is within normal parameters. No other follow-up for BMI required.      Physical Exam  Neurological:      General: No focal deficit present.      Mental Status: She is alert and oriented to person, place, and time.   Psychiatric:         Mood and Affect: Mood normal.         Behavior: Behavior normal.         Thought Content: Thought content normal.         Judgment: Judgment normal.        Result Review :    Common labs    Common Labs 2/8/22 2/8/22 2/8/22 8/25/22    1055 1055 1055    Glucose  91     BUN  19     Creatinine  0.79  0.80   eGFR Non  Am  74     eGFR African Am  85     Sodium  142     Potassium  5.2     Chloride  103     Calcium  10.1     Total Protein  6.8     Albumin  4.7     Total Bilirubin  0.3     Alkaline Phosphatase  128 (A)     AST (SGOT)  23     ALT (SGPT)  26     WBC 7.0      Hemoglobin 15.1      Hematocrit 42.8      Platelets 395      Total Cholesterol   303 (A)    Triglycerides   104    HDL Cholesterol   75    LDL Cholesterol    210 (A)    (A) Abnormal value       Comments are available for some flowsheets but are not being displayed.           Current Outpatient Medications on File Prior to Visit   Medication Sig Dispense " Refill   • alendronate (FOSAMAX) 70 MG tablet TAKE 1 TABLET BY MOUTH EVERY 7 (SEVEN) DAYS. 12 tablet 4   • atorvastatin (LIPITOR) 20 MG tablet Take 1 tablet by mouth Every Night. 90 tablet 3   • buPROPion SR (WELLBUTRIN SR) 150 MG 12 hr tablet Take 150 mg by mouth 2 (Two) Times a Day.     • Calcium Polycarbophil (FIBER-CAPS PO) Take 2 capsules by mouth Daily.     • lamoTRIgine (LaMICtal) 100 MG tablet Take 1.5 tablets by mouth 2 (Two) Times a Day.     • LORazepam (ATIVAN) 0.5 MG tablet Take 0.5 mg by mouth 2 (Two) Times a Day As Needed.  2   • metroNIDAZOLE (METROCREAM) 0.75 % cream APPLY TO SKIN TWICE DAILY FOR 6 WEEKS AS DIRECTED     • omeprazole (priLOSEC) 40 MG capsule Take 1 capsule by mouth Daily. 90 capsule 3   • prochlorperazine (COMPAZINE) 5 MG tablet TAKE 1 TABLET BY MOUTH EVERY 6 HOURS AS NEEDED FOR NAUSEA OR VOMITING. 60 tablet 2   • vitamin D (ERGOCALCIFEROL) 1.25 MG (07744 UT) capsule capsule Take 1 capsule by mouth 1 (One) Time Per Week. 12 capsule 0   • Vortioxetine HBr (TRINTELLIX PO) Take 1 tablet by mouth Daily. 15 mg per day       No current facility-administered medications on file prior to visit.                 Assessment and Plan   Diagnoses and all orders for this visit:    1. COVID (Primary)    Isolate 5 days starting day of positive test result. After isolation period ends, wear a mask when around others for 5 days. Tylenol for fever. Mucinex daily for thinning of nasal secretions. May take Aleve for muscle aches. Encourage nutrition. Stay hydrated with water. Rest.    Seek emergency medical treatment for chest pain, difficulty breathing with a sense of panic or bluish discoloration around mouth or fingertips.           Follow Up   No follow-ups on file.  Patient was given instructions and counseling regarding her condition or for health maintenance advice. Please see specific information pulled into the AVS if appropriate.     You have chosen to receive care through a telehealth visit.   Do you consent to use a video/audio connection for your medical care today? Yes    Unable to complete visit using a video connection to the patient. A phone visit was used to complete this visits.     During visit, patient was at her residence and provider at medical practice office.    Visit with patient took 17 minutes with time including attempting video connection, phone conversation, interviewing patient, reviewing history and discussing treatment plan.

## 2022-10-31 NOTE — TELEPHONE ENCOUNTER
Caller: Lona Thomson    Relationship to patient: Self    Best call back number: [phone]    Date of positive COVID19 test: 10/30/22    Date if possible COVID19 exposure: UNKNOWN    COVID19 symptoms: SORE THROAT, FEELS LIKE SHE HAS THE FLU, NASAL CONGESTION, DRY COUGH      Date of initial quarantine: YESTERDAY     Additional information or concerns: WOULD LIKE TO KNOW IF SHE IS A CANDIDATE OF PAXLOVID. ALSO WOULD LIKE TO KNOW SINCE SHE HAS HAD ALL OF HER BOOSTERS WOULD SHE GET AS BAD OF A CASE AS UNVACCINATED PERSONS.      What is the patients preferred pharmacy:   [id number] - [place], [place] - [address] AT IN [facility] [phone] [facility] [phone] FX

## 2022-11-04 ENCOUNTER — TELEPHONE (OUTPATIENT)
Dept: INTERNAL MEDICINE | Facility: CLINIC | Age: 77
End: 2022-11-04

## 2022-11-04 NOTE — TELEPHONE ENCOUNTER
Caller: Lona Thomson    Relationship: Self    Best call back number: [phone]     What is the medical concern/diagnosis: INJURED RIGHT WRIST/HAND    Any additional details:     PATIENT IS WANTING A REFERRAL TO SEE SOMEONE FOR HER WRIST/HAND.  SHE TRIPPED OVER HER DOG AND AND FELL ON A GRAVELED ROAD AND TRIED TO CATCH HERSELF.  THIS HAS BEEN OVER 4 WEEKS AND SHE IS STILL HAVING TROUBLE USING THE HAND.    PLEASE CALL PATIENT AND ADVISE.

## 2022-11-16 ENCOUNTER — TELEPHONE (OUTPATIENT)
Dept: INTERNAL MEDICINE | Facility: CLINIC | Age: 77
End: 2022-11-16

## 2022-11-16 NOTE — TELEPHONE ENCOUNTER
Caller: Lona Thomson    Relationship: Self    Best call back number: [phone] (Mobile)    What medication are you requesting: SOMETHING FOR A CHEST COLD     What are your current symptoms: COUGHING, FEVER YESTERDAY, TIRED.     How long have you been experiencing symptoms: SYMPTOMS STARTED Saturday.    Have you had these symptoms before:    [] Yes  [] No    Have you been treated for these symptoms before:   [] Yes  [] No    If a prescription is needed, what is your preferred pharmacy and phone number:  [id number] - [place], [place] - [address] AT IN [facility] [phone] [facility] [phone]         Additional notes: PLEASE CONTACT PATIENT TO ADVISE.

## 2022-11-22 ENCOUNTER — HOSPITAL ENCOUNTER (OUTPATIENT)
Dept: GENERAL RADIOLOGY | Facility: HOSPITAL | Age: 77
Discharge: HOME | End: 2022-11-22

## 2022-11-22 ENCOUNTER — OFFICE VISIT (OUTPATIENT)
Dept: INTERNAL MEDICINE | Facility: CLINIC | Age: 77
End: 2022-11-22

## 2022-11-22 VITALS
BODY MASS INDEX: 20.33 KG/M2 | WEIGHT: 122 LBS | TEMPERATURE: 98.7 F | HEART RATE: 76 BPM | DIASTOLIC BLOOD PRESSURE: 82 MMHG | SYSTOLIC BLOOD PRESSURE: 140 MMHG | HEIGHT: 65 IN | OXYGEN SATURATION: 96 %

## 2022-11-22 DIAGNOSIS — M79.641 RIGHT HAND PAIN: ICD-10-CM

## 2022-11-22 DIAGNOSIS — M79.641 RIGHT HAND PAIN: Primary | ICD-10-CM

## 2022-11-22 PROCEDURE — 73130 X-RAY EXAM OF HAND: CPT

## 2022-11-22 PROCEDURE — 73110 X-RAY EXAM OF WRIST: CPT

## 2022-11-22 PROCEDURE — 99213 OFFICE O/P EST LOW 20 MIN: CPT | Performed by: NURSE PRACTITIONER

## 2022-11-22 NOTE — PROGRESS NOTES
"Chief Complaint  Hand Pain (Right. Pt states she has a hard gripping things) and Fall    Subjective        Lona Thomson presents to [facility]  History of Present Illness  Patient presents for evaluation of right hand pain.  This is a 77-year-old female patient of Dr. Herrmann.    She fell forward onto gravel 6 weeks ago.  She braced her fall with her right hand.  She had acute swelling which has improved over the past 6 weeks but she has persistent pain with pincer grasp, trying to use a pen to sign her name, pushing on objects or pushing a door open, instability of the thumb joint, pain with picking up a glass of liquid or trying to  anything.  No erythema present and denies any retained foreign bodies to the area.    Denies development of other new issues today.      Objective   Vital Signs:  /82 (BP Location: Left arm, Patient Position: Sitting, Cuff Size: Adult)   Pulse 76   Temp 98.7 °F (37.1 °C) (Infrared)   Ht 165.1 cm (65\")   Wt 55.3 kg (122 lb)   SpO2 96%   BMI 20.30 kg/m²   Estimated body mass index is 20.3 kg/m² as calculated from the following:    Height as of this encounter: 165.1 cm (65\").    Weight as of this encounter: 55.3 kg (122 lb).    BMI is within normal parameters. No other follow-up for BMI required.      Physical Exam  Constitutional:       General: She is not in acute distress.     Appearance: She is not ill-appearing, toxic-appearing or diaphoretic.   Cardiovascular:      Rate and Rhythm: Normal rate.      Pulses: Normal pulses.      Comments: Right radial pulse within normal limits  Pulmonary:      Effort: Pulmonary effort is normal.   Musculoskeletal:      Right hand: Bony tenderness present. Decreased range of motion. Normal sensation. Normal capillary refill. Normal pulse.   Neurological:      General: No focal deficit present.      Mental Status: She is alert. Mental status is at baseline.   Psychiatric:         Mood and Affect: Mood " normal.         Thought Content: Thought content normal.         Judgment: Judgment normal.        Result Review :  The following data was reviewed by: CRYSTAL Floyd on 11/22/2022:  Common labs    Common Labs 2/8/22 2/8/22 2/8/22 8/25/22    1055 1055 1055    Glucose  91     BUN  19     Creatinine  0.79  0.80   eGFR Non  Am  74     eGFR African Am  85     Sodium  142     Potassium  5.2     Chloride  103     Calcium  10.1     Total Protein  6.8     Albumin  4.7     Total Bilirubin  0.3     Alkaline Phosphatase  128 (A)     AST (SGOT)  23     ALT (SGPT)  26     WBC 7.0      Hemoglobin 15.1      Hematocrit 42.8      Platelets 395      Total Cholesterol   303 (A)    Triglycerides   104    HDL Cholesterol   75    LDL Cholesterol    210 (A)    (A) Abnormal value       Comments are available for some flowsheets but are not being displayed.           Current outpatient and discharge medications have been reconciled for the patient.  Reviewed by: CRYSTAL Floyd           Assessment and Plan   Diagnoses and all orders for this visit:    1. Right hand pain (Primary)  -     XR Wrist 3+ View Right; Future  -     XR Hand 3+ View Right; Future    I am concerned with soft tissue injury potentially to the flexor/extensor for the right hand/thumb.  I will get x-rays of the right wrist and right hand for evaluation of the bones and to assess for any obvious soft tissue injury.  Once these results are back I will provide further recommendations, likely a consult with a hand specialist.    We will contact patient with results and further recommendations.  Follow-up as needed and routinely with PCP, Dr. Herrmann.         Follow Up   Return if symptoms worsen or fail to improve, for Next scheduled follow up.  Patient was given instructions and counseling regarding her condition or for health maintenance advice. Please see specific information pulled into the AVS if appropriate.

## 2022-11-23 DIAGNOSIS — S62.001A CLOSED NONDISPLACED FRACTURE OF SCAPHOID OF RIGHT WRIST, UNSPECIFIED PORTION OF SCAPHOID, INITIAL ENCOUNTER: Primary | ICD-10-CM

## 2022-11-23 DIAGNOSIS — S66.801A: ICD-10-CM

## 2022-11-23 NOTE — PROGRESS NOTES
Good afternoon Ms. Thomson, your x-ray of the right wrist and hand is showing a scaphoid fracture with some possible evidence of ligament injury.  I am going to place a referral to a hand specialist for further evaluation and recommendations.  Let us know if you have any questions in the meantime and have a great day,    CRYSTAL Floyd

## 2023-01-04 ENCOUNTER — TELEPHONE (OUTPATIENT)
Dept: INTERNAL MEDICINE | Facility: CLINIC | Age: 78
End: 2023-01-04

## 2023-01-04 NOTE — TELEPHONE ENCOUNTER
Caller: Ene Thomson    Relationship to patient: Self    Best call back number: [phone]     Patient is needing:     ENE HAD EXPOSURE TO FLU, HER SYMPTOMS ARE VOMITING, HEADACHE  , SHE IS WANTING TO KNOW IF SHE CAN STILL HAVE HER MEDICARE WELLNESS 1/5/2022

## 2023-01-05 ENCOUNTER — OFFICE VISIT (OUTPATIENT)
Dept: INTERNAL MEDICINE | Facility: CLINIC | Age: 78
End: 2023-01-05
Payer: MEDICARE

## 2023-01-05 VITALS
DIASTOLIC BLOOD PRESSURE: 80 MMHG | SYSTOLIC BLOOD PRESSURE: 122 MMHG | WEIGHT: 118 LBS | BODY MASS INDEX: 19.66 KG/M2 | HEIGHT: 65 IN | RESPIRATION RATE: 18 BRPM | TEMPERATURE: 98.2 F | OXYGEN SATURATION: 98 % | HEART RATE: 72 BPM

## 2023-01-05 DIAGNOSIS — F51.01 PRIMARY INSOMNIA: ICD-10-CM

## 2023-01-05 DIAGNOSIS — E78.01 FAMILIAL HYPERCHOLESTEREMIA: Chronic | ICD-10-CM

## 2023-01-05 DIAGNOSIS — Z00.00 MEDICARE ANNUAL WELLNESS VISIT, SUBSEQUENT: Primary | ICD-10-CM

## 2023-01-05 DIAGNOSIS — E55.9 VITAMIN D DEFICIENCY: ICD-10-CM

## 2023-01-05 PROCEDURE — 1126F AMNT PAIN NOTED NONE PRSNT: CPT | Performed by: FAMILY MEDICINE

## 2023-01-05 PROCEDURE — 1170F FXNL STATUS ASSESSED: CPT | Performed by: FAMILY MEDICINE

## 2023-01-05 PROCEDURE — G0439 PPPS, SUBSEQ VISIT: HCPCS | Performed by: FAMILY MEDICINE

## 2023-01-05 PROCEDURE — 99214 OFFICE O/P EST MOD 30 MIN: CPT | Performed by: FAMILY MEDICINE

## 2023-01-05 PROCEDURE — 1159F MED LIST DOCD IN RCRD: CPT | Performed by: FAMILY MEDICINE

## 2023-01-06 DIAGNOSIS — E55.9 VITAMIN D DEFICIENCY: ICD-10-CM

## 2023-01-06 DIAGNOSIS — E78.01 FAMILIAL HYPERCHOLESTEREMIA: ICD-10-CM

## 2023-01-06 LAB
25(OH)D3+25(OH)D2 SERPL-MCNC: 49.1 NG/ML (ref 30–100)
ALBUMIN SERPL-MCNC: 4.7 G/DL (ref 3.5–5.2)
ALBUMIN/GLOB SERPL: 2 G/DL
ALP SERPL-CCNC: 145 U/L (ref 39–117)
ALT SERPL-CCNC: 16 U/L (ref 1–33)
AST SERPL-CCNC: 19 U/L (ref 1–32)
BILIRUB SERPL-MCNC: 0.3 MG/DL (ref 0–1.2)
BUN SERPL-MCNC: 12 MG/DL (ref 8–23)
BUN/CREAT SERPL: 14.8 (ref 7–25)
CALCIUM SERPL-MCNC: 10.3 MG/DL (ref 8.6–10.5)
CHLORIDE SERPL-SCNC: 102 MMOL/L (ref 98–107)
CHOLEST SERPL-MCNC: 185 MG/DL (ref 0–200)
CO2 SERPL-SCNC: 29.2 MMOL/L (ref 22–29)
CREAT SERPL-MCNC: 0.81 MG/DL (ref 0.57–1)
EGFRCR SERPLBLD CKD-EPI 2021: 75.8 ML/MIN/1.73
ERYTHROCYTE [DISTWIDTH] IN BLOOD BY AUTOMATED COUNT: 12.2 % (ref 12.3–15.4)
GLOBULIN SER CALC-MCNC: 2.4 GM/DL
GLUCOSE SERPL-MCNC: 94 MG/DL (ref 65–99)
HCT VFR BLD AUTO: 43.3 % (ref 34–46.6)
HDLC SERPL-MCNC: 66 MG/DL (ref 40–60)
HGB BLD-MCNC: 14.9 G/DL (ref 12–15.9)
LDLC SERPL CALC-MCNC: 104 MG/DL (ref 0–100)
LDLC/HDLC SERPL: 1.55 {RATIO}
MCH RBC QN AUTO: 32.8 PG (ref 26.6–33)
MCHC RBC AUTO-ENTMCNC: 34.4 G/DL (ref 31.5–35.7)
MCV RBC AUTO: 95.4 FL (ref 79–97)
PLATELET # BLD AUTO: 359 10*3/MM3 (ref 140–450)
POTASSIUM SERPL-SCNC: 4.4 MMOL/L (ref 3.5–5.2)
PROT SERPL-MCNC: 7.1 G/DL (ref 6–8.5)
RBC # BLD AUTO: 4.54 10*6/MM3 (ref 3.77–5.28)
SODIUM SERPL-SCNC: 142 MMOL/L (ref 136–145)
TRIGL SERPL-MCNC: 84 MG/DL (ref 0–150)
VLDLC SERPL CALC-MCNC: 15 MG/DL (ref 5–40)
WBC # BLD AUTO: 8.81 10*3/MM3 (ref 3.4–10.8)

## 2023-01-06 RX ORDER — ATORVASTATIN CALCIUM 20 MG/1
20 TABLET, FILM COATED ORAL NIGHTLY
Qty: 90 TABLET | Refills: 4 | Status: SHIPPED | OUTPATIENT
Start: 2023-01-06

## 2023-01-06 RX ORDER — ERGOCALCIFEROL 1.25 MG/1
50000 CAPSULE ORAL WEEKLY
Qty: 12 CAPSULE | Refills: 4 | Status: SHIPPED | OUTPATIENT
Start: 2023-01-06

## 2023-01-13 NOTE — TELEPHONE ENCOUNTER
----- Message from Thad Brumfield MD sent at 1/11/2017  9:04 AM EST -----  Please tell her that the small bowel follow-through and the Kinevac HIDA scan were both normal.  The CAT scan of the abdomen showed a stable, unchanged 4 mm cyst in the pancreas.  I plan is to follow this with periodic CTs of the abdomen.  My plan would be to do another CAT scan of the abdomen in 6 months.  The radiologist and will compare 1 CAT scan to the other.  I would have the patient follow-up with me in the office a week after the CAT scan of the abdomen is done in approximately drop July 2017.  If she has questions about this let me know.  I can either call her or she can see me in the office and we can discuss.   Yes

## 2023-02-22 ENCOUNTER — OFFICE VISIT (OUTPATIENT)
Dept: GASTROENTEROLOGY | Facility: CLINIC | Age: 78
End: 2023-02-22
Payer: MEDICARE

## 2023-02-22 VITALS
TEMPERATURE: 97.7 F | HEART RATE: 69 BPM | DIASTOLIC BLOOD PRESSURE: 84 MMHG | HEIGHT: 65 IN | SYSTOLIC BLOOD PRESSURE: 148 MMHG | BODY MASS INDEX: 19.09 KG/M2 | WEIGHT: 114.6 LBS | OXYGEN SATURATION: 97 %

## 2023-02-22 DIAGNOSIS — K63.5 POLYP OF COLON, UNSPECIFIED PART OF COLON, UNSPECIFIED TYPE: ICD-10-CM

## 2023-02-22 DIAGNOSIS — K86.2 PANCREAS CYST: Primary | ICD-10-CM

## 2023-02-22 PROCEDURE — 99213 OFFICE O/P EST LOW 20 MIN: CPT | Performed by: INTERNAL MEDICINE

## 2023-02-22 NOTE — PROGRESS NOTES
Chief Complaint   Patient presents with   • MRI results follow up       History of Present Illness:   77 y.o. female who has a history of a small cystic lesion in the uncinate process of the pancreas.   She was last seen by me in 2 of 2022:  Assessment:  1. H/o colon polyps. Her last colonoscopy was 12/19.   2. H/o pancreatic cystic mass.      Recommendations:  1. MRCP  2. ,   3. Continue Omeprazole 40 mg/day.  4. F/u 1 yr.        Her last MRCP was done in 8 of 2022:  IMPRESSION  1.  Stable sub-5 mm cystic lesion within the uncinate process of  pancreas since 2017. Follow-up with MRI of the abdomen in 2 years is  recommended per ACR criteria.  2.  Other findings as above     This report was finalized on 8/30/2022 11:33 AM by Dr. Kurt Junior M.D.       She feels good. She walks 3.5 mi/day. NO abd or chest pain. No nausea or vomiting. NO fevers, chills. Weight stable. She is vegetarian. NO diarrhea or constipation. NO rectal bleeding or melena.     Past Medical History:   Diagnosis Date   • Bipolar affect, depressed (HCC)    • Cervical myelopathy (HCC)    • Colon polyp    • Depression    • Gastritis    • GERD (gastroesophageal reflux disease)    • H/O colonoscopy with polypectomy    • Migraine headache    • Pancreatic cyst     4 cm       Past Surgical History:   Procedure Laterality Date   • COLONOSCOPY  05/2013   • COLONOSCOPY N/A 10/31/2016    polyps, tics, IH, tubular adenoma w/low grade dysplasia x 2, hyperplastic polyp x3   • COLONOSCOPY N/A 12/9/2019    Procedure: COLONOSCOPY  WITH COLD BIOPSIES AND COLD SNARE POLYPECTOMY;  Surgeon: Thad Brumfield MD;  Location: [facility] ENDOSCOPY;  Service: Gastroenterology   • ENDOSCOPY  11/2013   • ENDOSCOPY N/A 10/31/2016    erythematous mucosa in stomach   • ENDOSCOPY N/A 12/9/2019    Procedure: ESOPHAGOGASTRODUODENOSCOPYEITH COLD BIOPSIES;  Surgeon: Thad Brumfield MD;  Location: [facility] ENDOSCOPY;  Service: Gastroenterology   • TONSILLECTOMY     • UPPER  GASTROINTESTINAL ENDOSCOPY           Current Outpatient Medications:   •  alendronate (FOSAMAX) 70 MG tablet, TAKE 1 TABLET BY MOUTH EVERY 7 (SEVEN) DAYS., Disp: 12 tablet, Rfl: 4  •  atorvastatin (LIPITOR) 20 MG tablet, Take 1 tablet by mouth Every Night., Disp: 90 tablet, Rfl: 4  •  buPROPion SR (WELLBUTRIN SR) 150 MG 12 hr tablet, Take 150 mg by mouth 2 (Two) Times a Day., Disp: , Rfl:   •  lamoTRIgine (LaMICtal) 100 MG tablet, Take 150 mg by mouth 3 (Three) Times a Day., Disp: , Rfl:   •  LORazepam (ATIVAN) 0.5 MG tablet, Take 0.5 mg by mouth 2 (Two) Times a Day As Needed., Disp: , Rfl: 2  •  metroNIDAZOLE (METROCREAM) 0.75 % cream, APPLY TO SKIN TWICE DAILY FOR 6 WEEKS AS DIRECTED, Disp: , Rfl:   •  omeprazole (priLOSEC) 40 MG capsule, Take 1 capsule by mouth Daily., Disp: 90 capsule, Rfl: 3  •  prochlorperazine (COMPAZINE) 5 MG tablet, TAKE 1 TABLET BY MOUTH EVERY 6 HOURS AS NEEDED FOR NAUSEA OR VOMITING., Disp: 60 tablet, Rfl: 2  •  vitamin D (ERGOCALCIFEROL) 1.25 MG (75859 UT) capsule capsule, Take 1 capsule by mouth 1 (One) Time Per Week., Disp: 12 capsule, Rfl: 4  •  Vortioxetine HBr (TRINTELLIX PO), Take 1 tablet by mouth Daily. 15 mg per day, Disp: , Rfl:     Allergies   Allergen Reactions   • Lexapro [Escitalopram Oxalate] GI Intolerance     nausea   • Prozac [Fluoxetine Hcl] Nausea Only       Family History   Problem Relation Age of Onset   • Stroke Mother    • Cancer Father    • Cancer Maternal Grandmother    • Stroke Maternal Grandmother        Social History     Socioeconomic History   • Marital status: Single   • Number of children: 0   Tobacco Use   • Smoking status: Former     Packs/day: 0.25     Years: 12.00     Pack years: 3.00     Types: Cigarettes     Start date: 2012     Quit date: 12/2019     Years since quitting: 3.2   • Smokeless tobacco: Never   • Tobacco comments:     We discussed nicotine patch, gum, hypnosis.   Vaping Use   • Vaping Use: Never used   Substance and Sexual Activity   •  Alcohol use: Yes     Comment: social   • Drug use: Never   • Sexual activity: Defer       Review of Systems   Gastrointestinal: Negative for abdominal pain.   All other systems reviewed and are negative.    Pertinent positives and negatives documented in the HPI and all other systems reviewed and were found to be negative.  Vitals:    02/22/23 1539   BP: 148/84   Pulse: 69   Temp: 97.7 °F (36.5 °C)   SpO2: 97%       Physical Exam  Vitals reviewed.   Constitutional:       General: She is not in acute distress.     Appearance: Normal appearance. She is well-developed. She is not diaphoretic.   HENT:      Head: Normocephalic and atraumatic. Hair is normal.      Right Ear: Hearing, tympanic membrane, ear canal and external ear normal. No decreased hearing noted. No drainage.      Left Ear: Hearing, tympanic membrane, ear canal and external ear normal. No decreased hearing noted.      Nose: Nose normal. No nasal deformity.      Mouth/Throat:      Mouth: Mucous membranes are moist.   Eyes:      General: Lids are normal.         Right eye: No discharge.         Left eye: No discharge.      Extraocular Movements: Extraocular movements intact.      Conjunctiva/sclera: Conjunctivae normal.      Pupils: Pupils are equal, round, and reactive to light.   Neck:      Thyroid: No thyromegaly.      Vascular: No JVD.      Trachea: No tracheal deviation.   Cardiovascular:      Rate and Rhythm: Normal rate and regular rhythm.      Pulses: Normal pulses.      Heart sounds: Normal heart sounds. No murmur heard.    No friction rub. No gallop.   Pulmonary:      Effort: Pulmonary effort is normal. No respiratory distress.      Breath sounds: Normal breath sounds. No wheezing or rales.   Chest:      Chest wall: No tenderness.   Abdominal:      General: Bowel sounds are normal. There is no distension.      Palpations: Abdomen is soft. There is no mass.      Tenderness: There is no abdominal tenderness. There is no guarding or rebound.       Hernia: No hernia is present.   Musculoskeletal:         General: No tenderness or deformity. Normal range of motion.      Cervical back: Normal range of motion and neck supple.   Lymphadenopathy:      Cervical: No cervical adenopathy.   Skin:     General: Skin is warm and dry.      Findings: No erythema or rash.   Neurological:      Mental Status: She is alert and oriented to person, place, and time.      Cranial Nerves: No cranial nerve deficit.      Motor: No abnormal muscle tone.      Coordination: Coordination normal.      Deep Tendon Reflexes: Reflexes are normal and symmetric. Reflexes normal.   Psychiatric:         Mood and Affect: Mood normal.         Behavior: Behavior normal.         Thought Content: Thought content normal.         Judgment: Judgment normal.         Diagnoses and all orders for this visit:    1. Pancreas cyst (Primary)  -     MRI abdomen w wo contrast mrcp; Future    2. Polyp of colon, unspecified part of colon, unspecified type      Assessment:  1. GERD  2. H/o colon polyps. Her last colonoscopy was 12/19. I said repeat colonoscopy in 5 yrs.   3. H/o pancreatic cystic mass.   4.           Recommendations:  1. Let's wean the omeprazole  2. Repeat MRCP in 8/23  3. F/u 1 yr.    Return in about 1 year (around 2/22/2024).    Thad Brumfield MD  2/22/2023

## 2023-05-06 DIAGNOSIS — R11.0 NAUSEA: ICD-10-CM

## 2023-05-14 RX ORDER — OMEPRAZOLE 40 MG/1
CAPSULE, DELAYED RELEASE ORAL
Qty: 90 CAPSULE | Refills: 3 | Status: SHIPPED | OUTPATIENT
Start: 2023-05-14

## 2023-05-15 ENCOUNTER — OFFICE VISIT (OUTPATIENT)
Dept: INTERNAL MEDICINE | Facility: CLINIC | Age: 78
End: 2023-05-15
Payer: MEDICARE

## 2023-05-15 VITALS
WEIGHT: 117 LBS | HEIGHT: 65 IN | OXYGEN SATURATION: 99 % | SYSTOLIC BLOOD PRESSURE: 124 MMHG | BODY MASS INDEX: 19.49 KG/M2 | DIASTOLIC BLOOD PRESSURE: 64 MMHG | HEART RATE: 74 BPM

## 2023-05-15 DIAGNOSIS — F31.9 BIPOLAR AFFECTIVE DISORDER, REMISSION STATUS UNSPECIFIED: Primary | Chronic | ICD-10-CM

## 2023-05-15 DIAGNOSIS — R53.83 OTHER FATIGUE: ICD-10-CM

## 2023-05-15 PROCEDURE — 99213 OFFICE O/P EST LOW 20 MIN: CPT

## 2023-05-15 PROCEDURE — 1159F MED LIST DOCD IN RCRD: CPT

## 2023-05-15 PROCEDURE — 1160F RVW MEDS BY RX/DR IN RCRD: CPT

## 2023-05-15 NOTE — PROGRESS NOTES
"Chief Complaint  Fatigue (exhaustion)    Subjective        Lona Thomson presents to [facility]  History of Present Illness  77-year-old female presenting with fatigue.  Patient of Dr. Herrmann.  For the past 3 months has not been feeling well, \"like I have the flu\".  Had COVID in late October 2022.  Typically wakes up at 5 AM, feeds her dog, goes for a walk with the dog and then returns.  Now she feels exhausted when she returns.  Typically walks 3.5 miles a day with her dog.  Previously at a younger age was a runner.  No changes in diet.  Has been taking Lamictal and Trintellix for bipolar.  Previously taking Wellbutrin but switched to Trintellix in 2020.  Is scheduled to discuss medications with her [facility] provider regarding possible side effects that might be causing her to feel fatigued.  Denies fever, chest pain, headaches, difficulty breathing, shortness of breath, abdominal pain, dysuria, sleeping issues.        Objective   Vital Signs:  /64   Pulse 74   Ht 165.1 cm (65\")   Wt 53.1 kg (117 lb)   SpO2 99%   BMI 19.47 kg/m²   Estimated body mass index is 19.47 kg/m² as calculated from the following:    Height as of this encounter: 165.1 cm (65\").    Weight as of this encounter: 53.1 kg (117 lb).       BMI is within normal parameters. No other follow-up for BMI required.      Physical Exam  Vitals reviewed.   Constitutional:       Appearance: Normal appearance.   HENT:      Head: Normocephalic.   Cardiovascular:      Rate and Rhythm: Normal rate and regular rhythm.      Pulses: Normal pulses.      Heart sounds: Normal heart sounds.   Pulmonary:      Effort: Pulmonary effort is normal.      Breath sounds: Normal breath sounds.   Musculoskeletal:         General: Normal range of motion.      Cervical back: Normal range of motion.   Skin:     General: Skin is warm and dry.      Capillary Refill: Capillary refill takes less than 2 seconds.   Neurological:      General: No " focal deficit present.      Mental Status: She is alert and oriented to person, place, and time.   Psychiatric:         Mood and Affect: Mood normal.         Behavior: Behavior normal.         Thought Content: Thought content normal.         Judgment: Judgment normal.        Result Review :    Common labs        8/25/2022    11:48 1/5/2023    15:59   Common Labs   Glucose  94     BUN  12     Creatinine 0.80   0.81     Sodium  142     Potassium  4.4     Chloride  102     Calcium  10.3     Total Protein  7.1     Albumin  4.7     Total Bilirubin  0.3     Alkaline Phosphatase  145     AST (SGOT)  19     ALT (SGPT)  16     WBC  8.81     Hemoglobin  14.9     Hematocrit  43.3     Platelets  359     Total Cholesterol  185     Triglycerides  84     HDL Cholesterol  66     LDL Cholesterol   104         Current Outpatient Medications on File Prior to Visit   Medication Sig Dispense Refill   • alendronate (FOSAMAX) 70 MG tablet TAKE 1 TABLET BY MOUTH EVERY 7 (SEVEN) DAYS. 12 tablet 4   • atorvastatin (LIPITOR) 20 MG tablet Take 1 tablet by mouth Every Night. 90 tablet 4   • buPROPion SR (WELLBUTRIN SR) 150 MG 12 hr tablet Take 1 tablet by mouth 2 (Two) Times a Day.     • lamoTRIgine (LaMICtal) 100 MG tablet Take 1.5 tablets by mouth 3 (Three) Times a Day.     • LORazepam (ATIVAN) 0.5 MG tablet Take 1 tablet by mouth 2 (Two) Times a Day As Needed.  2   • metroNIDAZOLE (METROCREAM) 0.75 % cream APPLY TO SKIN TWICE DAILY FOR 6 WEEKS AS DIRECTED     • prochlorperazine (COMPAZINE) 5 MG tablet TAKE 1 TABLET BY MOUTH EVERY 6 HOURS AS NEEDED FOR NAUSEA OR VOMITING. 60 tablet 2   • vitamin D (ERGOCALCIFEROL) 1.25 MG (32370 UT) capsule capsule Take 1 capsule by mouth 1 (One) Time Per Week. 12 capsule 4   • Vortioxetine HBr (TRINTELLIX PO) Take 1 tablet by mouth Daily. 15 mg per day     • omeprazole (priLOSEC) 40 MG capsule TAKE 1 CAPSULE BY MOUTH EVERY DAY (Patient not taking: Reported on 5/15/2023) 90 capsule 3     No current  facility-administered medications on file prior to visit.                Assessment and Plan   Diagnoses and all orders for this visit:    1. Bipolar affective disorder, remission status unspecified (Primary)    2. Other fatigue      Patient Instructions   Continue medications as prescribed. Discuss medications with [facility] regarding side effects. Stay active. Stay hydrated with water. Follow-up in July with Dr. Herrmann.            Follow Up   Return for Next scheduled follow up.  Patient was given instructions and counseling regarding her condition or for health maintenance advice. Please see specific information pulled into the AVS if appropriate.

## 2023-08-09 ENCOUNTER — TELEPHONE (OUTPATIENT)
Dept: INTERNAL MEDICINE | Facility: CLINIC | Age: 78
End: 2023-08-09

## 2023-08-09 NOTE — TELEPHONE ENCOUNTER
Caller: Lona Thomson    Relationship: Self    Best call back number: [phone]     What is the best time to reach you: ANY    Who are you requesting to speak with (clinical staff, provider,  specific staff member): CLINICAL STAFF    What was the call regarding: PATIENT IS REQUESTING A CALL BACK TO BE ADVISED ON WHETHER SHE NEEDS A COVID BOOSTER & IF SO, WHICH ONE WOULD SHE NEED TO GET?    Is it okay if the provider responds through MyChart: NO

## 2023-08-16 ENCOUNTER — OFFICE VISIT (OUTPATIENT)
Dept: INTERNAL MEDICINE | Facility: CLINIC | Age: 78
End: 2023-08-16
Payer: MEDICARE

## 2023-08-16 VITALS
DIASTOLIC BLOOD PRESSURE: 68 MMHG | BODY MASS INDEX: 19.73 KG/M2 | HEART RATE: 69 BPM | OXYGEN SATURATION: 98 % | WEIGHT: 118.4 LBS | SYSTOLIC BLOOD PRESSURE: 120 MMHG | HEIGHT: 65 IN

## 2023-08-16 DIAGNOSIS — H65.02 NON-RECURRENT ACUTE SEROUS OTITIS MEDIA OF LEFT EAR: Primary | ICD-10-CM

## 2023-08-16 PROCEDURE — 1160F RVW MEDS BY RX/DR IN RCRD: CPT | Performed by: NURSE PRACTITIONER

## 2023-08-16 PROCEDURE — 99213 OFFICE O/P EST LOW 20 MIN: CPT | Performed by: NURSE PRACTITIONER

## 2023-08-16 PROCEDURE — 1159F MED LIST DOCD IN RCRD: CPT | Performed by: NURSE PRACTITIONER

## 2023-08-16 RX ORDER — AMOXICILLIN 500 MG/1
1000 CAPSULE ORAL 2 TIMES DAILY
Qty: 28 CAPSULE | Refills: 0 | Status: SHIPPED | OUTPATIENT
Start: 2023-08-16 | End: 2023-08-23

## 2023-08-16 NOTE — PROGRESS NOTES
"        Chief Complaint  Earache (Left ear pain for 5-6 days )     Subjective:      History of Present Illness {CC  Problem List  Visit  Diagnosis   Encounters  Notes  Medications  Labs  Result Review Imaging  Media :23}     Lona Thomson is a patient of Ludwin Herrmann MD and presents to [facility] for  left ear pain    [place] noticed left ear 'crackling' sound when eating.     Earache   There is pain in the left ear. This is a new problem. The current episode started in the past 7 days. The problem occurs constantly. The problem has been waxing and waning. There has been no fever. The pain is mild. Pertinent negatives include no ear discharge, headaches, hearing loss or sore throat. She has tried nothing for the symptoms.      Wellbutrin: per patient, no longer taking.    States she stopped 3 weeks ago per psych at [facility].   Removed from list.     I have reviewed patient's medical history, any new submitted information provided by patient or medical assistant and updated medical record.      Objective:      Physical Exam  HENT:      Right Ear: Tympanic membrane normal.      Left Ear: A middle ear effusion is present. Tympanic membrane is erythematous.      Mouth/Throat:      Mouth: Mucous membranes are moist.      Pharynx: No oropharyngeal exudate or posterior oropharyngeal erythema.   Pulmonary:      Breath sounds: Normal breath sounds.   Lymphadenopathy:      Cervical: No cervical adenopathy.      Result Review  Data Reviewed:{ Labs  Result Review  Imaging  Med Tab  Media :23}                Vital Signs:   /68 (BP Location: Left arm, Patient Position: Sitting, Cuff Size: Adult)   Pulse 69   Ht 165.1 cm (65\")   Wt 53.7 kg (118 lb 6.4 oz)   SpO2 98%   BMI 19.70 kg/mý         Requested Prescriptions     Signed Prescriptions Disp Refills    amoxicillin (AMOXIL) 500 MG capsule 28 capsule 0     Sig: Take 2 capsules " by mouth 2 (Two) Times a Day for 7 days.       Routine medications provided by this office will also be refilled via pharmacy request.       Current Outpatient Medications:     alendronate (FOSAMAX) 70 MG tablet, TAKE 1 TABLET BY MOUTH EVERY 7 (SEVEN) DAYS., Disp: 12 tablet, Rfl: 4    atorvastatin (LIPITOR) 20 MG tablet, Take 1 tablet by mouth Every Night., Disp: 90 tablet, Rfl: 4    lamoTRIgine (LaMICtal) 100 MG tablet, Take 1.5 tablets by mouth 3 (Three) Times a Day., Disp: , Rfl:     LORazepam (ATIVAN) 0.5 MG tablet, Take 1 tablet by mouth 2 (Two) Times a Day As Needed., Disp: , Rfl: 2    metroNIDAZOLE (METROCREAM) 0.75 % cream, APPLY TO SKIN TWICE DAILY FOR 6 WEEKS AS DIRECTED, Disp: , Rfl:     omeprazole (priLOSEC) 40 MG capsule, TAKE 1 CAPSULE BY MOUTH EVERY DAY, Disp: 90 capsule, Rfl: 3    prochlorperazine (COMPAZINE) 5 MG tablet, TAKE 1 TABLET BY MOUTH EVERY 6 HOURS AS NEEDED FOR NAUSEA OR VOMITING., Disp: 60 tablet, Rfl: 2    vitamin D (ERGOCALCIFEROL) 1.25 MG (13828 UT) capsule capsule, Take 1 capsule by mouth 1 (One) Time Per Week., Disp: 12 capsule, Rfl: 4    Vortioxetine HBr (TRINTELLIX PO), Take 1 tablet by mouth Daily. 15 mg per day, Disp: , Rfl:     amoxicillin (AMOXIL) 500 MG capsule, Take 2 capsules by mouth 2 (Two) Times a Day for 7 days., Disp: 28 capsule, Rfl: 0     Assessment and Plan:      Assessment and Plan {CC Problem List  Visit Diagnosis  ROS  Review (Popup)  Health Maintenance  Quality  BestPractice  Medications  SmartSets  SnapShot Encounters  Media :23}     Problem List Items Addressed This Visit    None  Visit Diagnoses       Non-recurrent acute serous otitis media of left ear    -  Primary    Relevant Medications    amoxicillin (AMOXIL) 500 MG capsule            Follow Up {Instructions Charge Capture  Follow-up Communications :23}     Return if symptoms worsen or fail to improve.    Follow up with PCP as scheduled.     Patient was given instructions and counseling  regarding her condition or for health maintenance advice. Please see specific information pulled into the AVS if appropriate.    Beverly disclaimer:   Much of this encounter note is an electronic transcription/translation of spoken language to printed text. The electronic translation of spoken language may permit erroneous, or at times, nonsensical words or phrases to be inadvertently transcribed; Although I have reviewed the note for such errors, some may still exist.     Additional Patient Counseling:       There are no Patient Instructions on file for this visit.

## 2023-08-25 ENCOUNTER — HOSPITAL ENCOUNTER (OUTPATIENT)
Dept: MRI IMAGING | Facility: HOSPITAL | Age: 78
Discharge: HOME | End: 2023-08-25
Admitting: INTERNAL MEDICINE
Payer: MEDICARE

## 2023-08-25 DIAGNOSIS — K86.2 PANCREAS CYST: ICD-10-CM

## 2023-08-25 PROCEDURE — A9577 INJ MULTIHANCE: HCPCS | Performed by: INTERNAL MEDICINE

## 2023-08-25 PROCEDURE — 74183 MRI ABD W/O CNTR FLWD CNTR: CPT

## 2023-08-25 PROCEDURE — 82565 ASSAY OF CREATININE: CPT

## 2023-08-25 PROCEDURE — 0 GADOBENATE DIMEGLUMINE 529 MG/ML SOLUTION: Performed by: INTERNAL MEDICINE

## 2023-08-25 RX ADMIN — GADOBENATE DIMEGLUMINE 10 ML: 529 INJECTION, SOLUTION INTRAVENOUS at 10:28

## 2023-08-26 LAB — CREAT BLDA-MCNC: 0.8 MG/DL (ref 0.6–1.3)

## 2023-09-11 ENCOUNTER — TELEPHONE (OUTPATIENT)
Dept: GASTROENTEROLOGY | Facility: CLINIC | Age: 78
End: 2023-09-11
Payer: MEDICARE

## 2023-09-11 NOTE — PROGRESS NOTES
09/10/23       Tell her that her MRI of the abdomen shows a tiny pancreatic uncinate cyst that is stable. The Radiologist is thinking that this is probably a side branch IPMN or an old pancreatic pseudocyst. The Radiologist is recommending that we repeat an MRI of the abdomen in 2 yrs. We can discuss in more detail when I see her next in the office.        Send a copy of this report to her PCP.   Jorge stein

## 2023-09-11 NOTE — TELEPHONE ENCOUNTER
----- Message from Thad Brumfield MD sent at 9/10/2023  8:34 PM EDT -----  09/10/23       Tell her that her MRI of the abdomen shows a tiny pancreatic uncinate cyst that is stable. The Radiologist is thinking that this is probably a side branch IPMN or an old pancreatic pseudocyst. The Radiologist is recommending that we repeat an MRI of the abdomen in 2 yrs. We can discuss in more detail when I see her next in the office.        Send a copy of this report to her PCP.   Jorge stein

## 2023-10-02 ENCOUNTER — OFFICE VISIT (OUTPATIENT)
Dept: INTERNAL MEDICINE | Facility: CLINIC | Age: 78
End: 2023-10-02
Payer: MEDICARE

## 2023-10-02 VITALS
HEIGHT: 65 IN | OXYGEN SATURATION: 98 % | DIASTOLIC BLOOD PRESSURE: 88 MMHG | BODY MASS INDEX: 19.49 KG/M2 | WEIGHT: 117 LBS | SYSTOLIC BLOOD PRESSURE: 120 MMHG | HEART RATE: 70 BPM

## 2023-10-02 DIAGNOSIS — M85.89 OSTEOPENIA OF MULTIPLE SITES: Chronic | ICD-10-CM

## 2023-10-02 DIAGNOSIS — M81.6 LOCALIZED OSTEOPOROSIS WITHOUT CURRENT PATHOLOGICAL FRACTURE: Chronic | ICD-10-CM

## 2023-10-02 DIAGNOSIS — E78.01 FAMILIAL HYPERCHOLESTEREMIA: Primary | Chronic | ICD-10-CM

## 2023-10-02 DIAGNOSIS — E55.9 VITAMIN D DEFICIENCY: Chronic | ICD-10-CM

## 2023-10-02 PROCEDURE — 1159F MED LIST DOCD IN RCRD: CPT | Performed by: FAMILY MEDICINE

## 2023-10-02 PROCEDURE — 99214 OFFICE O/P EST MOD 30 MIN: CPT | Performed by: FAMILY MEDICINE

## 2023-10-02 PROCEDURE — 1160F RVW MEDS BY RX/DR IN RCRD: CPT | Performed by: FAMILY MEDICINE

## 2023-10-02 RX ORDER — ESCITALOPRAM OXALATE 10 MG/1
TABLET ORAL
COMMUNITY
Start: 2023-09-18

## 2023-10-02 RX ORDER — PHENOL 1.4 %
600 AEROSOL, SPRAY (ML) MUCOUS MEMBRANE DAILY
COMMUNITY

## 2023-10-02 NOTE — PROGRESS NOTES
Chief Complaint  Follow-up (Osteoporosis, hyperlipidemia)    Subjective        Lona Thomson presents to [facility]  History of Present Illness    She is following up today for hyperlipidemia.  We have been monitoring her cholesterol closely as last year she had an LDL of 210 with a total cholesterol 303.  She was started on a statin medication and back in January of this year and she was improving with an LDL of 104.  Due for recheck of lab.     She is also following up for osteoporosis as well as osteopenia of multiple sites.  She takes alendronate and vitamin D.  She is due for next DEXA scan.  No problems with the medication that she is aware of.  She does have vitamin D deficiency as well.      Current Outpatient Medications:     alendronate (FOSAMAX) 70 MG tablet, TAKE 1 TABLET BY MOUTH EVERY 7 (SEVEN) DAYS., Disp: 12 tablet, Rfl: 4    atorvastatin (LIPITOR) 20 MG tablet, Take 1 tablet by mouth Every Night., Disp: 90 tablet, Rfl: 4    escitalopram (LEXAPRO) 10 MG tablet, Patient supposed to start after finishing Trintellix, Disp: , Rfl:     lamoTRIgine (LaMICtal) 100 MG tablet, Take 1.5 tablets by mouth 3 (Three) Times a Day., Disp: , Rfl:     LORazepam (ATIVAN) 0.5 MG tablet, Take 1 tablet by mouth 2 (Two) Times a Day As Needed., Disp: , Rfl: 2    metroNIDAZOLE (METROCREAM) 0.75 % cream, APPLY TO SKIN TWICE DAILY FOR 6 WEEKS AS DIRECTED, Disp: , Rfl:     omeprazole (priLOSEC) 40 MG capsule, TAKE 1 CAPSULE BY MOUTH EVERY DAY, Disp: 90 capsule, Rfl: 3    prochlorperazine (COMPAZINE) 5 MG tablet, TAKE 1 TABLET BY MOUTH EVERY 6 HOURS AS NEEDED FOR NAUSEA OR VOMITING., Disp: 60 tablet, Rfl: 2    vitamin D (ERGOCALCIFEROL) 1.25 MG (30317 UT) capsule capsule, Take 1 capsule by mouth 1 (One) Time Per Week., Disp: 12 capsule, Rfl: 4    Vortioxetine HBr (TRINTELLIX PO), Take 1 tablet by mouth Daily. 15 mg per day, Disp: , Rfl:     calcium carbonate (OS-SEVERINO) 600 MG tablet, Take 1  "tablet by mouth Daily., Disp: , Rfl:         Objective   Vital Signs:  /88 (BP Location: Left arm, Patient Position: Sitting, Cuff Size: Adult)   Pulse 70   Ht 165.1 cm (65\")   Wt 53.1 kg (117 lb)   SpO2 98%   BMI 19.47 kg/m²   Estimated body mass index is 19.47 kg/m² as calculated from the following:    Height as of this encounter: 165.1 cm (65\").    Weight as of this encounter: 53.1 kg (117 lb).       BMI is within normal parameters. No other follow-up for BMI required.      Physical Exam  Vitals and nursing note reviewed.   Constitutional:       General: She is not in acute distress.     Appearance: Normal appearance.   Cardiovascular:      Rate and Rhythm: Normal rate and regular rhythm.      Heart sounds: Normal heart sounds. No murmur heard.  Pulmonary:      Effort: Pulmonary effort is normal.      Breath sounds: Normal breath sounds.   Neurological:      Mental Status: She is alert.      Result Review :  The following data was reviewed by: Ludwin Herrmann MD on 10/02/2023:  Common labs          1/5/2023    15:59 7/20/2023    14:55 8/25/2023    09:37   Common Labs   Glucose 94  82     BUN 12  15     Creatinine 0.81  0.92  0.80    Sodium 142  142     Potassium 4.4  4.2     Chloride 102  102     Calcium 10.3  10.6     Total Protein 7.1  6.9     Albumin 4.7  4.9     Total Bilirubin 0.3  0.3     Alkaline Phosphatase 145  123     AST (SGOT) 19  18     ALT (SGPT) 16  22     WBC 8.81      Hemoglobin 14.9      Hematocrit 43.3      Platelets 359      Total Cholesterol 185      Triglycerides 84      HDL Cholesterol 66      LDL Cholesterol  104                     Assessment and Plan   Diagnoses and all orders for this visit:    1. Familial hypercholesteremia (Primary)  -     Comprehensive Metabolic Panel; Future  -     Lipid Panel With LDL / HDL Ratio; Future    2. Localized osteoporosis without current pathological fracture  -     Comprehensive Metabolic Panel; Future  -     Vitamin D,25-Hydroxy; Future  -  "    DEXA Bone Density Axial; Future    3. Osteopenia of multiple sites  -     Comprehensive Metabolic Panel; Future  -     Vitamin D,25-Hydroxy; Future  -     DEXA Bone Density Axial; Future    4. Vitamin D deficiency  -     Vitamin D,25-Hydroxy; Future        Clinically stable chronic conditions as above.  Continue all medications as above.  Labs reviewed/ordered as above.           Follow Up   Return in about 6 months (around 4/2/2024) for Medicare Wellness.  Patient was given instructions and counseling regarding her condition or for health maintenance advice. Please see specific information pulled into the AVS if appropriate.

## 2023-10-02 NOTE — PATIENT INSTRUCTIONS
"MyChart Tips:    MyChart is a useful part of our patient care program and is a great way for us to communicate lab results and for you to request refills.  Not all medical questions are appropriate for MyChart such as new medical issues that require taking a history, performing an exam, getting labs/studies or researching medical questions needed to be addressed in the office.    Examples of medical issues that are APPROPRIATE for MyChart:  -Follow-up on problems we have already addressed in a visit such as home testing results, blood pressure readings, glucose readings  -Questions that can be answered with a simple \"yes\" or \"no\"    Communication that is NOT APPROPRIATE for MyChart:  -MyChart is not for new problems, serious problems or urgent problems.  Urgent matters should be addressed by phone, in the office, urgent care or the ER.  -[employer] messages are not email.  Staff will check messages each weekday.  We strive for a 48-hour turnaround on messaging.    -[employer] is not for private issues.  Messages are received first by our office staff.    Please allow up to 7 business days for lab results to be sent through [employer] to you before contacting your provider.  Sometimes we are waiting for results to get back from the lab and also your provider needs time to analyze them thoroughly before making recommendations.  While the internet has great resources, it is not a substitute for interpreting lab results.    We also ask that you not send [employer] messages and telephone calls regarding the same issue simultaneously.  This slows down the process of returning your call/message and confuses staff.    Be mindful that [employer] messages and telephone calls become part of your permanent medical record.    Lastly, your provider cannot access your [employer].  It is a service which communicates with the EHR (Electronic Health Record).  Sometimes there are errors in [employer] that staff and providers cannot see and these errors " are not part of your EHR.  Vaccines and screening reminders have been incorrect in MyChart.    We appreciate your respect for the limitations and boundaries of [employer].

## 2023-10-27 ENCOUNTER — TELEPHONE (OUTPATIENT)
Dept: INTERNAL MEDICINE | Facility: CLINIC | Age: 78
End: 2023-10-27

## 2023-10-27 NOTE — TELEPHONE ENCOUNTER
Caller: Lona Thomson    Relationship: Self    Best call back number: [phone]    What medication are you requesting: SOMETHING FOR AN EARACHE    What are your current symptoms: EARACHE    How long have you been experiencing symptoms: 4 WEEKS     Have you had these symptoms before:    [x] Yes  [] No    Have you been treated for these symptoms before:   [x] Yes  [] No    If a prescription is needed, what is your preferred pharmacy and phone number: [id number] [place], [place] - [address] IN THE [facility] [phone] [facility] [phone]      Additional notes: PATIENT STATED THAT THEY HAD HAD THE ISSUE BACK IN  JULY AND WAS PRESCRIBED A MEDICATION THAT HELPED BUT THAT THE EARACHE IS PROGRESSIVELY COMING BACK. PLEASE CALL AND ADVISE

## 2023-11-03 ENCOUNTER — HOSPITAL ENCOUNTER (OUTPATIENT)
Facility: HOSPITAL | Age: 78
Discharge: HOME | End: 2023-11-03
Admitting: FAMILY MEDICINE
Payer: MEDICARE

## 2023-11-03 DIAGNOSIS — M81.6 LOCALIZED OSTEOPOROSIS WITHOUT CURRENT PATHOLOGICAL FRACTURE: Chronic | ICD-10-CM

## 2023-11-03 DIAGNOSIS — M85.89 OSTEOPENIA OF MULTIPLE SITES: Chronic | ICD-10-CM

## 2023-11-03 PROCEDURE — 77080 DXA BONE DENSITY AXIAL: CPT

## 2023-11-07 ENCOUNTER — TELEPHONE (OUTPATIENT)
Dept: INTERNAL MEDICINE | Facility: CLINIC | Age: 78
End: 2023-11-07

## 2023-11-07 NOTE — TELEPHONE ENCOUNTER
Sending as a FYI     Medlist for CA+ and Vit D is correct  See below for more info    Let patient know we will contact once you review with advisement for her

## 2023-11-07 NOTE — TELEPHONE ENCOUNTER
Yes, I have not had a chance to review the bone density results and will send results and recs soon.

## 2023-11-07 NOTE — TELEPHONE ENCOUNTER
Caller: Lona Thomson    Relationship: Self    Best call back number: [phone]     Caller requesting test results: BONE DENSITY TEST        When was the test performed: 11/3/2023      Additional notes:     PATIENT SAW THE TEST RESULTS THAT WHERE POSTED TO HER MYCHART TODAY.  SHE HAS SOME QUESTIONS ABOUT HER MEDICATIONS THAT SHE IS CURRENTLY TAKING WITH THE NEW FOUND DIAGNOSES.  SHE IS WANTING TO KNOW SHOULD SHE BE TAKEN SOMETHING DIFFERENT FOR HER RIGHT HIP THAT HURTS.  SHE ALSO STATED THAT SHE WALKS THREE MILES A DAY.    PATIENT WOULD LIKE A CALL BACK TO DISCUSS THIS MORE IN DEPTH PLEASE.

## 2023-11-08 NOTE — TELEPHONE ENCOUNTER
Caller: Lona Thomson     Relationship: SELF    Best call back number: [phone]     What is your medical concern?       PATIENT FORGOT TO INCLUDE IN HER MESSAGE YESTERDAY THAT SHE CAN NOT WALK UP STEPS ONE FOOT AFTER THE OTHER THAT SHE HAS TO TAKE THE STEPS ONE AT A TIME DUE TO THE DISCOMFORT WHEN SHE IS PUTTING WEIGHT ON HER RIGHT HIP.    SHE JUST WANTED YOU TO BE AWARE OF THIS INFORMATION PRIOR TO SPEAKING WITH HER.

## 2023-11-08 NOTE — TELEPHONE ENCOUNTER
I sent her the results in her mychart regarding the bone density.  Now hip pain can be a whole other problem regardless of bone density.  I would recommend a visit to follow-up and be examined for the worsening hip pain.

## 2023-11-10 ENCOUNTER — TELEPHONE (OUTPATIENT)
Dept: GASTROENTEROLOGY | Facility: CLINIC | Age: 78
End: 2023-11-10

## 2023-11-10 NOTE — TELEPHONE ENCOUNTER
Caller: Lona Thomson    Relationship: Self    Best call back number: [phone]    What is the best time to reach you: AFTER 1:30PM     Who are you requesting to speak with (clinical staff, provider,  specific staff member): NON-CLINICAL    Do you know the name of the person who called: KAREEM POTTER     What was the call regarding: RESCHEDULING FOLLOW UP APPT. ON 02/21/24    Is it okay if the provider responds through MyChart: IT WOULD BE BETTER TO CALL.

## 2023-11-28 ENCOUNTER — TELEPHONE (OUTPATIENT)
Dept: INTERNAL MEDICINE | Facility: CLINIC | Age: 78
End: 2023-11-28

## 2023-11-28 DIAGNOSIS — Z12.31 ENCOUNTER FOR SCREENING MAMMOGRAM FOR MALIGNANT NEOPLASM OF BREAST: Primary | ICD-10-CM

## 2023-11-28 NOTE — TELEPHONE ENCOUNTER
Caller: Lona Thomson    Relationship: Self    Best call back number: [phone]    What orders are you requesting (i.e. lab or imaging): MAMMOGRAM     In what timeframe would the patient need to come in: AS SOON AS POSSIBLE     Where will you receive your lab/imaging services: [facility]     Additional notes: PATIENTS GYNECOLOGIST IS RETIRING AND SHE WOULD LIKE TO GET A MAMMOGRAM DONE AS SOON AS POSSIBLE DUE TO HER BEING OVERDUE FOR HER ROUTINE CHECK.

## 2023-11-29 NOTE — TELEPHONE ENCOUNTER
Name: Lona Thomson      Relationship: Self      Best Callback Number:       HUB PROVIDED THE RELAY MESSAGE FROM THE OFFICE      PATIENT: VOICED UNDERSTANDING AND HAS NO FURTHER QUESTIONS AT THIS TIME    ADDITIONAL INFORMATION: I HAVE READ THIS HUB TO READ TO THE PATIENT AND SHE UNDERSTOOD.

## 2023-12-03 DIAGNOSIS — M85.89 OSTEOPENIA OF MULTIPLE SITES: ICD-10-CM

## 2023-12-03 DIAGNOSIS — M81.6 LOCALIZED OSTEOPOROSIS WITHOUT CURRENT PATHOLOGICAL FRACTURE: ICD-10-CM

## 2023-12-04 RX ORDER — ALENDRONATE SODIUM 70 MG/1
70 TABLET ORAL
Qty: 12 TABLET | Refills: 4 | Status: SHIPPED | OUTPATIENT
Start: 2023-12-04

## 2023-12-18 RX ORDER — ESCITALOPRAM OXALATE 10 MG/1
TABLET ORAL
Qty: 90 TABLET | OUTPATIENT
Start: 2023-12-18

## 2023-12-27 ENCOUNTER — TELEPHONE (OUTPATIENT)
Dept: INTERNAL MEDICINE | Facility: CLINIC | Age: 78
End: 2023-12-27
Payer: MEDICARE

## 2023-12-27 NOTE — TELEPHONE ENCOUNTER
Unable to contact pt to reschedule apt for 4/8 due to provider being out voicemail not set up      Hub okay to get pt rescheduled

## 2024-01-03 ENCOUNTER — HOSPITAL ENCOUNTER (OUTPATIENT)
Dept: MAMMOGRAPHY | Facility: HOSPITAL | Age: 79
Discharge: HOME | End: 2024-01-03
Admitting: FAMILY MEDICINE
Payer: MEDICARE

## 2024-01-03 DIAGNOSIS — Z12.31 ENCOUNTER FOR SCREENING MAMMOGRAM FOR MALIGNANT NEOPLASM OF BREAST: ICD-10-CM

## 2024-01-03 PROCEDURE — 77063 BREAST TOMOSYNTHESIS BI: CPT

## 2024-01-03 PROCEDURE — 77067 SCR MAMMO BI INCL CAD: CPT

## 2024-02-16 ENCOUNTER — OFFICE VISIT (OUTPATIENT)
Dept: INTERNAL MEDICINE | Facility: CLINIC | Age: 79
End: 2024-02-16
Payer: MEDICARE

## 2024-02-16 VITALS
SYSTOLIC BLOOD PRESSURE: 128 MMHG | OXYGEN SATURATION: 98 % | RESPIRATION RATE: 18 BRPM | DIASTOLIC BLOOD PRESSURE: 80 MMHG | HEIGHT: 65 IN | WEIGHT: 117 LBS | BODY MASS INDEX: 19.49 KG/M2 | HEART RATE: 68 BPM

## 2024-02-16 DIAGNOSIS — Z00.00 MEDICARE ANNUAL WELLNESS VISIT, SUBSEQUENT: Primary | ICD-10-CM

## 2024-02-16 DIAGNOSIS — E78.01 FAMILIAL HYPERCHOLESTEREMIA: Chronic | ICD-10-CM

## 2024-02-16 DIAGNOSIS — E55.9 VITAMIN D DEFICIENCY: ICD-10-CM

## 2024-02-16 DIAGNOSIS — M85.852 OSTEOPENIA OF LEFT HIP: Chronic | ICD-10-CM

## 2024-02-16 DIAGNOSIS — M81.6 LOCALIZED OSTEOPOROSIS WITHOUT CURRENT PATHOLOGICAL FRACTURE: Chronic | ICD-10-CM

## 2024-02-16 RX ORDER — ERGOCALCIFEROL 1.25 MG/1
50000 CAPSULE ORAL WEEKLY
Qty: 15 CAPSULE | Refills: 3 | Status: SHIPPED | OUTPATIENT
Start: 2024-02-16

## 2024-02-16 RX ORDER — ATORVASTATIN CALCIUM 20 MG/1
20 TABLET, FILM COATED ORAL NIGHTLY
Qty: 90 TABLET | Refills: 4 | Status: SHIPPED | OUTPATIENT
Start: 2024-02-16

## 2024-02-27 ENCOUNTER — TELEPHONE (OUTPATIENT)
Dept: INTERNAL MEDICINE | Facility: CLINIC | Age: 79
End: 2024-02-27
Payer: MEDICARE

## 2024-02-27 DIAGNOSIS — M81.6 LOCALIZED OSTEOPOROSIS WITHOUT CURRENT PATHOLOGICAL FRACTURE: Primary | ICD-10-CM

## 2024-05-10 ENCOUNTER — OFFICE VISIT (OUTPATIENT)
Dept: ENDOCRINOLOGY | Age: 79
End: 2024-05-10
Payer: MEDICARE

## 2024-05-10 VITALS
WEIGHT: 117 LBS | DIASTOLIC BLOOD PRESSURE: 78 MMHG | OXYGEN SATURATION: 99 % | HEIGHT: 65 IN | BODY MASS INDEX: 19.49 KG/M2 | HEART RATE: 61 BPM | SYSTOLIC BLOOD PRESSURE: 124 MMHG

## 2024-05-10 DIAGNOSIS — M81.0 OSTEOPOROSIS, UNSPECIFIED OSTEOPOROSIS TYPE, UNSPECIFIED PATHOLOGICAL FRACTURE PRESENCE: Primary | ICD-10-CM

## 2024-05-15 ENCOUNTER — OFFICE VISIT (OUTPATIENT)
Dept: GASTROENTEROLOGY | Facility: CLINIC | Age: 79
End: 2024-05-15
Payer: MEDICARE

## 2024-05-15 VITALS
DIASTOLIC BLOOD PRESSURE: 69 MMHG | SYSTOLIC BLOOD PRESSURE: 125 MMHG | HEIGHT: 65 IN | HEART RATE: 64 BPM | BODY MASS INDEX: 19.43 KG/M2 | WEIGHT: 116.6 LBS | TEMPERATURE: 98.2 F

## 2024-05-15 DIAGNOSIS — K87 DISORDERS OF GALLBLADDER, BILIARY TRACT AND PANCREAS IN DISEASES CLASSIFIED ELSEWHERE: ICD-10-CM

## 2024-05-15 DIAGNOSIS — K86.2 PANCREAS CYST: ICD-10-CM

## 2024-05-15 DIAGNOSIS — K21.00 GASTROESOPHAGEAL REFLUX DISEASE WITH ESOPHAGITIS WITHOUT HEMORRHAGE: ICD-10-CM

## 2024-05-15 DIAGNOSIS — D37.8 NEOPLASM OF UNCERTAIN BEHAVIOR OF OTHER SPECIFIED DIGESTIVE ORGANS: ICD-10-CM

## 2024-05-15 DIAGNOSIS — D12.6 TUBULAR ADENOMA OF COLON: Primary | ICD-10-CM

## 2024-05-15 PROCEDURE — 99214 OFFICE O/P EST MOD 30 MIN: CPT | Performed by: INTERNAL MEDICINE

## 2024-05-15 PROCEDURE — 1159F MED LIST DOCD IN RCRD: CPT | Performed by: INTERNAL MEDICINE

## 2024-05-15 PROCEDURE — 1160F RVW MEDS BY RX/DR IN RCRD: CPT | Performed by: INTERNAL MEDICINE

## 2024-05-15 NOTE — PROGRESS NOTES
Chief Complaint   Patient presents with    pancreas cyst       History of Present Illness:   78 y.o. female who has a history of a small cystic lesion in the uncinate process of the pancreas.   She was last seen by me in 2/23:  Assessment:  1. GERD  2. H/o colon polyps. Her last colonoscopy was 12/19. I said repeat colonoscopy in 5 yrs.   3. H/o pancreatic cystic mass.     Recommendations:  1. Let's wean the omeprazole  2. Repeat MRCP in 8/23  3. F/u 1 yr.       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 Consider 2-year follow-up if clinically  indicated.     This report was finalized on 8/28/2023 2:33 PM by Dr. Sammy Izaguirre M.D.        I feel good! Found to have osteoporosis. She takes the Omeprazole 40 mg/day prn. NO abd or chest pain. No nausea or vomiting. No constipatoin. No diarrhea. No rectal bleeding or melena. Weight stable.     Past Medical History:   Diagnosis Date    Bipolar affect, depressed     Cervical myelopathy     Colon polyp     Depression     Gastritis     GERD (gastroesophageal reflux disease)     H/O colonoscopy with polypectomy     Migraine headache     Pancreatic cyst     4 cm       Past Surgical History:   Procedure Laterality Date    COLONOSCOPY  05/2013    COLONOSCOPY N/A 10/31/2016    polyps, tics, IH, tubular adenoma w/low grade dysplasia x 2, hyperplastic polyp x3    COLONOSCOPY N/A 12/9/2019    Procedure: COLONOSCOPY  WITH COLD BIOPSIES AND COLD SNARE POLYPECTOMY;  Surgeon: Thad Brumfield MD;  Location: [facility] ENDOSCOPY;  Service: Gastroenterology    ENDOSCOPY  11/2013    ENDOSCOPY N/A 10/31/2016    erythematous mucosa in stomach    ENDOSCOPY N/A 12/9/2019    Procedure: ESOPHAGOGASTRODUODENOSCOPYEITH COLD BIOPSIES;  Surgeon: Thad Brumfield MD;  Location: [facility] ENDOSCOPY;  Service: Gastroenterology    TONSILLECTOMY      UPPER GASTROINTESTINAL ENDOSCOPY           Current Outpatient  Medications:     alendronate (FOSAMAX) 70 MG tablet, TAKE 1 TABLET BY MOUTH EVERY 7 DAYS., Disp: 12 tablet, Rfl: 4    atorvastatin (LIPITOR) 20 MG tablet, Take 1 tablet by mouth Every Night., Disp: 90 tablet, Rfl: 4    calcium carbonate (OS-SEVERINO) 600 MG tablet, Take 1 tablet by mouth Daily., Disp: , Rfl:     escitalopram (LEXAPRO) 10 MG tablet, Patient supposed to start after finishing Trintellix, Disp: , Rfl:     lamoTRIgine (LaMICtal) 100 MG tablet, Take 1.5 tablets by mouth 3 (Three) Times a Day., Disp: , Rfl:     LORazepam (ATIVAN) 0.5 MG tablet, Take 1 tablet by mouth 2 (Two) Times a Day As Needed., Disp: , Rfl: 2    metroNIDAZOLE (METROCREAM) 0.75 % cream, APPLY TO SKIN TWICE DAILY FOR 6 WEEKS AS DIRECTED, Disp: , Rfl:     omeprazole (priLOSEC) 40 MG capsule, TAKE 1 CAPSULE BY MOUTH EVERY DAY, Disp: 90 capsule, Rfl: 3    vitamin D (ERGOCALCIFEROL) 1.25 MG (79291 UT) capsule capsule, Take 1 capsule by mouth 1 (One) Time Per Week., Disp: 15 capsule, Rfl: 3    Allergies   Allergen Reactions    Lexapro [Escitalopram Oxalate] GI Intolerance     nausea    Prozac [Fluoxetine Hcl] Nausea Only       Family History   Problem Relation Age of Onset    Stroke Mother     Cancer Father     Cancer Maternal Grandmother     Stroke Maternal Grandmother        Social History     Socioeconomic History    Marital status: Single    Number of children: 0   Tobacco Use    Smoking status: Former     Current packs/day: 0.00     Average packs/day: 0.3 packs/day for 12.0 years (3.0 ttl pk-yrs)     Types: Cigarettes     Start date: 2012     Quit date: 2019     Years since quittin.4    Smokeless tobacco: Never    Tobacco comments:     We discussed nicotine patch, gum, hypnosis.   Vaping Use    Vaping status: Never Used   Substance and Sexual Activity    Alcohol use: Not Currently     Comment: Drink when out for dinner once a week    Drug use: Never    Sexual activity: Not Currently     Partners: Male       Review of Systems    Gastrointestinal:  Negative for abdominal pain.   All other systems reviewed and are negative.    Pertinent positives and negatives documented in the HPI and all other systems reviewed and were found to be negative.  Vitals:    05/15/24 1340   BP: 125/69   Pulse: 64   Temp: 98.2 °F (36.8 °C)       Physical Exam  Vitals reviewed.   Constitutional:       General: She is not in acute distress.     Appearance: Normal appearance. She is well-developed. She is not diaphoretic.   HENT:      Head: Normocephalic and atraumatic. Hair is normal.      Right Ear: Hearing, tympanic membrane, ear canal and external ear normal. No decreased hearing noted. No drainage.      Left Ear: Hearing, tympanic membrane, ear canal and external ear normal. No decreased hearing noted.      Nose: Nose normal. No nasal deformity.      Mouth/Throat:      Mouth: Mucous membranes are moist.   Eyes:      General: Lids are normal.         Right eye: No discharge.         Left eye: No discharge.      Extraocular Movements: Extraocular movements intact.      Conjunctiva/sclera: Conjunctivae normal.      Pupils: Pupils are equal, round, and reactive to light.   Neck:      Thyroid: No thyromegaly.      Vascular: No JVD.      Trachea: No tracheal deviation.   Cardiovascular:      Rate and Rhythm: Normal rate and regular rhythm.      Pulses: Normal pulses.      Heart sounds: Normal heart sounds. No murmur heard.     No friction rub. No gallop.   Pulmonary:      Effort: Pulmonary effort is normal. No respiratory distress.      Breath sounds: Normal breath sounds. No wheezing or rales.   Chest:      Chest wall: No tenderness.   Abdominal:      General: Bowel sounds are normal. There is no distension.      Palpations: Abdomen is soft. There is no mass.      Tenderness: There is no abdominal tenderness. There is no guarding or rebound.      Hernia: No hernia is present.   Genitourinary:     Comments: Did not  want a rectal exam.  Musculoskeletal:          "General: No tenderness or deformity. Normal range of motion.      Cervical back: Normal range of motion and neck supple.   Lymphadenopathy:      Cervical: No cervical adenopathy.   Skin:     General: Skin is warm and dry.      Findings: No erythema or rash.   Neurological:      Mental Status: She is alert and oriented to person, place, and time.      Cranial Nerves: No cranial nerve deficit.      Motor: No abnormal muscle tone.      Coordination: Coordination normal.      Deep Tendon Reflexes: Reflexes are normal and symmetric. Reflexes normal.   Psychiatric:         Mood and Affect: Mood normal.         Behavior: Behavior normal.         Thought Content: Thought content normal.         Judgment: Judgment normal.         Diagnoses and all orders for this visit:    1. Tubular adenoma of colon (Primary)  -     Gamma GT  -     Cancer Antigen 19-9  -     CBC & Differential  -     Comprehensive Metabolic Panel  -     Amylase  -     Lipase    2. Pancreas cyst  -     Gamma GT  -     Cancer Antigen 19-9  -     CBC & Differential  -     Comprehensive Metabolic Panel  -     Amylase  -     Lipase  -     MRI abdomen w wo contrast mrcp; Future    3. Gastroesophageal reflux disease with esophagitis without hemorrhage  -     Gamma GT  -     Cancer Antigen 19-9  -     CBC & Differential  -     Comprehensive Metabolic Panel  -     Amylase  -     Lipase    4. Disorders of gallbladder, biliary tract and pancreas in diseases classified elsewhere  -     Gamma GT    5. 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  GERD   H/o colon polyps. Her last colonoscopy was 12/19. I said repeat colonoscopy in 5 yrs.   H/o pancreatic cyst      Recommendations:  CMP, hep profile, GGT, CA 19-9  Colonoscopy - she will think about this.  Repeat MRCP in 8/24.      No follow-ups on file.    Thad Brumfield MD  5/15/2024  "

## 2024-05-22 ENCOUNTER — TELEPHONE (OUTPATIENT)
Dept: GASTROENTEROLOGY | Facility: CLINIC | Age: 79
End: 2024-05-22
Payer: MEDICARE

## 2024-05-22 NOTE — TELEPHONE ENCOUNTER
Hub staff attempted to follow warm transfer process and was unsuccessful     Caller: Lona Thomson    Relationship to patient: Self    Best call back number: [phone]    Patient is needing: PATIENT HAS APPT FOR LABS @ 10AM TODAY (05/22/24). SHE IS NOT FEELING WELL AND NEEDS TO RESCHEDULE THIS APPT. PLEASE REVIEW AND CONTACT PATIENT TO SCHEDULE.

## 2024-05-23 ENCOUNTER — TELEPHONE (OUTPATIENT)
Dept: GASTROENTEROLOGY | Facility: CLINIC | Age: 79
End: 2024-05-23

## 2024-05-23 NOTE — TELEPHONE ENCOUNTER
Hub staff attempted to follow warm transfer process and was unsuccessful     Caller: Lona Thomson    Relationship to patient: Self    Best call back number:  [phone]    Patient is needing: PT NEEDS TO RESCHEDULE HER LABS THAT WAS SCHEDULED FOR 05/23/2024, PLEASE CALL TO RESCHEDULE.

## 2024-06-06 ENCOUNTER — TELEPHONE (OUTPATIENT)
Dept: GASTROENTEROLOGY | Facility: CLINIC | Age: 79
End: 2024-06-06

## 2024-06-06 NOTE — TELEPHONE ENCOUNTER
Caller: Lona Thomson    Relationship: Self    Best call back number: [phone]     What form or medical record are you requesting: LETTER FROM DR REYNOLDS     Who is requesting this form or medical record from you: [facility]     Timeframe paperwork needed:  AS SOON AS POSSIBLE     Additional notes: PATIENT RECEIVED A  LETTER FROM [facility] STATING THEY NEED A LETTER FROM DR REYNOLDS THAT STATES IT IS OKAY TO HAVE LABS DONE. PLEASE CALL PATIENT.

## 2024-06-06 NOTE — TELEPHONE ENCOUNTER
Called pt she reports that [facility] told her she needs a letter to get labs done at [facility] local [facility]. Advised pt that we have not heard of this. Advised she can get her labs done at outpt lab at [facility] without appt. Pt verb understanding and states she will get them done there.    Urine calcium very slightly elevated, likely due to daily supplement.  Repeat 24 hour urine calcium to be done 10-14 days prior to follow up appointment in 10/2023. Prior to doing urine collection she will need to hold her calcium supplement for 4 days.    Also BMD 10-14 days before appointment

## 2024-06-12 ENCOUNTER — TELEPHONE (OUTPATIENT)
Dept: INTERNAL MEDICINE | Facility: CLINIC | Age: 79
End: 2024-06-12
Payer: MEDICARE

## 2024-06-12 NOTE — TELEPHONE ENCOUNTER
Caller: Lona Thomson    Relationship: Self    Best call back number: [phone]    Who are you requesting to speak with (clinical staff, provider,  specific staff member): CLINICAL/PROVIDER      What was the call regarding: PATIENT ASKS WHAT VITAMIN SUPPLEMENTS PROVIDER RECOMMENDS     Is it okay if the provider responds through MyChart: YES

## 2024-06-24 ENCOUNTER — TELEPHONE (OUTPATIENT)
Dept: INTERNAL MEDICINE | Facility: CLINIC | Age: 79
End: 2024-06-24
Payer: MEDICARE

## 2024-06-24 NOTE — TELEPHONE ENCOUNTER
Caller: Lona Thomson    Relationship: Self    Best call back number: [phone]     What is the medical concern/diagnosis: PAINFUL SWELLING IN ARCH OF LEFT FOOT    What specialty or service is being requested: PODIATRIST    Any additional details: PATIENT STATES THAT ISSUE HAS BEEN ONGOING FOR ABOUT A WEEK. SHE HAS PAIN WHEN WALKING, AND PAIN SEEMS TO BE GETTING STRONGER. REQUESTS REFERRAL TO SPECIALIST FOR FURTHER TREATMENT. PLEASE CALL TO FOLLOW UP.

## 2024-08-16 ENCOUNTER — OFFICE VISIT (OUTPATIENT)
Dept: INTERNAL MEDICINE | Facility: CLINIC | Age: 79
End: 2024-08-16
Payer: MEDICARE

## 2024-08-16 VITALS
WEIGHT: 116 LBS | OXYGEN SATURATION: 94 % | BODY MASS INDEX: 19.33 KG/M2 | RESPIRATION RATE: 18 BRPM | HEART RATE: 78 BPM | DIASTOLIC BLOOD PRESSURE: 60 MMHG | HEIGHT: 65 IN | SYSTOLIC BLOOD PRESSURE: 110 MMHG

## 2024-08-16 DIAGNOSIS — R29.898 WEAKNESS OF BOTH LOWER EXTREMITIES: Primary | ICD-10-CM

## 2024-08-16 DIAGNOSIS — R29.6 RECURRENT FALLS: ICD-10-CM

## 2024-08-16 NOTE — PROGRESS NOTES
"Chief Complaint  Follow-up and Weakness - Generalized    Subjective        Lona Thomson presents to [facility] PRIMARY [facility]ARE  History of Present Illness    She has noticed some decline in her balance and less strength getting up out of lower chairs.  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  She has a lorazepam Rx but rarely takes it.  She mentions it feels like at times she cannot get her proper placement of her feet.  She does admit to having some clutter on the floor but has been trying to pick that up out of the walkways.        Current Outpatient Medications:     alendronate (FOSAMAX) 70 MG tablet, TAKE 1 TABLET BY MOUTH EVERY 7 DAYS., Disp: 12 tablet, Rfl: 4    atorvastatin (LIPITOR) 20 MG tablet, Take 1 tablet by mouth Every Night., Disp: 90 tablet, Rfl: 4    calcium carbonate (OS-SEVERINO) 600 MG tablet, Take 1 tablet by mouth Daily., Disp: , Rfl:     escitalopram (LEXAPRO) 10 MG tablet, Patient supposed to start after finishing Trintellix, Disp: , Rfl:     lamoTRIgine (LaMICtal) 100 MG tablet, Take 1.5 tablets by mouth 3 (Three) Times a Day., Disp: , Rfl:     LORazepam (ATIVAN) 0.5 MG tablet, Take 1 tablet by mouth 2 (Two) Times a Day As Needed., Disp: , Rfl: 2    metroNIDAZOLE (METROCREAM) 0.75 % cream, APPLY TO SKIN TWICE DAILY FOR 6 WEEKS AS DIRECTED, Disp: , Rfl:     omeprazole (priLOSEC) 40 MG capsule, TAKE 1 CAPSULE BY MOUTH EVERY DAY, Disp: 90 capsule, Rfl: 3    vitamin D (ERGOCALCIFEROL) 1.25 MG (76879 UT) capsule capsule, Take 1 capsule by mouth 1 (One) Time Per Week., Disp: 15 capsule, Rfl: 3      Objective   Vital Signs:  /60 (BP Location: Left arm, Patient Position: Sitting, Cuff Size: Small Adult)   Pulse 78   Resp 18   Ht 165.1 cm (65\")   Wt 52.6 kg (116 lb)   SpO2 94%   BMI 19.30 kg/m²   Estimated body mass index is 19.3 kg/m² as calculated from the following:    Height as of this encounter: 165.1 cm (65\").    Weight as of this encounter: 52.6 kg (116 lb).    BMI is " within normal parameters. No other follow-up for BMI required.      Physical Exam  Vitals and nursing note reviewed.   Constitutional:       General: She is not in acute distress.     Appearance: Normal appearance.   Cardiovascular:      Rate and Rhythm: Normal rate and regular rhythm.      Heart sounds: Normal heart sounds. No murmur heard.  Pulmonary:      Effort: Pulmonary effort is normal.      Breath sounds: Normal breath sounds.   Neurological:      Mental Status: She is alert.      Coordination: Coordination is intact. Romberg sign negative.      Gait: Tandem walk abnormal. Gait normal.        Result Review :                  Assessment and Plan   Diagnoses and all orders for this visit:    1. Weakness of both lower extremities (Primary)  -     Ambulatory Referral to Physical Therapy for Evaluation & Treatment    2. Recurrent falls  -     Ambulatory Referral to Physical Therapy for Evaluation & Treatment      On exam it looks like she is having more musculoskeletal strength issues rather than a true balance disorder.  It looks like especially on the tandem walk she is having difficulty getting her feet completely placed for the next movement.  She is also having some weakening of the proximal lower extremity muscles.  I think some initial physical therapy would be useful.  I will give her a referral for PT.  Fall prevention information placed to AVS.         I spent 20 minutes caring for Lona on this date of service. This time includes time spent by me in the following activities:preparing for the visit, obtaining and/or reviewing a separately obtained history, performing a medically appropriate examination and/or evaluation , counseling and educating the patient/family/caregiver, referring and communicating with other health care professionals , and documenting information in the medical record  Follow Up   Return if symptoms worsen or fail to improve.  Patient was given instructions and counseling  regarding her condition or for health maintenance advice. Please see specific information pulled into the AVS if appropriate.

## 2024-09-06 DIAGNOSIS — F41.1 GENERALIZED ANXIETY DISORDER: ICD-10-CM

## 2024-09-06 DIAGNOSIS — F31.30 BIPOLAR DISORDER, CURRENT EPISODE DEPRESSED, MILD OR MODERATE SEVERITY, UNSPECIFIED: ICD-10-CM

## 2024-09-06 RX ORDER — ESCITALOPRAM OXALATE 20 MG/1
TABLET ORAL
Qty: 30 TABLET | Refills: 0 | Status: SHIPPED | OUTPATIENT
Start: 2024-09-06

## 2024-11-17 NOTE — PROGRESS NOTES
"Chief Complaint  Establish Care and Hyperlipidemia    Subjective        oLna Thomson presents to [facility] PRIMARY [facility]ARE  History of Present Illness  This is a 80 yo F with chronic medical condtions of vitamin D deficiency, GERD, bipolar disorder, osteoporosis, insomnia who presents to [facility].  Denies side effects from current regimen medication regimen is overall doing well.  She remains active with walking several miles a day and has no issues from this.    GERD: Well controlled on 40mg omeprazole  Osteoporosis: Currently on 70mg alendronate weekly.  She reports she has been doing well with this.  Vitamin D deficiency: She is on replacement.  Noted last level was slightly elevated but she had recently taken replacement when this was checked.  Hyperlipidemia: She is on 20mg atorvastatin  Bipolar disorder: Current medications include 20 mg escitalopram, 150mg lamictal 3 times per day.  She reports this regimen has worked very well for her and she does not feel that she needs any changes at this point.    Objective   Vital Signs:  /80 (BP Location: Left arm, Patient Position: Sitting, Cuff Size: Pediatric)   Pulse 61   Resp 18   Ht 165.1 cm (65\")   Wt 52 kg (114 lb 9.6 oz)   SpO2 100%   BMI 19.07 kg/m²   Estimated body mass index is 19.07 kg/m² as calculated from the following:    Height as of this encounter: 165.1 cm (65\").    Weight as of this encounter: 52 kg (114 lb 9.6 oz).    BMI is within normal parameters. No other follow-up for BMI required.      Physical Exam  Vitals and nursing note reviewed.   Constitutional:       Appearance: Normal appearance. She is normal weight.   Cardiovascular:      Rate and Rhythm: Normal rate.   Pulmonary:      Effort: No respiratory distress.   Skin:     General: Skin is warm and dry.   Neurological:      Mental Status: She is alert and oriented to person, place, and time.   Psychiatric:         Mood and Affect: Mood normal.         " Behavior: Behavior normal.         Thought Content: Thought content normal.         Judgment: Judgment normal.        Result Review :  The following data was reviewed by: Gavi Menjivar MD on 11/18/2024:  Common labs          2/21/2024    09:39   Common Labs   Glucose 86    BUN 19    Creatinine 0.77    Sodium 140    Potassium 4.4    Chloride 104    Calcium 9.9    Total Protein 6.3    Albumin 4.4    Total Bilirubin 0.4    Alkaline Phosphatase 125    AST (SGOT) 29    ALT (SGPT) 54    WBC 7.01    Hemoglobin 14.4    Hematocrit 42.1    Platelets 339    Total Cholesterol 173    Triglycerides 109    HDL Cholesterol 61    LDL Cholesterol  93      Data reviewed : Radiologic studies DEXA Nov 2023           Assessment and Plan   Diagnoses and all orders for this visit:    1. Localized osteoporosis without current pathological fracture (Primary)    2. Gastroesophageal reflux disease without esophagitis    3. Vitamin D deficiency    4. Bipolar affective disorder, remission status unspecified             Follow Up   Return in about 3 months (around 2/18/2025) for Medicare Wellness.  Patient was given instructions and counseling regarding her condition or for health maintenance advice. Please see specific information pulled into the AVS if appropriate.

## 2024-11-18 ENCOUNTER — OFFICE VISIT (OUTPATIENT)
Dept: INTERNAL MEDICINE | Facility: CLINIC | Age: 79
End: 2024-11-18
Payer: MEDICARE

## 2024-11-18 ENCOUNTER — HOSPITAL ENCOUNTER (OUTPATIENT)
Dept: PHYSICAL THERAPY | Facility: HOSPITAL | Age: 79
Discharge: HOME | End: 2024-11-18
Admitting: FAMILY MEDICINE
Payer: MEDICARE

## 2024-11-18 VITALS
HEIGHT: 65 IN | SYSTOLIC BLOOD PRESSURE: 128 MMHG | DIASTOLIC BLOOD PRESSURE: 80 MMHG | RESPIRATION RATE: 18 BRPM | OXYGEN SATURATION: 100 % | HEART RATE: 61 BPM | BODY MASS INDEX: 19.09 KG/M2 | WEIGHT: 114.6 LBS

## 2024-11-18 DIAGNOSIS — R29.898 WEAKNESS OF BOTH LOWER EXTREMITIES: Primary | ICD-10-CM

## 2024-11-18 DIAGNOSIS — K21.9 GASTROESOPHAGEAL REFLUX DISEASE WITHOUT ESOPHAGITIS: ICD-10-CM

## 2024-11-18 DIAGNOSIS — M81.6 LOCALIZED OSTEOPOROSIS WITHOUT CURRENT PATHOLOGICAL FRACTURE: Primary | Chronic | ICD-10-CM

## 2024-11-18 DIAGNOSIS — R26.89 IMPAIRMENT OF BALANCE: ICD-10-CM

## 2024-11-18 DIAGNOSIS — F31.9 BIPOLAR AFFECTIVE DISORDER, REMISSION STATUS UNSPECIFIED: Chronic | ICD-10-CM

## 2024-11-18 DIAGNOSIS — E55.9 VITAMIN D DEFICIENCY: Chronic | ICD-10-CM

## 2024-11-18 PROCEDURE — 1126F AMNT PAIN NOTED NONE PRSNT: CPT | Performed by: STUDENT IN AN ORGANIZED HEALTH CARE EDUCATION/TRAINING PROGRAM

## 2024-11-18 PROCEDURE — 99214 OFFICE O/P EST MOD 30 MIN: CPT | Performed by: STUDENT IN AN ORGANIZED HEALTH CARE EDUCATION/TRAINING PROGRAM

## 2024-11-18 PROCEDURE — 97161 PT EVAL LOW COMPLEX 20 MIN: CPT

## 2024-11-18 RX ORDER — TRIAMCINOLONE ACETONIDE 1 MG/G
OINTMENT TOPICAL
COMMUNITY
Start: 2024-08-23

## 2024-11-18 RX ORDER — DICLOFENAC SODIUM 75 MG/1
1 TABLET, DELAYED RELEASE ORAL EVERY 12 HOURS SCHEDULED
COMMUNITY
Start: 2024-10-03

## 2024-11-18 NOTE — THERAPY EVALUATION
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, unspecified    Pancreatic cyst    Polyp of colon    Constipation    Gastroesophageal reflux disease    Localized osteoporosis without current pathological fracture    Osteopenia of left hip    Postmenopause    Familial hypercholesteremia    Multiple closed fractures of ribs of right side    Closed fracture of right scapula    Fall    Vitamin D deficiency    Primary insomnia    Weakness of both lower extremities    Recurrent falls        Past Medical History:   Diagnosis Date    Bipolar affect, depressed     Cervical myelopathy     Colon polyp     Depression     Gastritis     GERD (gastroesophageal reflux disease)     H/O colonoscopy with polypectomy     Migraine headache     Pancreatic cyst     4 cm        Past Surgical History:   Procedure Laterality Date    COLONOSCOPY  2013    COLONOSCOPY N/A 10/31/2016    polyps, tics, IH, tubular adenoma w/low grade dysplasia x 2, hyperplastic polyp x3    COLONOSCOPY N/A 2019    Procedure: COLONOSCOPY  WITH COLD BIOPSIES AND COLD SNARE POLYPECTOMY;  Surgeon: Thad Brumfield MD;  Location: [facility] ENDOSCOPY;  Service: Gastroenterology    ENDOSCOPY  2013    ENDOSCOPY N/A 10/31/2016    erythematous mucosa in stomach    ENDOSCOPY N/A 2019    Procedure: ESOPHAGOGASTRODUODENOSCOPYEITH COLD BIOPSIES;  Surgeon: Thad Brumfield MD;  Location: [facility] ENDOSCOPY;  Service: Gastroenterology    TONSILLECTOMY      UPPER GASTROINTESTINAL ENDOSCOPY            Visit Dx:     ICD-10-CM ICD-9-CM   1. Weakness of both lower extremities  R29.898 729.89   2. Impairment of balance  R26.89 781.2        Patient History       Row Name 11/18/24 1500             History    Chief Complaint Balance Problems  -LB      Date Current Problem(s) Began 08/16/24  referral date  -LB      Brief Description of Current Complaint Ms. Thomson is a a 78 y/o female referred to OP PT with concerns regarding a decline in her balance and strength  -LB      Patient/Caregiver Goals Comment Improve balance  -LB         Fall Risk Assessment    Any falls in the past year: No  -LB      Number of falls reported in the last 12 months Pt reports falls over 2 years ago.  Injuries include scapular fractures, and wrist fracture  -LB         Daily Activities    Pt Participated in POC and Goals Yes  -LB                User Key  (r) = Recorded By, (t) = Taken By, (c) = Cosigned By      Initials Name Provider Type    LB Kristina Jade, PT Physical Therapist                         PT Neuro       Row Name 11/18/24 1500             Subjective    Subjective Comments I just feel over the last year, I am not as balanced  -LB         Precautions and Contraindications    Precautions/Limitations fall precautions  -LB         Subjective Pain    Able to rate subjective pain? yes  -LB      Pre-Treatment Pain Level 3  -LB      Post-Treatment Pain Level 3  -LB      Subjective Pain Comment Plantar fasciitis on the left  -LB         Home Living    Current Living Arrangements apartment  -LB         Cognition    Overall Cognitive Status WFL  -LB      Arousal/Alertness Appropriate responses to stimuli  -LB      Memory Appears intact  -LB      Orientation Level Oriented X4  -LB      Safety Judgment Good awareness of safety precautions  -LB      Deficits Fully aware of deficits  -LB         Posture/Observations    Posture/Observations Comments Patient ambulated up to unit no assistive device  -LB         General ROM    GENERAL ROM  COMMENTS No range of motion deficits  -LB         MMT (Manual Muscle Testing)    Rt Lower Ext Rt Hip Flexion;Rt Hip ABduction;Rt Knee WFL;Rt Ankle WFL  -LB      Lt Lower Ext Lt Hip Flexion;Lt Hip ABduction;Lt Knee WFL;Lt Ankle WFL  -LB         MMT Right Lower Ext    Rt Hip Flexion MMT, Gross Movement (4/5) good  -LB      Rt Hip ABduction MMT, Gross Movement (4/5) good  -LB         MMT Left Lower Ext    Lt Hip Flexion MMT, Gross Movement (4/5) good  -LB      Lt Hip ABduction MMT, Gross Movement (4/5) good  -LB         Bed Mobility    Bed Mobility rolling left;rolling right;supine-sit-supine  -LB      Rolling Left [place] (Bed Mobility) independent  -LB      Rolling Right [place] (Bed Mobility) independent  -LB      Supine-Sit-Supine [place] (Bed Mobility) independent  -LB         Transfers    Sit-Stand [place] (Transfers) independent  -LB      Stand-Sit [place] (Transfers) independent  -LB         Gait/Stairs (Locomotion)    [place] Level (Gait) independent  -LB      Distance in Feet (Gait) 100  50 x 4  -LB      Pattern (Gait) step-through  -LB      Deviations/Abnormal Patterns (Gait) base of support, narrow  -LB      Bilateral Gait Deviations decreased arm swing  -LB      Left Sided Gait Deviations --  Decreased weightbearing; decreased forward translation of tibia  -LB      Gait Assessment/Intervention Antalgic gait due to plantar fasciitis  -LB      [place] Level (Stairs) modified independence  -LB      Handrail Location (Stairs) both sides  -LB      Number of Steps (Stairs) 4  -LB      Ascending Technique (Stairs) step-over-step  -LB      Descending Technique (Stairs) step-over-step  -LB         Balance Skills Training    SLS 3 seconds on right  -LB      Balance Comments See FGA and May  -LB                User Key  (r) = Recorded By, (t) = Taken By, (c) = Cosigned By      Initials Name Provider Type    LB Kristina Jade, PT Physical Therapist                            Therapy  Education  Education Details: PT plan of care with emphasis on balance training and HEP  Given: Other (comment)  Program: New  How Provided: Verbal  Provided to: Patient  Level of Understanding: Verbalized     PT OP Goals       Row Name 11/18/24 1500          PT Short Term Goals    STG Date to Achieve 12/16/24  -LB     STG 1 Pt will be independent with initial HEP to improve strength and balance.  -LB     STG 2 Patient will transfer to stand with 1 attempt from various surfaces independently.  -LB     STG 3 Patient will ambulate 100 feet while scanning environment and supervision with no deviation greater than 1 foot  -LB     STG 4 Patient to score 47/56 on the May balance test indicate improved balance and decrease risk for falls.  -LB     STG 5 Patient to perform semitandem for 30 independently.  -LB        Long Term Goals    LTG Date to Achieve 12/30/24  -LB     LTG 1 Patient will be independent with advanced HEP to improve strength and balance.  -LB     LTG 2 Patient to score 50/56 on the May balance test indicate improved balance and decrease risk for falls.  -LB     LTG 3 Patient to perform 5 times sit to stand less or equal to 20 seconds  -LB     LTG 4 Patient to score  26/30 on the FGA for improved balance and decreased risk for falls.  -LB     LTG 5 Patient to perform tandem standing for 15 seconds with standby assist  -LB     LTG 6 Patient to perform single-leg stance 10  seconds on right LE.  -LB        Time Calculation    PT Goal Re-Cert Due Date 12/16/24  -LB               User Key  (r) = Recorded By, (t) = Taken By, (c) = Cosigned By      Initials Name Provider Type    LB Kristina Jade, PT Physical Therapist                     PT Assessment/Plan       Row Name 11/18/24 1522          PT Assessment    Functional Limitations Decreased safety during functional activities;Performance in leisure activities  -LB     Impairments Balance;Gait  -LB     Assessment Comments Ms. Thomson is a pleasant 78 y/o  female referred to PT for balance and strengthening. PMH of osteoporosis, GERD, Vit D deficiency and Bipolar affective disorder. PMH of osteoporosis, GERD, Vit D deficiency and Bipolar affective disorder.  Pt reports  explained falls in past years and feelings of off balance when working in her kitchen, making turns and greater difficulty coming up to stand from lower surfaces.  The patient presents with minimal weakness proximally in her hips but good strength and knees and ankles.  The patient does present with a balance impairment as supported by scores of 43/56 on PIERCE and 21/30 on FGA.  Balance difficulties were evident with decreased ability to perform SLS, tandem stance, trunk rotation in standing  and walking with horizontal and vertical head turns.  The patient is motivated to work in regards to her balance and agrees with weekly PT and peforming HEP at home.  Anticipate that the patient will benefit from PT at this time to improve balance and decrease risk for falls.  -LB     Rehab Potential Good  -LB     Patient/caregiver participated in establishment of treatment plan and goals Yes  -LB     Patient would benefit from skilled therapy intervention Yes  -LB        PT Plan    PT Frequency 1x/week  -LB     Predicted Duration of Therapy Intervention (PT) 6 visits; 6-8 weeks  -LB     Planned CPT's? PT EVAL LOW COMPLEXITY: 15670;PT THER PROC EA 15 MIN: 44962;PT THER ACT EA 15 MIN: 21022;PT NEUROMUSC RE-EDUCATION EA 15 MIN: 94851;PT GAIT TRAINING EA 15 MIN: 88196;PT SELF CARE/HOME MGMT/TRAIN EA 15: 91679  -LB     Physical Therapy Interventions (Optional Details) balance training;gross motor skills;home exercise program;neuromuscular re-education;patient/family education;strengthening;transfer training  -LB               User Key  (r) = Recorded By, (t) = Taken By, (c) = Cosigned By      Initials Name Provider Type    Kristina Boykin, PT Physical Therapist                        OP Exercises       Row Name  11/18/24 1500             Subjective    Subjective Comments I just feel over the last year, I am not as balanced  -LB         Subjective Pain    Able to rate subjective pain? yes  -LB      Pre-Treatment Pain Level 3  -LB      Post-Treatment Pain Level 3  -LB      Subjective Pain Comment Plantar fasciitis on the left  -LB                User Key  (r) = Recorded By, (t) = Taken By, (c) = Cosigned By      Initials Name Provider Type    Kristina Boykin, PT Physical Therapist                                Outcome Measure Options: FGA (Functional Gait Assessment), May Balance  May Balance Scale  Sitting to Standing: able to stand without using hands and stabilize independently  Standing Unsupported: able to stand safely for 2 minutes  Sitting with Back Unsupported but Feet Supported on Floor or on Stool: able to sit safely and securely for 2 minutes  Standing to Sitting: sits safely with minimal use of hands  Transfers: able to transfer safely with minor use of hands  Standing Unsupported with Eyes Closed: able to stand 10 seconds with supervision  Standing Unsupported with Feet Together: able to place feet together independently and stand 1 minute with supervision  Reaching Forward with Outstretched Arm While Standing: can reach forward confidently 25 cm (10 inches)   Object From the Floor From a Standing Position: able to  object safely and easily  Turning to Look Behind Over Left and Right Shoulders While Standing: turns sideways only but maintains balance  Turn 360 Degrees: able to turn 360 degrees safely one side only 4 seconds or less  Place Alternate Foot on Step or Stool While Standing Unsupported: able to complete > 2 steps needs minimal assist  Standing Unsupported with One Foot in Front: able to take small step independently and hold 30 seconds  Standing on One Leg: tries to lift leg unable to hold 3 seconds but remains standing independently  May Total Score: 43  Functional Gait Assessment  (FGA)  Gait Level Surface: Normal  Change in Gait Speed: Normal  Gait with Horizontal Head Turns: Mild Impairment  Gait with Vertical Head Turns: Mild Impairment  Gait and Pivot Turn: Normal  Step Over Obstacle: Mild Impairment  Gait with Narrow Base of Support: Severe Impairment  Gait with Eyes Closed: Mild Impairment  Ambulating Backwards: Mild Impairment  Steps: Mild Impairment  FGA Total Score: 21    Time Calculation:   Start Time: 1418  Stop Time: 1500  Time Calculation (min): 42 min  Untimed Charges  PT Eval/Re-eval Minutes: 42  Total Minutes  Untimed Charges Total Minutes: 42   Total Minutes: 42   Therapy Charges for Today       Code Description Service Date Service Provider Modifiers Qty    [id number] HC PT EVAL LOW COMPLEXITY 4 11/18/2024 Kristina Jade, PT GP 1            PT G-Codes  Outcome Measure Options: FGA (Functional Gait Assessment), May Balance  May Total Score: 43  FGA Total Score: 21         Kristina Jade, PT  11/18/2024

## 2024-11-25 ENCOUNTER — HOSPITAL ENCOUNTER (OUTPATIENT)
Dept: MRI IMAGING | Facility: HOSPITAL | Age: 79
Discharge: HOME | End: 2024-11-25
Admitting: INTERNAL MEDICINE
Payer: MEDICARE

## 2024-11-25 DIAGNOSIS — K86.2 PANCREAS CYST: ICD-10-CM

## 2024-11-25 PROCEDURE — A9577 INJ MULTIHANCE: HCPCS | Performed by: INTERNAL MEDICINE

## 2024-11-25 PROCEDURE — 74183 MRI ABD W/O CNTR FLWD CNTR: CPT

## 2024-11-25 PROCEDURE — 25510000002 GADOBENATE DIMEGLUMINE 529 MG/ML SOLUTION: Performed by: INTERNAL MEDICINE

## 2024-11-25 RX ADMIN — GADOBENATE DIMEGLUMINE 10 ML: 529 INJECTION, SOLUTION INTRAVENOUS at 15:23

## 2024-12-16 ENCOUNTER — TELEPHONE (OUTPATIENT)
Dept: GASTROENTEROLOGY | Facility: CLINIC | Age: 79
End: 2024-12-16
Payer: MEDICARE

## 2024-12-16 NOTE — TELEPHONE ENCOUNTER
Patient notified of results and recommendations and verbalized understanding  Results routed to the PCP via epic    Pt will call back to schedule with gilberto Ríos for hub to schedule.

## 2024-12-16 NOTE — TELEPHONE ENCOUNTER
----- Message from Thad Brumfield sent at 12/16/2024  7:52 AM EST -----  12/16/24       Tell her that her MRI of the abdomen shows that the pancreatic cyst is unchanged.  There are some cysts in the liver (which are of no clinical importance).  Have her follow-up with one of my partners in 6 months.  They can talk about whether to do a repeat MRI in 1 year or not (to reevaluate the pancreatic cyst)?       Please send a copy of this report to her PCP.  Jorge stein

## 2024-12-18 ENCOUNTER — HOSPITAL ENCOUNTER (OUTPATIENT)
Dept: PHYSICAL THERAPY | Facility: HOSPITAL | Age: 79
Discharge: HOME | End: 2024-12-18
Admitting: FAMILY MEDICINE
Payer: MEDICARE

## 2024-12-18 DIAGNOSIS — R29.898 WEAKNESS OF BOTH LOWER EXTREMITIES: Primary | ICD-10-CM

## 2024-12-18 DIAGNOSIS — R26.89 IMPAIRMENT OF BALANCE: ICD-10-CM

## 2024-12-18 PROCEDURE — 97110 THERAPEUTIC EXERCISES: CPT

## 2024-12-18 PROCEDURE — 97530 THERAPEUTIC ACTIVITIES: CPT

## 2024-12-18 PROCEDURE — 97112 NEUROMUSCULAR REEDUCATION: CPT

## 2024-12-18 NOTE — THERAPY PROGRESS REPORT/RE-CERT
Outpatient Physical Therapy Neuro Progress Note  [facility]     Patient Name: Lona Thomson  : 1945  MRN: [id number]  Today's Date: 2024      Visit Date: 2024    Visit Dx:    ICD-10-CM ICD-9-CM   1. Weakness of both lower extremities  R29.898 729.89   2. 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, unspecified    Pancreatic cyst    Polyp of colon    Constipation    Gastroesophageal reflux disease    Localized osteoporosis without current pathological fracture    Osteopenia of left hip    Postmenopause    Familial hypercholesteremia    Multiple closed fractures of ribs of right side    Closed fracture of right scapula    Fall    Vitamin D deficiency    Primary insomnia    Weakness of both lower extremities    Recurrent falls            PT Neuro       Row Name 24 1145             Subjective    Subjective Comments I feel [place]  -LB         Precautions and Contraindications    Precautions/Limitations fall precautions  -LB         Subjective Pain    Able to rate subjective pain? yes  -LB      Subjective Pain Comment no complaints of pain  -LB         Cognition    Overall Cognitive Status WFL  -LB         Posture/Observations    Posture/Observations Comments Patient ambulated up to unit no assistive device  -LB         Transfers    Sit-Stand [place] (Transfers) independent  -LB      Stand-Sit [place] (Transfers) independent  -LB         Gait/Stairs (Locomotion)    [place] Level (Gait) independent  -LB      Distance in Feet (Gait) 80  60  -LB      Pattern (Gait) step-through  -LB      Deviations/Abnormal Patterns (Gait) base of  support, narrow  -LB      Bilateral Gait Deviations decreased arm swing  -LB         Balance Skills Training    Standing-Level of Assistance Contact guard  -LB      Static Standing Balance Support No upper extremity supported  -LB      Standing-Balance Activities Tandem Stance;Semi-tandum;Feet together;Foam square  -LB      Standing Balance # of Minutes modified SLS with one foot placed on 6 inch step with CGA to Min for balance.  -LB      SLS 2-3 secs on each LE  -LB      Ankle Strategy Assessment (Balance) Attempted alternating step to with standing on foam square with Min A, maintaining balance was a struggle, transitioned to solid ground  -LB                User Key  (r) = Recorded By, (t) = Taken By, (c) = Cosigned By      Initials Name Provider Type    Kristina Boykin PT Physical Therapist                             PT Assessment/Plan       Row Name 12/18/24 1242          PT Assessment    Assessment Comments The patient had a fall soon after her initial PT evaluation.  The patient reports walking in [facility] and was able to walk for 2 miles and was walking home when she tripped over a tree root.  Pt states she was sore but did not break anything.  Goals are still ongoing as pt has not been to PT since.  During the session, the patient did struggle with modified single limb stance and with single limb stance on solid ground.  Pt did have a ankle reaction but needed assist to maintain balance.  While attempting to perform alternating toe steps, the patient did have some hip instability.  Provided HEP for balance and hip strengthening.  -LB               User Key  (r) = Recorded By, (t) = Taken By, (c) = Cosigned By      Initials Name Provider Type    Kristina Boykin PT Physical Therapist                        OP Exercises       Row Name 12/18/24 8132 12/18/24 1143          Subjective    Subjective Comments -- I feel forward in [facility]  -LB        Subjective Pain    Able to rate subjective pain?  -- yes  -LB     Subjective Pain Comment -- no complaints of pain  -LB        Total Minutes    64665 - PT Therapeutic Exercise Minutes 19  -LB --     07681 -  PT Neuromuscular Reeducation Minutes 18  -LB --     03822 - PT Therapeutic Activity Minutes 8  -LB --        Exercise 1    Exercise Name 1 -- clams  -LB     Reps 1 -- 10  -LB        Exercise 2    Exercise Name 2 -- sidelying hip abduction  -LB     Reps 2 -- 10  -LB               User Key  (r) = Recorded By, (t) = Taken By, (c) = Cosigned By      Initials Name Provider Type    Kristina Boykin, PT Physical Therapist                                 PT OP Goals       Row Name 12/18/24 1200          PT Short Term Goals    STG 1 Pt will be independent with initial HEP to improve strength and balance.  -LB     STG 1 Progress Met  -LB     STG 2 Patient will transfer to stand with 1 attempt from various surfaces independently.  -LB     STG 2 Progress Met  -LB     STG 3 Patient will ambulate 100 feet while scanning environment and supervision with no deviation greater than 1 foot  -LB     STG 3 Progress Ongoing  -LB     STG 4 Patient to score 47/56 on the May balance test indicate improved balance and decrease risk for falls.  -LB     STG 4 Progress Ongoing  -LB     STG 5 Patient to perform semitandem for 30 independently.  -LB     STG 5 Progress Progressing;Ongoing  -LB        Long Term Goals    LTG Date to Achieve 01/29/25  -LB     LTG 1 Patient will be independent with advanced HEP to improve strength and balance.  -LB     LTG 1 Progress Ongoing  -LB     LTG 2 Patient to score 50/56 on the May balance test indicate improved balance and decrease risk for falls.  -LB     LTG 2 Progress Ongoing  -LB     LTG 3 Patient to perform 5 times sit to stand less or equal to 20 seconds  -LB     LTG 3 Progress Ongoing  -LB     LTG 4 Patient to score  26/30 on the FGA for improved balance and decreased risk for falls.  -LB     LTG 4 Progress Ongoing  -LB     LTG 5 Patient to  perform tandem standing for 15 seconds with standby assist  -LB     LTG 5 Progress Ongoing  -LB     LTG 6 Patient to perform single-leg stance 10  seconds on right LE.  -LB     LTG 6 Progress Ongoing  -LB        Time Calculation    PT Goal Re-Cert Due Date 01/15/25  -LB               User Key  (r) = Recorded By, (t) = Taken By, (c) = Cosigned By      Initials Name Provider Type    LB Kristina Jade, PT Physical Therapist                    Therapy Education  Education Details: HEP for hip strengthening and semitandem and SLS  Given: HEP  Program: New  How Provided: Verbal, Demonstration, Written  Provided to: Patient  Level of Understanding: Verbalized, Teach back education performed, Demonstrated              Time Calculation:   Start Time: 1102  Stop Time: 1145  Time Calculation (min): 43 min  Timed Charges  76995 - PT Therapeutic Exercise Minutes: 19  99766 -  PT Neuromuscular Reeducation Minutes: 18  49138 - PT Therapeutic Activity Minutes: 8  Total Minutes  Timed Charges Total Minutes: 45   Total Minutes: 45   Therapy Charges for Today       Code Description Service Date Service Provider Modifiers Qty    [id number]  PT NEUROMUSC RE EDUCATION EA 15 MIN 12/18/2024 Kristina Jade, PT GP 1    [id number]  PT THER PROC EA 15 MIN 12/18/2024 Kristina Jade, PT GP 1    [id number]  PT THERAPEUTIC ACT EA 15 MIN 12/18/2024 Kristina Jade, PT GP 1                      Kristina Jade PT  12/18/2024

## 2025-01-02 ENCOUNTER — DOCUMENTATION (OUTPATIENT)
Dept: PHYSICAL THERAPY | Facility: HOSPITAL | Age: 80
End: 2025-01-02
Payer: MEDICARE

## 2025-01-02 NOTE — THERAPY DISCHARGE NOTE
Outpatient Physical Therapy Discharge Summary         Patient Name: Lona Thomson  : 1945  MRN: [id number]    Today's Date: 2025    Visit Dx:  No diagnosis found.     PT OP Goals       Row Name 25 0900          PT Short Term Goals    STG 3 Patient will ambulate 100 feet while scanning environment and supervision with no deviation greater than 1 foot  -LB     STG 3 Progress Not Met  -LB     STG 4 Patient to score 47/56 on the May balance test indicate improved balance and decrease risk for falls.  -LB     STG 4 Progress Not Met  -LB     STG 5 Patient to perform semitandem for 30 independently.  -LB     STG 5 Progress Not Met  -LB        Long Term Goals    LTG 1 Patient will be independent with advanced HEP to improve strength and balance.  -LB     LTG 1 Progress Not Met  -LB     LTG 2 Patient to score 50/56 on the May balance test indicate improved balance and decrease risk for falls.  -LB     LTG 2 Progress Not Met  -LB     LTG 3 Patient to perform 5 times sit to stand less or equal to 20 seconds  -LB     LTG 3 Progress Not Met  -LB     LTG 4 Patient to score  26/30 on the FGA for improved balance and decreased risk for falls.  -LB     LTG 4 Progress Not Met  -LB     LTG 5 Patient to perform tandem standing for 15 seconds with standby assist  -LB     LTG 5 Progress Not Met  -LB     LTG 6 Patient to perform single-leg stance 10  seconds on right LE.  -LB     LTG 6 Progress Not Met  -LB               User Key  (r) = Recorded By, (t) = Taken By, (c) = Cosigned By      Initials Name Provider Type    Kristina Boykin, PT Physical Therapist                    OP PT Discharge Summary  Date of Discharge: 25  Reason for Discharge: Patient/Caregiver request  Outcomes Achieved: Refer to plan of care for updates on goals achieved  Discharge Destination: Home without follow-up  Discharge Instructions/Additional Comments: Pt seen for 2 sessions.  Today the patient arrived initially at the wrong  clinic and then did want to stay and declined anymore sessions.      Time Calculation:                    Kristian Jade, PT  1/2/2025

## 2025-02-07 DIAGNOSIS — E78.01 FAMILIAL HYPERCHOLESTEREMIA: Chronic | ICD-10-CM

## 2025-02-07 DIAGNOSIS — M81.6 LOCALIZED OSTEOPOROSIS WITHOUT CURRENT PATHOLOGICAL FRACTURE: Chronic | ICD-10-CM

## 2025-02-07 DIAGNOSIS — E55.9 VITAMIN D DEFICIENCY: Primary | Chronic | ICD-10-CM

## 2025-02-13 DIAGNOSIS — M81.6 LOCALIZED OSTEOPOROSIS WITHOUT CURRENT PATHOLOGICAL FRACTURE: ICD-10-CM

## 2025-02-13 DIAGNOSIS — M85.89 OSTEOPENIA OF MULTIPLE SITES: ICD-10-CM

## 2025-02-13 RX ORDER — ALENDRONATE SODIUM 70 MG/1
70 TABLET ORAL
Qty: 12 TABLET | Refills: 4 | Status: SHIPPED | OUTPATIENT
Start: 2025-02-13

## 2025-02-13 NOTE — TELEPHONE ENCOUNTER
Rx Refill Note  Requested Prescriptions     Pending Prescriptions Disp Refills    alendronate (FOSAMAX) 70 MG tablet 12 tablet 4     Sig: Take 1 tablet by mouth Every 7 (Seven) Days.      Last office visit with prescribing clinician: 11/18/2024   Last telemedicine visit with prescribing clinician: Visit date not found   Next office visit with prescribing clinician: 2/19/2025                         Would you like a call back once the refill request has been completed: [] Yes [] No    If the office needs to give you a call back, can they leave a voicemail: [] Yes [] No    Fuad Valencia MA  02/13/25, 11:33 EST

## 2025-02-21 ENCOUNTER — TRANSCRIBE ORDERS (OUTPATIENT)
Dept: ADMINISTRATIVE | Facility: HOSPITAL | Age: 80
End: 2025-02-21
Payer: MEDICARE

## 2025-02-21 DIAGNOSIS — Z12.31 BREAST CANCER SCREENING BY MAMMOGRAM: Primary | ICD-10-CM

## 2025-02-24 ENCOUNTER — OFFICE VISIT (OUTPATIENT)
Dept: INTERNAL MEDICINE | Facility: CLINIC | Age: 80
End: 2025-02-24
Payer: MEDICARE

## 2025-02-24 VITALS
WEIGHT: 115.6 LBS | HEART RATE: 73 BPM | HEIGHT: 65 IN | DIASTOLIC BLOOD PRESSURE: 82 MMHG | OXYGEN SATURATION: 95 % | SYSTOLIC BLOOD PRESSURE: 140 MMHG | BODY MASS INDEX: 19.26 KG/M2 | RESPIRATION RATE: 20 BRPM

## 2025-02-24 DIAGNOSIS — E55.9 VITAMIN D DEFICIENCY: Chronic | ICD-10-CM

## 2025-02-24 DIAGNOSIS — M81.6 LOCALIZED OSTEOPOROSIS WITHOUT CURRENT PATHOLOGICAL FRACTURE: Chronic | ICD-10-CM

## 2025-02-24 DIAGNOSIS — Z00.00 MEDICARE ANNUAL WELLNESS VISIT, SUBSEQUENT: Primary | ICD-10-CM

## 2025-02-24 DIAGNOSIS — E78.01 FAMILIAL HYPERCHOLESTEREMIA: Chronic | ICD-10-CM

## 2025-02-24 PROCEDURE — 99213 OFFICE O/P EST LOW 20 MIN: CPT | Performed by: STUDENT IN AN ORGANIZED HEALTH CARE EDUCATION/TRAINING PROGRAM

## 2025-02-24 PROCEDURE — 1126F AMNT PAIN NOTED NONE PRSNT: CPT | Performed by: STUDENT IN AN ORGANIZED HEALTH CARE EDUCATION/TRAINING PROGRAM

## 2025-02-24 PROCEDURE — G0439 PPPS, SUBSEQ VISIT: HCPCS | Performed by: STUDENT IN AN ORGANIZED HEALTH CARE EDUCATION/TRAINING PROGRAM

## 2025-02-24 NOTE — PROGRESS NOTES
Subjective   The ABCs of the Annual Wellness Visit  Medicare Wellness Visit      Lona Thomson is a 79 y.o. patient who presents for a Medicare Wellness Visit.    The following portions of the patient's history were reviewed and   updated as appropriate: allergies, current medications, past family history, past medical history, past social history, past surgical history, and problem list.    Compared to one year ago, the patient's physical   health is the same.  Compared to one year ago, the patient's mental   health is the same.    Recent Hospitalizations:  She was not admitted to the hospital during the last year.     Current Medical Providers:  Patient Care Team:  Gavi Menjivar MD as PCP - General (Internal Medicine)  Thad Brumfield MD as Consulting Physician (Gastroenterology)  Adeola Kaufman MD as Consulting Physician (Gynecology)  Ashlee Hampton MD (Psychiatry)  Fuad Bernstein MD (Dermatopathology)    Outpatient Medications Prior to Visit   Medication Sig Dispense Refill    alendronate (FOSAMAX) 70 MG tablet Take 1 tablet by mouth Every 7 (Seven) Days. 12 tablet 4    atorvastatin (LIPITOR) 20 MG tablet Take 1 tablet by mouth Every Night. 90 tablet 4    calcium carbonate (OS-SEVERINO) 600 MG tablet Take 1 tablet by mouth Daily.      diclofenac (VOLTAREN) 75 MG EC tablet Take 1 tablet by mouth Every 12 (Twelve) Hours.      LORazepam (ATIVAN) 0.5 MG tablet Take 1 tablet by mouth 2 (Two) Times a Day As Needed.  2    metroNIDAZOLE (METROCREAM) 0.75 % cream APPLY TO SKIN TWICE DAILY FOR 6 WEEKS AS DIRECTED      omeprazole (priLOSEC) 40 MG capsule TAKE 1 CAPSULE BY MOUTH EVERY DAY 90 capsule 3    triamcinolone (KENALOG) 0.1 % ointment Apply  topically to the appropriate area as directed.      vitamin D (ERGOCALCIFEROL) 1.25 MG (26020 UT) capsule capsule Take 1 capsule by mouth 1 (One) Time Per Week. 15 capsule 3    escitalopram (LEXAPRO) 10 MG tablet Patient supposed to start after finishing Trintellix   "    lamoTRIgine (LaMICtal) 100 MG tablet Take 1.5 tablets by mouth 3 (Three) Times a Day.      escitalopram (LEXAPRO) 20 MG tablet TAKE 1 TABLET (20 MG TOTAL) BY MOUTH ONE TIME EACH DAY (Patient not taking: Reported on 2/24/2025) 30 tablet 0     No facility-administered medications prior to visit.     No opioid medication identified on active medication list. I have reviewed chart for other potential  high risk medication/s and harmful drug interactions in the elderly.      Aspirin is not on active medication list.  Aspirin use is not indicated based on review of current medical condition/s. Risk of harm outweighs potential benefits.  .    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  ACP discussion was held with the patient during this visit. Patient has an advance directive (not in EMR), copy requested.            Objective   Vitals:    02/24/25 1048   BP: 140/82   BP Location: Left arm   Patient Position: Sitting   Cuff Size: Pediatric   Pulse: 73   Resp: 20   SpO2: 95%   Weight: 52.4 kg (115 lb 9.6 oz)   Height: 165.1 cm (65\")   PainSc: 0-No pain       Estimated body mass index is 19.24 kg/m² as calculated from the following:    Height as of " "this encounter: 165.1 cm (65\").    Weight as of this encounter: 52.4 kg (115 lb 9.6 oz).    BMI is within normal parameters. No other follow-up for BMI required.           Does the patient have evidence of cognitive impairment? No                                                                                                Health  Risk Assessment    Smoking Status:  Social History     Tobacco Use   Smoking Status Former    Current packs/day: 0.00    Average packs/day: 0.3 packs/day for 7.9 years (2.0 ttl pk-yrs)    Types: Cigarettes    Start date: 2012    Quit date: 2019    Years since quittin.2   Smokeless Tobacco Never   Tobacco Comments    We discussed nicotine patch, gum, hypnosis.     Alcohol Consumption:  Social History     Substance and Sexual Activity   Alcohol Use Not Currently    Comment: Drink when out for dinner once a week       Fall Risk Screen  STEADI Fall Risk Assessment was completed, and patient is at HIGH risk for falls. Assessment completed on:2025    Depression Screening   Little interest or pleasure in doing things? Not at all   Feeling down, depressed, or hopeless? Not at all   PHQ-2 Total Score 0      Health Habits and Functional and Cognitive Screenin/24/2025    11:01 AM   Functional & Cognitive Status   Do you have difficulty preparing food and eating? No   Do you have difficulty bathing yourself, getting dressed or grooming yourself? No   Do you have difficulty using the toilet? No   Do you have difficulty moving around from place to place? No   Do you have trouble with steps or getting out of a bed or a chair? No   Current Diet Well Balanced Diet   Dental Exam Up to date   Eye Exam Up to date   Exercise (times per week) 7 times per week   Current Exercises Include Walking   Do you need help using the phone?  No   Are you deaf or do you have serious difficulty hearing?  No   Do you need help to go to places out of walking distance? No   Do you need help shopping? " No   Do you need help preparing meals?  No   Do you need help with housework?  No   Do you need help with laundry? No   Do you need help taking your medications? No   Do you need help managing money? No   Do you ever drive or ride in a car without wearing a seat belt? No   Have you felt unusual stress, anger or loneliness in the last month? No   Who do you live with? Alone   If you need help, do you have trouble finding someone available to you? No   Have you been bothered in the last four weeks by sexual problems? No   Do you have difficulty concentrating, remembering or making decisions? No           Age-appropriate Screening Schedule:  Refer to the list below for future screening recommendations based on patient's age, sex and/or medical conditions. Orders for these recommended tests are listed in the plan section. The patient has been provided with a written plan.    Health Maintenance List  Health Maintenance   Topic Date Due    RSV Vaccine - Adults (1 - 1-dose 75+ series) Never done    INFLUENZA VACCINE  07/01/2024    COVID-19 Vaccine (7 - 2024-25 season) 10/04/2024    LIPID PANEL  02/21/2025    DXA SCAN  11/03/2025    ANNUAL WELLNESS VISIT  02/24/2026    TDAP/TD VACCINES (2 - Td or Tdap) 01/15/2029    COLORECTAL CANCER SCREENING  12/09/2029    HEPATITIS C SCREENING  Completed    Pneumococcal Vaccine 50+  Completed    ZOSTER VACCINE  Completed    MAMMOGRAM  Discontinued                                                                                                                                                CMS Preventative Services Quick Reference  Risk Factors Identified During Encounter  Polypharmacy: Medication List reviewed and Medications are appropriate for patient    Subjective   She is also following up regarding hyperlipidemia, osteoporosis, and vitamin D deficiency.  Taking atorvastatin and alendronate as prescribed.  DEXA scan is up-to-date; repeat due in November.  Follows with psychiatry  regarding bipolar disorder and subsequent treatment for this.    Physical Exam  Vitals reviewed.   Constitutional:       General: She is not in acute distress.     Appearance: Normal appearance.   Cardiovascular:      Rate and Rhythm: Normal rate and regular rhythm.      Heart sounds: No murmur heard.  Pulmonary:      Effort: Pulmonary effort is normal. No respiratory distress.      Breath sounds: Normal breath sounds.   Skin:     General: Skin is warm and dry.   Neurological:      Mental Status: She is alert and oriented to person, place, and time.   Psychiatric:         Mood and Affect: Mood normal.         Thought Content: Thought content normal.         Judgment: Judgment normal.        The above risks/problems have been discussed with the patient.  Pertinent information has been shared with the patient in the After Visit Summary.  An After Visit Summary and PPPS were made available to the patient.    Follow Up:   Next Medicare Wellness visit to be scheduled in 1 year.          Medicare annual wellness visit, subsequent  Patient has appointment for fasting labs later this week.  Review labs and advise regarding the below chronic medical conditions.       Localized osteoporosis without current pathological fracture         Vitamin D deficiency         Familial hypercholesteremia                    Follow Up   Return in about 6 months (around 8/24/2025).  Patient was given instructions and counseling regarding her condition or for health maintenance advice. Please see specific information pulled into the AVS if appropriate.

## 2025-03-03 ENCOUNTER — TELEPHONE (OUTPATIENT)
Dept: INTERNAL MEDICINE | Facility: CLINIC | Age: 80
End: 2025-03-03
Payer: MEDICARE

## 2025-03-03 NOTE — TELEPHONE ENCOUNTER
Caller: Lona Thomson    Relationship: Self    Best call back number: [phone]     What is the medical concern/diagnosis: OSTEOPOROSIS     Any additional details: PATIENT STATES THAT THAT SHE HAD RECENT APPOINTMENT WITH DR VERGARA, AND AFTER VISIT NOTES ADVISED TO FOLLOW UP WITH A SPECIALIST. PATIENT REQUESTS REFERRAL TO START.

## 2025-03-04 DIAGNOSIS — M81.6 LOCALIZED OSTEOPOROSIS WITHOUT CURRENT PATHOLOGICAL FRACTURE: Primary | Chronic | ICD-10-CM

## 2025-03-04 NOTE — TELEPHONE ENCOUNTER
Patient does not have a recommendation/preferred person just whoever you recommend. Pt just wants to be referred to a Bone specialist.

## 2025-03-11 ENCOUNTER — HOSPITAL ENCOUNTER (OUTPATIENT)
Dept: MAMMOGRAPHY | Facility: HOSPITAL | Age: 80
Discharge: HOME | End: 2025-03-11
Admitting: STUDENT IN AN ORGANIZED HEALTH CARE EDUCATION/TRAINING PROGRAM
Payer: MEDICARE

## 2025-03-11 DIAGNOSIS — Z12.31 BREAST CANCER SCREENING BY MAMMOGRAM: ICD-10-CM

## 2025-03-11 PROCEDURE — 77063 BREAST TOMOSYNTHESIS BI: CPT

## 2025-03-11 PROCEDURE — 77067 SCR MAMMO BI INCL CAD: CPT

## 2025-04-07 DIAGNOSIS — E78.01 FAMILIAL HYPERCHOLESTEREMIA: Chronic | ICD-10-CM

## 2025-04-07 RX ORDER — ATORVASTATIN CALCIUM 20 MG/1
20 TABLET, FILM COATED ORAL NIGHTLY
Qty: 90 TABLET | Refills: 4 | Status: SHIPPED | OUTPATIENT
Start: 2025-04-07

## 2025-04-07 NOTE — TELEPHONE ENCOUNTER
Caller: Lona Thomson    Relationship: Self    Best call back number: [phone]    Requested Prescriptions:   Requested Prescriptions     Pending Prescriptions Disp Refills    atorvastatin (LIPITOR) 20 MG tablet 90 tablet 4     Sig: Take 1 tablet by mouth Every Night.        Pharmacy where request should be sent: [facility]Y #[id number] - [place], [place] - [address] AT [facility] - [phone] [facility] [phone] FX     Last office visit with prescribing clinician: 2/24/2025   Last telemedicine visit with prescribing clinician: Visit date not found   Next office visit with prescribing clinician: Visit date not found     Ben Forte Rep   04/07/25 11:07 EDT

## 2025-04-23 ENCOUNTER — OFFICE VISIT (OUTPATIENT)
Dept: ENDOCRINOLOGY | Age: 80
End: 2025-04-23
Payer: MEDICARE

## 2025-04-23 VITALS
OXYGEN SATURATION: 98 % | WEIGHT: 119.6 LBS | HEIGHT: 65 IN | DIASTOLIC BLOOD PRESSURE: 72 MMHG | HEART RATE: 72 BPM | SYSTOLIC BLOOD PRESSURE: 138 MMHG | BODY MASS INDEX: 19.93 KG/M2

## 2025-04-23 DIAGNOSIS — M85.89 OSTEOPENIA OF MULTIPLE SITES: ICD-10-CM

## 2025-04-23 DIAGNOSIS — M81.6 LOCALIZED OSTEOPOROSIS WITHOUT CURRENT PATHOLOGICAL FRACTURE: ICD-10-CM

## 2025-04-23 RX ORDER — ARIPIPRAZOLE 5 MG/1
5 TABLET ORAL DAILY
COMMUNITY

## 2025-04-23 RX ORDER — MELOXICAM 15 MG/1
1 TABLET ORAL DAILY
COMMUNITY
Start: 2025-04-07

## 2025-04-23 RX ORDER — ALENDRONATE SODIUM 70 MG/1
70 TABLET ORAL
Qty: 12 TABLET | Refills: 4 | Status: SHIPPED | OUTPATIENT
Start: 2025-04-23

## 2025-04-23 NOTE — PROGRESS NOTES
Referring provider: Gavi Menjivar MD     Chief complaint/Reason for consult: osteoporosis    HPI:   - 78 year old female here for osteoporosis  - Prior fragility fractures: none but traumatic fractures  - Prior/current treatments for osteoporosis: alendronate 70 mg weekly for 4 years  - Menopausal status: postmenopausal  - Family history of osteoporosis:  possibly in her mother  - Vitamin D and calcium intake: is on vitamin D and calcium  - Physical activity: walks daily  - Last DXA:  11/2023 showing a most severe T-score -3.2 of the right hip       The following portions of the patient's history were reviewed and updated as appropriate: allergies, current medications, past family history, past medical history, past social history, past surgical history, and problem list.      Objective     Vitals:    04/23/25 1419   BP: 138/72   Pulse: 72   SpO2: 98%        Physical Exam  Vitals reviewed.   Constitutional:       Appearance: Normal appearance.   HENT:      Head: Normocephalic and atraumatic.   Eyes:      General: No scleral icterus.  Pulmonary:      Effort: Pulmonary effort is normal. No respiratory distress.   Neurological:      Mental Status: She is alert.   Psychiatric:         Mood and Affect: Mood normal.         Behavior: Behavior normal.         Thought Content: Thought content normal.         Judgment: Judgment normal.         Assessment & Plan   Osteoporosis  - Will continue alendronate 70 mg weekly  - Recommend 1000 IU vitamin D3 daily, 1200 mg of dietary/supplementary calcium, weight bearing exercise as tolerated  - Will order repeat DXA scan     - Return to clinic in 1 year

## 2025-06-19 DIAGNOSIS — E55.9 VITAMIN D DEFICIENCY: ICD-10-CM

## 2025-06-19 RX ORDER — ERGOCALCIFEROL 1.25 MG/1
50000 CAPSULE, LIQUID FILLED ORAL WEEKLY
Qty: 15 CAPSULE | Refills: 3 | Status: SHIPPED | OUTPATIENT
Start: 2025-06-19

## 2025-06-19 NOTE — TELEPHONE ENCOUNTER
Caller: Lona Thomson    Relationship: Self    Best call back number: [phone]      Requested Prescriptions:   Requested Prescriptions     Pending Prescriptions Disp Refills    vitamin D (ERGOCALCIFEROL) 1.25 MG (12513 UT) capsule capsule 15 capsule 3     Sig: Take 1 capsule by mouth 1 (One) Time Per Week.      Pharmacy where request should be sent: [id number] - [place], [place] - [address] AT [address] [facility] - [phone] [facility] [phone] FX     Last office visit with prescribing clinician: 2/24/2025   Last telemedicine visit with prescribing clinician: Visit date not found   Next office visit with prescribing clinician: Visit date not found     Does the patient have less than a 3 day supply:  [x] Yes  [] No    Ben Block Rep   06/19/25 08:09 EDT

## (undated) DEVICE — SNAR POLYP SENSATION STDOVL 27 240 BX40

## (undated) DEVICE — CANN NASL CO2 TRULINK W/O2 A/

## (undated) DEVICE — THE TORRENT IRRIGATION SCOPE CONNECTOR IS USED WITH THE TORRENT IRRIGATION TUBING TO PROVIDE IRRIGATION FLUIDS SUCH AS STERILE WATER DURING GASTROINTESTINAL ENDOSCOPIC PROCEDURES WHEN USED IN CONJUNCTION WITH AN IRRIGATION PUMP (OR ELECTROSURGICAL UNIT).: Brand: TORRENT

## (undated) DEVICE — TUBING, SUCTION, 1/4" X 10', STRAIGHT: Brand: MEDLINE

## (undated) DEVICE — THE SINGLE USE ETRAP – POLYP TRAP IS USED FOR SUCTION RETRIEVAL OF ENDOSCOPICALLY REMOVED POLYPS.: Brand: ETRAP

## (undated) DEVICE — KT VLV BIOGUARD SXN BIOP AIR/H20 CONN 4PC DISP

## (undated) DEVICE — SENSR O2 OXIMAX FNGR A/ 18IN NONSTR

## (undated) DEVICE — BITEBLOCK OMNI BLOC

## (undated) DEVICE — FRCP BX RADJAW4 NDL 2.8 240CM LG OG BX40